# Patient Record
Sex: FEMALE | Race: WHITE
[De-identification: names, ages, dates, MRNs, and addresses within clinical notes are randomized per-mention and may not be internally consistent; named-entity substitution may affect disease eponyms.]

---

## 2023-10-17 ENCOUNTER — HOSPITAL ENCOUNTER (EMERGENCY)
Dept: HOSPITAL 97 - ER | Age: 33
Discharge: HOME | End: 2023-10-17
Payer: COMMERCIAL

## 2023-10-17 DIAGNOSIS — R10.12: Primary | ICD-10-CM

## 2023-10-17 DIAGNOSIS — Z88.2: ICD-10-CM

## 2023-10-17 LAB
ALBUMIN SERPL BCP-MCNC: 3.2 G/DL (ref 3.4–5)
ALP SERPL-CCNC: 50 U/L (ref 45–117)
ALT SERPL W P-5'-P-CCNC: 14 U/L (ref 13–56)
AST SERPL W P-5'-P-CCNC: 6 U/L (ref 15–37)
BUN BLD-MCNC: 12 MG/DL (ref 7–18)
GLUCOSE SERPLBLD-MCNC: 90 MG/DL (ref 74–106)
HCT VFR BLD CALC: 32.1 % (ref 36–45)
LIPASE SERPL-CCNC: 67 U/L (ref 13–75)
LYMPHOCYTES # SPEC AUTO: 3.7 K/UL (ref 0.7–4.9)
MCV RBC: 79.8 FL (ref 80–100)
PMV BLD: 8.8 FL (ref 7.6–11.3)
POTASSIUM SERPL-SCNC: 3.5 MEQ/L (ref 3.5–5.1)
RBC # BLD: 4.02 M/UL (ref 3.86–4.86)
WBC # BLD AUTO: 8.4 THOU/UL (ref 4.3–10.9)

## 2023-10-17 PROCEDURE — 99284 EMERGENCY DEPT VISIT MOD MDM: CPT

## 2023-10-17 PROCEDURE — 80053 COMPREHEN METABOLIC PANEL: CPT

## 2023-10-17 PROCEDURE — 96361 HYDRATE IV INFUSION ADD-ON: CPT

## 2023-10-17 PROCEDURE — 81025 URINE PREGNANCY TEST: CPT

## 2023-10-17 PROCEDURE — 96374 THER/PROPH/DIAG INJ IV PUSH: CPT

## 2023-10-17 PROCEDURE — 81001 URINALYSIS AUTO W/SCOPE: CPT

## 2023-10-17 PROCEDURE — 74177 CT ABD & PELVIS W/CONTRAST: CPT

## 2023-10-17 PROCEDURE — 83690 ASSAY OF LIPASE: CPT

## 2023-10-17 PROCEDURE — 85025 COMPLETE CBC W/AUTO DIFF WBC: CPT

## 2023-10-17 PROCEDURE — 36415 COLL VENOUS BLD VENIPUNCTURE: CPT

## 2023-10-17 PROCEDURE — 96375 TX/PRO/DX INJ NEW DRUG ADDON: CPT

## 2023-10-17 NOTE — XMS REPORT
Continuity of Care Document

                           Created on:2023



Patient:SHADE AGUILAR

Sex:Female

:1990

External Reference #:[id number]





Demographics







                          Address                    [place] [id number]

 

                          Home Phone                [phone]

 

                          Work Phone                [phone]

 

                          Mobile Phone              [phone]

 

                          Email Address             [email or web address]

[email or web address]

 

                          Preferred Language        en

 

                          Marital Status            Unknown

 

                          Christianity Affiliation     Unknown

 

                          Race                      Unknown

 

                          Additional Race(s)        White



                                                    Unavailable

 

                          Ethnic Group              Not  or 









Author







                          Organization              [facility]

t

 

                          Address                   [address] [place] [address] [address]



                                                    [place] [address]

 

                          Phone                     [phone]









Support







                Name            Relationship    Address         Phone

 

                ZEENAT AGUILAR     Spouse          Unavailable     [phone]

 

                Sathya Camilo  Father          not given by pt [phone]



                                                [place], [place]     

 

                Grace Sorto  Mother           [address] [phone]

[phone]



                                                [place] [id number] 

 

                SATHYA CAMILO  E               Unavailable     [phone]

 

                ZEENAT AGUILAR     X               NOT DISCLOSED   [phone]



                                                [place] [id number] 

 

                SHADE AGUILAR                [address] Unavailable



                                                [place] [address] 

 

                NOT, OBTAINED   OT              UNKNOWN         [phone]



                                                [place] [address] 

 

                ZEENAT AGUILAR     SP               [address] [phone]

[phone]



                                                [place] [id number] 









Care Team Providers







                    Name                Role                Phone

 

                    No, Pcp [facility]     Primary Care Physician Unavailable

 

                    CHIDI SIMON Attending Clinician Unavailable

 

                    Chidi Simon DNP Attending Clinician [phone]

 

                    Estela Spangler MD   Attending Clinician [phone]

 

                    Janay Valdes    Attending Clinician [phone]

 

                    ESTELA SPANGLER      Attending Clinician Unavailable

 

                    Res[facility]/Jonas, [facility] Attending Clinician Unavailable

 

                    Doctor Unassigned, No Name Attending Clinician Unavailable

 

                    LAZARO KENNEDY     Attending Clinician Unavailable

 

                    Lazaro Kennedy MD  Attending Clinician [phone]

 

                    ANDERW LYNCH     Attending Clinician Unavailable

 

                    Andrew Lynch MD  Attending Clinician [phone]

 

                    CYDNEY HERMOSILLO     Attending Clinician Unavailable

 

                    Visit, [facility] Nurse Attending Clinician Unavailable

 

                    Dianne Sayda ANGELES Attending Clinician [phone]

 

                    DEANA GIMENEZ    Attending Clinician Unavailable

 

                    DEANA GIMENEZ    Attending Clinician Unavailable

 

                    Deana Gimenez MD Attending Clinician [phone]

 

                    Liz Valenzuela MD  Attending Clinician [phone]

 

                    Katya Jackson RN      Attending Clinician Unavailable

 

                    SAYDA DEAN    Attending Clinician Unavailable

 

                    Risk, Dic-Rmchp High Attending Clinician Unavailable

 

                    Brandi Renteria MD Attending Clinician [phone]

 

                    Risk, [facility] Physician/High Attending Clinician UnavailCANDIS Suarez  Attending Clinician Unavailable

 

                    CANDIS MALIN  Attending Clinician Unavailable

 

                    LIZ VALENZUELA     Attending Clinician Unavailable

 

                    SANGEETA ROBERT   Attending Clinician Unavailable

 

                    [facility], Sangeeta Ayoub Attending Clinician [phone]

 

                    MATTHIAS ANGEL   Attending Clinician Unavailable

 

                    Matthias Angel MD Attending Clinician [phone]

 

                    4, [facility] Attending Clinician Unavailable

 

                    Vinicio Sims DO  Attending Clinician [phone]

 

                    JUANA CALDERON Attending Clinician Unavailable

 

                    [facility], Autumn DOMÍNGUEZ Attending Clinician [phone]

[phone]

 

                    1, [facility] Attending Clinician Unavailable

 

                    LYNNE SERRA   Attending Clinician Unavailable

 

                    Lynne Serra MD Attending Clinician [phone]

 

                    WAYNE SOSA     Attending Clinician Unavailable

 

                    Palomo ISAAC, Татьяна SHARP Attending Clinician [phone]

 

                    DAISY WHALEY     Attending Clinician Unavailable

 

                    2, [facility] Attending Clinician Unavailable

 

                    Daisy Whaley MD  Attending Clinician [phone]

 

                    OMARI CARVAJAL     Attending Clinician Unavailable

 

                    Omari Nicholas Attending Clinician [phone]

 

                    AUTUMN BOND Attending Clinician Unavailable

 

                    STACEY TENORIO    Attending Clinician Unavailable

 

                    Stacey Garvin Attending Clinician [phone]

 

                    KEVIN CHAKRABORTY      Attending Clinician Unavailable

 

                    Kevin Chakraborty MD   Attending Clinician [phone]

 

                    KAIT THOMPSON Attending Clinician Unavailable

 

                    UNKNOWN, ATTENDING  Attending Clinician Unavailable

 

                    Corry Kelly Attending Clinician [phone]

 

                    CORRY MULLEN    Attending Clinician Unavailable

 

                    RAISSA BUTLER      Attending Clinician Unavailable

 

                    ASA LOO     Attending Clinician Unavailable

 

                    MATTHIAS ANGEL   Admitting Clinician Unavailable

 

                    DEANA GIMENEZ    Admitting Clinician Unavailable

 

                    Deana Gimenez MD Admitting Clinician [phone]

 

                    CANDIS MALIN  Admitting Clinician Unavailable

 

                    LIZ VALENZUELA     Admitting Clinician Unavailable

 

                    Liz Valenzuela MD  Admitting Clinician [phone]

 

                    Matthias Angel MD Admitting Clinician [phone]

 

                    OMARI CARVAJAL     Admitting Clinician Unavailable

 

                    STACEY TENORIO    Admitting Clinician Unavailable

 

                    KEVIN CHAKRABORTY      Admitting Clinician Unavailable

 

                    KAIT THOMPSON Admitting Clinician Unavailable









Payers







           Payer Name Policy Type Policy Number Effective Date Expiration Date S

loretta

 

           [place]            [id number]  2018            



           [employer]C                       00:00:00              



           [facility]                                                    

 

           MEDICARE PART A \T\            [id number] 2015            



           B                                00:00:00              

 

           [facility]            [id number]  2022-10-01            



           STAR PLUS                        00:00:00              

 

           MEDICA[place]            [id number]  2018            



                                            00:00:00              

 

           MEDICARE A B            [id number] 2015            



                                            00:00:00              

 

           [place]ICA[place]            [id number]  2021            



                                            00:00:00              

 

           GENERIC MEDICAID            [id number]                        



           O                                                    







Problems







       Condition Condition Condition Status Onset  Resolution Last   Treating Co

mments 

Source



       Name   Details Category        Date   Date   Treatment Clinician        



                                                 Date                 

 

       S/P    S/P    Disease Active                              [facility]



                       3-12                               ity of



       section section               00:00:                             [place]



                                   00                                 Medical



                                                                      Branch

 

       37 weeks 37 weeks Disease Active                              Unive

rs



       gestation gestation               3-09                               ity 

of



       of     of                   00:00:                             [place]



       pregnancy pregnancy               00                                 Medi

neil



                                                                      Branch

 

       Fetal  Fetal  Disease Active                              Univers



       heart rate heart rate               3-09                               it

y of



       decelerati decelerati               00:00:                             [facility]

[place]



       ons    ons                  00                                 Medical



       affecting affecting                                                  Bran

ch



       management management                                                  



       of mother of mother                                                  

 

       33 weeks 33 weeks Disease Active                              Unive

rs



       gestation gestation               2-13                               ity 

of



       of     of                   00:00:                             [place]



       pregnancy pregnancy               00                                 [facility]

 

       29 weeks 29 weeks Disease Active                              Unive

rs



       gestation gestation               1-16                               ity 

of



       of     of                   00:00:                             [place]



       pregnancy pregnancy               00                                 [facility]

 

       Anxiety Anxiety Disease Active 2022                             Univers



       and    and                  1-09                               ity of



       depression depression               00:00:                             Te

[place]



                                                                    [facility]

 

       H/O    H/O    Disease Active 2022                             [facility]



       gastric gastric               1-09                               ity of



       sleeve sleeve               00:00:                             [place]



                                                                    [facility]

 

       Ketonuria Ketonuria Disease Active                              Uni

vers



                                   9-16                               ity of



                                   00:00:                             [place]



                                                                    [facility]

 

       ASCUS with ASCUS with Disease Active                       Overview

: Univers



       positive positive               8-                        Formattin ity

 of



       high risk high risk               00:00:                      g of this T

exas



       HPV    HPV                  00                          note   Medical



       cervical cervical                                           might be Bran

ch



                                                               different 



                                                               from the 



                                                               original. 



                                                               Per [facility] 



                                                               guideline 



                                                               s pt will 



                                                               need   



                                                               colposcop 



                                                               y      

 

       GBS (group GBS (group Disease Active                              U

nivers



       B      B                    8-05                               ity of



       Streptococ Streptococ               00:00:                             Te

[place]



       cus    cus                  00                                 Medical



       carrier), carrier),                                                  Bran

ch



       +RV    +RV                                                     



       culture, culture,                                                  



       currently currently                                                  



       pregnant pregnant                                                  

 

       GBS (group GBS (group Disease Active                              U

nivers



       B      B                    8-05                               ity of



       streptococ streptococ               00:00:                             Te

[place]



       cus) UTI cus) UTI               00                                 Medica

l



       complicati complicati                                                  Br

anch



       ng     ng                                                      



       pregnancy pregnancy                                                  

 

       Group B Group B Disease Active                              Univers



       Streptococ Streptococ               8-05                               it

y of



       cus    cus                  00:00:                             [place]



       carrier carrier               00                                 [facility]



       affecting affecting                                                  



       pregnancy pregnancy                                                  

 

       Obesity Obesity Disease Active 2021                             [facility]



                                   1-02                               [facility]



                                   00[facility]                             [facility]



                                   00                                 [facility]

 

       History of History of Disease Active 2016                             U

nivers



       herpes herpes               2-30                               ity of



       genitalis genitalis               00:00:                             [place]

[place]



                                                                    Medical



                                                                      Branch

 

       Cyst of Cyst of Disease Active 2016                             [facility]



       left ovary left ovary               2-30                               it

y of



                                   00:00:                             [place]



                                   00                                 [facility]

 

       High-risk High-risk Disease Active 2014                             Uni

vers



       pregnancy, pregnancy,               2-11                               it

y of



       third  third                00:00:                             [place]



       trimester trimester               00                                 [facility]

 

       Bipolar Bipolar Disease Active 2014-1                             [facility]



       disorder, disorder,               2-10                               ity 

of



       unspecifie unspecifie               00:00:                             Te

[place]



       d      d                    00                                 Medical



                                                                      Branch

 

       Generalize Generalize Disease Active 2014                      Overview

: Univers



       d anxiety d anxiety               2-05                        Formattin i

ty of



       disorder disorder               00:00:                      g of this Carlos

as



                                   00                          note   Medical



                                                               might be Branch



                                                               different 



                                                               from the 



                                                               original. 



                                                               Previousl 



                                                               y on   



                                                               Sertralin 



                                                               e,     



                                                               Trileptal 



                                                               ,      



                                                               Amantadin 



                                                               e,     



                                                               Abilify, 



                                                               self   



                                                               d/c'd  



                                                               when she 



                                                               found out 



                                                               she was 



                                                               pregnant. 



                                                               Psychiatr 



                                                               ist Dr. Bolden 



                                                               ([facility]) has 



                                                               f/u    



                                                               scheduled 

 

       Attention Attention Disease Active                       Overview: 

Univers



       deficit deficit               4-23                        Formattin ity o

f



       hyperactiv hyperactiv               00:00:                      g of this

 [place]



       ity    ity                  00                          note   Medical



       disorder disorder                                           might be Bran

ch



       (ADHD) (ADHD)                                           different 



                                                               from the 



                                                               original. 



                                                               ICD10  



                                                               Diagnosis 



                                                               Term   



                                                               Replacer 



                                                               Utility 

 

       Obsessive- Obsessive- Disease Active                              U

nivers



       compulsive compulsive               -                               it

y of



       personalit personalit               00:00:                             Te

[place]



       y disorder y disorder               00                                 Me

dical



                                                                      Branch







Allergies, Adverse Reactions, Alerts







       Allergy Allergy Status Severity Reaction(s) Onset  Inactive Treating Comm

ents 

Source



       Name   Type                        Date   Date   Clinician        

 

       SULFA  Allergy Active Med    Itching 2021                      [facility]



       (SULFONA                             0-27                        [facility]



       [facility]                               00:00:                      Medical



       ANTIBIOT                             00                          Center



       ICS)                                                           

 

       SULFASAL Allergy Active Low    Rash   2008                      [facility]



       AZINE                              0-28                        [facility]



                                          00:00:                      [facility]



                                          00                          Center

 

       SULFASAL DRUG   Active        Rash   2008                      [facility]



       AZINE  INGREDI                      0-28                        ity of



                                          00:00:                      [place]



                                          00                          Medical



                                                                      Branch

 

       Sulfasal Propensi Active        Rash   2008                      Univer

s



       azine  ty to                       0-28                        ity of



              adverse                      00:00:                      [place]



              reaction                      00                          Medical



              s                                                       Branch

 

       Sulfasal Drug   Active        Rash   2008                      [facility]



       azine  Allergy                      0-28                        [facility]



                                          00:00:                      [facility]



                                          00                          Center

 

       SULFA  Allergy Active High   Itching                       [facility]



       (SULFONA                             4-22                        [facility]



       [facility]                               00:00:                      Medical



       ANTIBIOT                             00                          Center



       ICS)                                                           

 

       Sulfa  Propensi Active        Hives                        [facility]



       (Sulfona ty to                       4-22                        ity of



       mide   adverse                      00:00:                      [place]



       Antibiot reaction                      00                          Medica

l



       ics)   s                                                       Branch

 

       SULFA  Drug   Active        Hives                        [facility]



       (SULFONA Class                       4-22                        ity of



       MIDE                               00:00:                      [place]



       ANTIBIOT                             00                          Medical



       ICS)                                                           [place]

 

       Sulfa  Drug   Active        Itching,                Info   [facility]



       (Sulfona Allergy               Hives  4-22                 taken  [facility]



       mide                               00:00:               from [facility]



       Antibiot                             00                          Center



       ics)                                                           







Social History







           Social Habit Start Date Stop Date  Quantity   Comments   Source

 

           ASSERTION  2022            Pregnancy             [facility]



                      00:00:00                                    [facility]

 

           Gender identity                                             [facility]

[facility] of



                                                                  [facility]

 

           Sexual orientation                                             Univer

sity of



                                                                  [facility]

 

           History of tobacco                       Passive smoker            Un

iversity of



           use                                                    [facility]

 

           History SDOH                                             [facility]



           Alcohol Std Drinks                                             [facility]

[facility]

 

           History SDOH                                             [facility]



           Alcohol Binge                                             [facility]

ter

 

           Exposure to 2023-04-10 2023 Not sure              [facility] 



           SARS-CoV-2 (event) 00:00:00   14:10:00                         [facility]

 

           History of Social 2023                       Univers

ity of



           function   00:00:00   00:00:00                         [facility]

 

           Tobacco use and 2022 Smokeless             Universit

y of



           exposure   00:00:00   00:00:00   tobacco non-user            [facility]

 

           Alcohol intake 2021 Lifetime              [facility]

es



                      00:00:00   00:00:00   non-drinker            [facility]

[facility]



                                            (finding)             

 

           History SDOH 2021-10-29 2021-10-29 1                     [facility]



           Alcohol Frequency 00:00:00   00:00:00                         [facility]

 

           Sex Assigned At 1990                       [facility]

kes



           Birth      00:00:00   00:00:00                         [facility]









                Smoking Status  Start Date      Stop Date       Source

 

                Never smoked tobacco                                 [facility]







Medications







       Ordered Filled Start  Stop   Current Ordering Indication Dosage Frequency

 Signature

                    Comments            Components          Source



     Medication Medication Date Date Medication? Clinician                (SIG) 

          



     Name Name                                                   

 

     traMADoL       No             50mg      50 mg,           Univer

s



     (ULTRAM)                                Oral,           ity of



     tablet 50      06:30: 05:47                          ONCE, 1           [place]

[place]



     mg        00   :00                           dose, On           [facility]



                                                  23 at           



                                                  0130,           



                                                  Routine           

 

     cefdinir       No             300mg      300 mg,           Univ

ers



     (OMNICEF)                                Oral,           ity of



     capsule 300      05:45: 05:47                          ONCE, 1           [facility]

[place]



     mg        00   :00                           dose, On           Medical



                                                  [place]            [facility]



                                                  23 at           



                                                  0045,           



                                                  ASAP<br>Re           



                                                  ason for           



                                                  Anti-Infec           



                                                  tive:           



                                                  Documented           



                                                  Infection<           



                                                  br>Documen           



                                                  nick            



                                                  Infection           



                                                  Site:           



                                                  Urine<br>D           



                                                  uration of           



                                                  Therapy: 7           



                                                  days           

 

     cefdinir      -0 3- No        [id number] 300mg      Take 1           U

nivers



     300 mg                                capsule by           ity of



     capsule      00:00: 04:59                          mouth           [place]



               00   :00                           every 12           Medical



                                                  (twelve)           Branch



                                                  hours for           



                                                  5 days.           

 

     ARIPiprazol      -0      Yes            15mg      Take 1           Univ

ers



     e 15 mg      4-20                               tablet by           ity of



     tablet      14:19:                               mouth.           [address]

 

     ARIPiprazol      -0      Yes            15mg      Take 1           Univ

ers



     e 15 mg      4-20                               tablet by           ity of



     tablet      14:19:                               mouth.           [address]

 

     ARIPiprazol      -0      Yes            15mg      Take 1           Univ

ers



     e 15 mg      4-20                               tablet by           ity of



     tablet      14:19:                               mouth.           [address]

 

     ARIPiprazol      -0      Yes            15mg      Take 1           Univ

ers



     e 15 mg      4-20                               tablet by           ity of



     tablet      14:19:                               mouth.           [address]

 

     ARIPiprazol      -0      Yes            15mg      Take 1           Univ

ers



     e 15 mg      4-20                               tablet by           ity of



     tablet      14:19:                               mouth.           [address]

 

     ARIPiprazol      -0      Yes            15mg      Take 1           Univ

ers



     e 15 mg      4-20                               tablet by           ity of



     tablet      14:19:                               mouth.           [address]

 

     ARIPiprazol      -0      Yes            15mg      Take 1           Univ

ers



     e 15 mg      4-20                               tablet by           ity of



     tablet      14:19:                               mouth.           [address]

 

     ARIPiprazol      -0      Yes            15mg      Take 1           Univ

ers



     e 15 mg      4-20                               tablet by           ity of



     tablet      14:19:                               mouth.           [address]

 

     ARIPiprazol      -0      Yes            15mg      Take 1           Univ

ers



     e 15 mg      4-20                               tablet by           ity of



     tablet      14:19:                               mouth.           [address]

 

     ARIPiprazol      -0      Yes            15mg      Take 1           Univ

ers



     e 15 mg      4-20                               tablet by           ity of



     tablet      14:19:                               mouth.           [place]



               15                                                Medical



                                                                 Branch

 

     ARIPiprazol      -0      Yes            15mg      Take 1           Univ

ers



     e 15 mg      4-20                               tablet by           ity of



     tablet      14:19:                               mouth.           [place]



               15                                                Medical



                                                                 Branch

 

     ARIPiprazol      -0      Yes            15mg      Take 1           Univ

ers



     e 15 mg      4-20                               tablet by           ity of



     tablet      14:19:                               mouth.           [place]



               15                                                Medical



                                                                 Branch

 

     ARIPiprazol      -0      Yes            15mg      Take 1           Univ

ers



     e 15 mg      4-20                               tablet by           ity of



     tablet      14:19:                               mouth.           [place]



               15                                                Medical



                                                                 Branch

 

     ARIPiprazol      -0      Yes            15mg      Take 1           Univ

ers



     e 15 mg      4-20                               tablet by           ity of



     tablet      14:19:                               mouth.           [place]



               15                                                Medical



                                                                 Branch

 

     ARIPiprazol      -0      Yes            15mg      Take 1           Univ

ers



     e 15 mg      4-20                               tablet by           ity of



     tablet      14:19:                               mouth.           [place]



               15                                                Medical



                                                                 Branch

 

     ARIPiprazol      -0      Yes            15mg      Take 1           Univ

ers



     e 15 mg      4-20                               tablet by           ity of



     tablet      14:19:                               mouth.           [place]



               15                                                Medical



                                                                 Branch

 

     ARIPiprazol      -0      Yes            15mg      Take 1           Univ

ers



     e 15 mg      4-20                               tablet by           ity of



     tablet      14:19:                               mouth.           [place]



               15                                                Medical



                                                                 Branch

 

     ARIPiprazol      -0      Yes            15mg      Take 1           Univ

ers



     e 15 mg      4-20                               tablet by           ity of



     tablet      14:19:                               mouth.           [place]



               15                                                Medical



                                                                 Branch

 

     ARIPiprazol      -0      Yes            15mg      Take 1           Univ

ers



     e 15 mg      4-20                               tablet by           ity of



     tablet      14:19:                               mouth.           [place]



               15                                                Medical



                                                                 Branch

 

     ARIPiprazol      -0      Yes            15mg      Take 1           Univ

ers



     e 15 mg      4-20                               tablet by           ity of



     tablet      14:19:                               mouth.           [place]



               15                                                Medical



                                                                 Branch

 

     ARIPiprazol      -0      Yes            15mg      Take 1           Univ

ers



     e 15 mg      4-20                               tablet by           ity of



     tablet      14:19:                               mouth.           [place]



               15                                                Medical



                                                                 Branch

 

     ARIPiprazol      -0      Yes            15mg      Take 1           Univ

ers



     e 15 mg      4-20                               tablet by           ity of



     tablet      14:19:                               mouth.           [place]



               15                                                Medical



                                                                 Branch

 

     ARIPiprazol      -0      Yes            15mg      Take 1           Univ

ers



     e 15 mg      4-20                               tablet by           ity of



     tablet      14:19:                               mouth.           [place]



               15                                                Medical



                                                                 Branch

 

     ARIPiprazol      3-0      Yes            15mg      Take 1           Univ

ers



     e 15 mg      4-20                               tablet by           ity of



     tablet      14:19:                               mouth.           [place]



               15                                                Medical



                                                                 Branch

 

     ARIPiprazol      3-0      Yes            15mg      Take 1           Univ

ers



     e 15 mg      4-20                               tablet by           ity of



     tablet      14:19:                               mouth.           [place]



               15                                                Medical



                                                                 Branch

 

     busPIRone      3-0      Yes            10mg      Take 1           Univer

s



     10 mg      3-16                               tablet by           ity of



     tablet      13:28:                               mouth.           [place]



               08                                                Medical



                                                                 Branch

 

     busPIRone      2023-0      Yes            10mg      Take 1           Univer

s



     10 mg      3-16                               tablet by           ity of



     tablet      13:28:                               mouth.           [place]



               08                                                Medical



                                                                 Branch

 

     busPIRone      2023-0      Yes            10mg      Take 1           Univer

s



     10 mg      3-16                               tablet by           ity of



     tablet      13:28:                               mouth.           [place]



               08                                                Medical



                                                                 Branch

 

     busPIRone      2023-0      Yes            10mg      Take 1           Univer

s



     10 mg      3-16                               tablet by           ity of



     tablet      13:28:                               mouth.           [place]



               08                                                Medical



                                                                 Branch

 

     busPIRone      2023-0      Yes            10mg      Take 1           Univer

s



     10 mg      3-16                               tablet by           ity of



     tablet      13:28:                               mouth.           [place]



               08                                                Medical



                                                                 Branch

 

     busPIRone      2023-0      Yes            10mg      Take 1           Univer

s



     10 mg      3-16                               tablet by           ity of



     tablet      13:28:                               mouth.           [place]



               08                                                Medical



                                                                 Branch

 

     busPIRone      2023-0      Yes            10mg      Take 1           Univer

s



     10 mg      3-16                               tablet by           ity of



     tablet      13:28:                               mouth.           [place]



               08                                                Medical



                                                                 Branch

 

     busPIRone      2023-0      Yes            10mg      Take 1           Univer

s



     10 mg      3-16                               tablet by           ity of



     tablet      13:28:                               mouth.           [place]



               08                                                Medical



                                                                 Branch

 

     busPIRone      2023-0      Yes            10mg      Take 1           Univer

s



     10 mg      3-16                               tablet by           ity of



     tablet      13:28:                               mouth.           [place]



               08                                                Medical



                                                                 Branch

 

     busPIRone      2023-0      Yes            10mg      Take 1           Univer

s



     10 mg      3-16                               tablet by           ity of



     tablet      13:28:                               mouth.           [place]



               08                                                Medical



                                                                 Branch

 

     busPIRone      2023-0      Yes            10mg      Take 1           Univer

s



     10 mg      3-16                               tablet by           ity of



     tablet      13:28:                               mouth.           [place]



               08                                                Medical



                                                                 Branch

 

     busPIRone      2023-0      Yes            10mg      Take 1           Univer

s



     10 mg      3-16                               tablet by           ity of



     tablet      13:28:                               mouth.           [place]



               08                                                Medical



                                                                 Branch

 

     busPIRone      2023-0      Yes            10mg      Take 1           Univer

s



     10 mg      3-16                               tablet by           ity of



     tablet      13:28:                               mouth.           [place]



               08                                                Medical



                                                                 Branch

 

     busPIRone      2023-0      Yes            10mg      Take 1           Univer

s



     10 mg      3-16                               tablet by           ity of



     tablet      13:28:                               mouth.           [place]



               08                                                Medical



                                                                 Branch

 

     busPIRone      2023-0      Yes            10mg      Take 1           Univer

s



     10 mg      3-16                               tablet by           ity of



     tablet      13:28:                               mouth.           [place]



               08                                                Medical



                                                                 Branch

 

     busPIRone      2023-0      Yes            10mg      Take 1           Univer

s



     10 mg      3-16                               tablet by           ity of



     tablet      13:28:                               mouth.           [place]



               08                                                Medical



                                                                 Branch

 

     busPIRone      2023-0      Yes            10mg      Take 1           Univer

s



     10 mg      3-16                               tablet by           ity of



     tablet      13:28:                               mouth.           [place]



               08                                                Medical



                                                                 Branch

 

     busPIRone      2023-0      Yes            10mg      Take 1           Univer

s



     10 mg      3-16                               tablet by           ity of



     tablet      13:28:                               mouth.           [place]



               08                                                Medical



                                                                 Branch

 

     busPIRone      2023-0      Yes            10mg      Take 1           Univer

s



     10 mg      3-16                               tablet by           ity of



     tablet      13:28:                               mouth.           [place]



               08                                                Medical



                                                                 Branch

 

     busPIRone      2023-0      Yes            10mg      Take 1           Univer

s



     10 mg      3-16                               tablet by           ity of



     tablet      13:28:                               mouth.           [place]



               08                                                Medical



                                                                 Branch

 

     busPIRone      2023-0      Yes            10mg      Take 1           Univer

s



     10 mg      3-16                               tablet by           ity of



     tablet      13:28:                               mouth.           [place]



               08                                                Medical



                                                                 Branch

 

     busPIRone      2023-0      Yes            10mg      Take 1           Univer

s



     10 mg      3-16                               tablet by           ity of



     tablet      13:28:                               mouth.           [place]



               08                                                Medical



                                                                 Branch

 

     busPIRone      2023-0      Yes            10mg      Take 1           Univer

s



     10 mg      3-16                               tablet by           ity of



     tablet      13:28:                               mouth.           [place]



               08                                                Medical



                                                                 Branch

 

     busPIRone      2023-0      Yes            10mg      Take 1           Univer

s



     10 mg      3-16                               tablet by           ity of



     tablet      13:28:                               mouth.           [place]



               08                                                Medical



                                                                 Branch

 

     busPIRone      2023-0      Yes            10mg      Take 1           [facility]

[facility]



     10 mg      3-16                               tablet by           [facility]



     tablet      13:28:                               mouth.           [address]

 

     busPIRone      -0      Yes            10mg      Take 1           [facility]

[facility]



     10 mg      3-16                               tablet by           [facility]



     tablet      13:28:                               mouth.           [address]

 

     busPIRone      -0      Yes            10mg      Take 1           [facility]

[facility]



     10 mg      3-16                               tablet by           [facility]



     tablet      13:28:                               mouth.           [address]

 

     busPIRone      -0      Yes            10mg      Take 1           [facility]

[facility]



     10 mg      3-16                               tablet by           [facility]



     tablet      13:28:                               mouth.           [address]

 

     busPIRone      -0      Yes            10mg      Take 1           [facility]

[facility]



     10 mg      3-16                               tablet by           [facility]



     tablet      13:28:                               mouth.           [address]

 

     busPIRone            Yes            10mg      Take 10 mg           [facility]

[facility]



     10 mg      3-12                               by mouth.           [facility]



     tablet      10:56:                                              [address]

 

     ARIPiprazol            Yes            15mg      Take 15 mg           

[facility]



     e 15 mg      3-12                               by mouth.           [facility]



     tablet      10:56:                                              [address]

 

     ARIPiprazol      0      Yes            15mg      Take 15 mg           

[facility]



     e 15 mg      3-12                               by mouth.           [facility]



     tablet      10:56:                                              [address]

 

     ARIPiprazol            Yes            15mg      Take 15 mg           

[facility]



     e 15 mg      3-12                               by mouth.           [facility]



     tablet      10:56:                                              [address]

 

     ARIPiprazol            Yes            15mg      Take 15 mg           

[facility]



     e 15 mg      3-12                               by mouth.           [facility]



     tablet      10:56:                                              [address]

 

     ARIPiprazol      0      Yes            15mg      Take 15 mg           

[facility]



     e 15 mg      3-12                               by mouth.           [facility]



     tablet      10:56:                                              [address]

 

     acetaminoph            Yes            650mg      650 mg,           [facility]

[facility]



     en        3-11                               Oral, Q6H,           [facility]



     (TYLENOL)      04:00:                               First dose           [facility]

[place]



     tablet 650      00                                 on Fri           [facility]



     mg                                           3/10/23 at           [facility]



                                                  2200,           



                                                  Until           



                                                  Discontinu           



                                                  ed,            



                                                  Routine           

 

     prenatal            Yes       [id number] 1{tbl}      Take 1           

[facility]



     vitamin      3-11                               tablet by           [facility]



     w/FA tablet      00:00:                               mouth in           [facility]

[place]



               00                                 the            [facility]



                                                  morning.           [place]

 

     docusate      2023-0      Yes       [id number] 200mg      Take 2           [facility]

[facility]



     100 mg      3-11                               capsules           [facility]



     capsule      00:00:                               by mouth           [place]



               00                                 once daily           [facility]



                                                  as needed           [facility]



                                                  for            



                                                  Constipati           



                                                  on.            

 

     ferrous      -0      Yes       [id number] 325mg      Take 1           [facility]

[facility]



     sulfate 325      3-11                               tablet by           [facility]

 of



     mg (65 mg      00:00:                               mouth in           [place]

s



     iron)      00                                 the            [facility]



     tablet                                         morning           [facility]



                                                  and 1           



                                                  tablet in           



                                                  the            



                                                  evening.           

 

     ibuprofen      -0      Yes       [id number] 600mg      Take 1           

[facility]



     600 mg      3-11                               tablet by           [facility]



     tablet      00:00:                               mouth           [place]



               00                                 every 6           [facility]



                                                  (six)           [facility]



                                                  hours as           



                                                  needed           



                                                  (Pain).           



                                                  Take with           



                                                  food or           



                                                  milk.           

 

     HYDROcodone      0      Yes       [id number] 1{tbl}      Take 1           [facility]

[facility]



     -acetaminop      3-11                               tablet by           [facility]

 [facility]



     hen 5-325      00:00:                               mouth           [place]



     mg tablet      00                                 every 6           [facility]



                                                  (six)           [facility]



                                                  hours as           



                                                  needed           



                                                  (Pain           



                                                  scale           



                                                  above 4)           



                                                  for up to           



                                                  10 doses.           



                                                  Do not           



                                                  exceed 3           



                                                  grams of           



                                                  acetaminop           



                                                  hen in 24           



                                                  hours.           



                                                  Indication           



                                                  s: acute           



                                                  pain           

 

     prenatal      -0      Yes       [id number] 1{tbl}      Take 1           

[facility]



     vitamin      3-11                               tablet by           [facility]



     w/FA tablet      00:00:                               mouth in           [facility]

[place]



               00                                 the            [facility]



                                                  morning.           [facility]

 

     docusate      0      Yes       [id number] 200mg      Take 2           [facility]

[facility]



     100 mg      3-11                               capsules           [facility]



     capsule      00:00:                               by mouth           [place]



               00                                 once daily           [facility]



                                                  as needed           [facility]



                                                  for            



                                                  Constipati           



                                                  on.            

 

     ferrous      -0      Yes       [id number] 325mg      Take 1           [facility]

[facility]



     sulfate 325      3-11                               tablet by           [facility]

 [facility]



     mg (65 mg      00:00:                               mouth in           [place]

s



     iron)      00                                 the            [facility]



     tablet                                         morning           [facility]



                                                  and 1           



                                                  tablet in           



                                                  the            



                                                  evening.           

 

     ibuprofen      -0      Yes       [id number] 600mg      Take 1           

[facility]



     600 mg      3-11                               tablet by           [facility]



     tablet      00:00:                               mouth           [place]



               00                                 every 6           [facility]



                                                  (six)           [facility]



                                                  hours as           



                                                  needed           



                                                  (Pain).           



                                                  Take with           



                                                  food or           



                                                  milk.           

 

     HYDROcodone      -0      Yes       [id number] 1{tbl}      Take 1           [facility]

[facility]



     -acetaminop      3-11                               tablet by           [facility]

 [facility]



     hen 5-325      00:00:                               mouth           [place]



     mg tablet      00                                 every 6           [facility]



                                                  (six)           [facility]



                                                  hours as           



                                                  needed           



                                                  (Pain           



                                                  scale           



                                                  above 4)           



                                                  for up to           



                                                  10 doses.           



                                                  Do not           



                                                  exceed 3           



                                                  grams of           



                                                  acetaminop           



                                                  hen in 24           



                                                  hours.           



                                                  Indication           



                                                  s: acute           



                                                  pain           

 

     prenatal      -0      Yes       [id number] 1{tbl}      Take 1           

[facility]



     vitamin      3-11                               tablet by           [facility]



     w/FA tablet      00:00:                               mouth in           [facility]

[place]



               00                                 the            [facility]



                                                  morning.           [facility]

 

     docusate      -0      Yes       [id number] 200mg      Take 2           [facility]

[facility]



     100 mg      3-11                               capsules           [facility]



     capsule      00:00:                               by mouth           [place]



               00                                 once daily           [facility]



                                                  as needed           [facility]



                                                  for            



                                                  Constipati           



                                                  on.            

 

     ferrous      -0      Yes       [id number] 325mg      Take 1           [facility]

[facility]



     sulfate 325      3-11                               tablet by           [facility]

 [facility]



     mg (65 mg      00:00:                               mouth in           [place]

s



     iron)      00                                 the            [facility]



     tablet                                         morning           [facility]



                                                  and 1           



                                                  tablet in           



                                                  the            



                                                  evening.           

 

     ibuprofen      0      Yes       [id number] 600mg      Take 1           

[facility]



     600 mg      3-11                               tablet by           [facility]



     tablet      00:00:                               mouth           [place]



               00                                 every 6           [facility]



                                                  (six)           [facility]



                                                  hours as           



                                                  needed           



                                                  (Pain).           



                                                  Take with           



                                                  food or           



                                                  milk.           

 

     HYDROcodone      0      Yes       4647 1{tbl}      Take 1           [facility]

[facility]



     -acetaminop      3-11                               tablet by           [facility]

 [facility]



     hen 5-325      00:00:                               mouth           [place]



     mg tablet      00                                 every 6           [facility]



                                                  (six)           [facility]



                                                  hours as           



                                                  needed           



                                                  (Pain           



                                                  scale           



                                                  above 4)           



                                                  for up to           



                                                  10 doses.           



                                                  Do not           



                                                  exceed 3           



                                                  grams of           



                                                  acetaminop           



                                                  hen in 24           



                                                  hours.           



                                                  Indication           



                                                  s: acute           



                                                  pain           

 

     prenatal      -0      Yes       [id number] 1{tbl}      Take 1           

[facility]



     vitamin      3-11                               tablet by           [facility]



     w/FA tablet      00:00:                               mouth in           [facility]

[place]



               00                                 the            [facility]



                                                  morning.           [facility]

 

     docusate      -0      Yes       [id number] 200mg      Take 2           [facility]

[facility]



     100 mg      3-11                               capsules           [facility]



     capsule      00:00:                               by mouth           [place]



               00                                 once daily           [facility]



                                                  as needed           [facility]



                                                  for            



                                                  Constipati           



                                                  on.            

 

     ferrous      -0      Yes       [id number] 325mg      Take 1           [facility]

[facility]



     sulfate 325      3-11                               tablet by           [facility]

 [facility]



     mg (65 mg      00:00:                               mouth in           [place]

s



     iron)      00                                 the            [facility]



     tablet                                         morning           [facility]



                                                  and 1           



                                                  tablet in           



                                                  the            



                                                  evening.           

 

     ibuprofen      -0      Yes       [id number] 600mg      Take 1           

[facility]



     600 mg      3-11                               tablet by           [facility]



     tablet      00:00:                               mouth           [place]



               00                                 every 6           [facility]



                                                  (six)           [facility]



                                                  hours as           



                                                  needed           



                                                  (Pain).           



                                                  Take with           



                                                  food or           



                                                  milk.           

 

     HYDROcodone      -0      Yes       4647 1{tbl}      Take 1           [facility]

[facility]



     -acetaminop      3-11                               tablet by           [facility]

 [facility]



     hen 5-325      00:00:                               mouth           [place]



     mg tablet      00                                 every 6           [facility]



                                                  (six)           [facility]



                                                  hours as           



                                                  needed           



                                                  (Pain           



                                                  scale           



                                                  above 4)           



                                                  for up to           



                                                  10 doses.           



                                                  Do not           



                                                  exceed 3           



                                                  grams of           



                                                  acetaminop           



                                                  hen in 24           



                                                  hours.           



                                                  Indication           



                                                  s: acute           



                                                  pain           

 

     prenatal      -0      Yes       [id number] 1{tbl}      Take 1           

[facility]



     vitamin      3-11                               tablet by           [facility]



     w/FA tablet      00:00:                               mouth in           [facility]

[place]



               00                                 the            [facility]



                                                  morning.           [facility]

 

     docusate      0      Yes       [id number] 200mg      Take 2           [facility]

[facility]



     100 mg      3-11                               capsules           [facility]



     capsule      00:00:                               by mouth           [place]



               00                                 once daily           [facility]



                                                  as needed           [facility]



                                                  for            



                                                  Constipati           



                                                  on.            

 

     ferrous      -0      Yes       [id number] 325mg      Take 1           [facility]

[facility]



     sulfate 325      3-11                               tablet by           [facility]

 [facility]



     mg (65 mg      00:00:                               mouth in           [place]

s



     iron)      00                                 the            [facility]



     tablet                                         morning           [facility]



                                                  and 1           



                                                  tablet in           



                                                  the            



                                                  evening.           

 

     ibuprofen      -0      Yes       [id number] 600mg      Take 1           

[facility]



     600 mg      3-11                               tablet by           [facility]



     tablet      00:00:                               mouth           [place]



               00                                 every 6           [facility]



                                                  (six)           [facility]



                                                  hours as           



                                                  needed           



                                                  (Pain).           



                                                  Take with           



                                                  food or           



                                                  milk.           

 

     HYDROcodone      -0      Yes       4647 1{tbl}      Take 1           [facility]

[facility]



     -acetaminop      3-11                               tablet by           [facility]

 [facility]



     hen 5-325      00:00:                               mouth           [place]



     mg tablet      00                                 every 6           [facility]



                                                  (six)           [facility]



                                                  hours as           



                                                  needed           



                                                  (Pain           



                                                  scale           



                                                  above 4)           



                                                  for up to           



                                                  10 doses.           



                                                  Do not           



                                                  exceed 3           



                                                  grams of           



                                                  acetaminop           



                                                  hen in 24           



                                                  hours.           



                                                  Indication           



                                                  s: acute           



                                                  pain           

 

     ferrous      -0      Yes       [id number] 325mg      Take 1           [facility]

[facility]



     sulfate 325      3-11                               tablet by           [facility]

 of



     mg (65 mg      00:00:                               mouth in           [facility]

s



     iron)      00                                 the            Medical



     tablet                                         morning           [facility]



                                                  and 1           



                                                  tablet in           



                                                  the            



                                                  evening.           

 

     ferrous      -0      Yes       [id number] 325mg      Take 1           [facility]

[facility]



     sulfate 325      3-11                               tablet by           [facility]

 of



     mg (65 mg      00:00:                               mouth in           [facility]

s



     iron)      00                                 the            Medical



     tablet                                         morning           [facility]



                                                  and 1           



                                                  tablet in           



                                                  the            



                                                  evening.           

 

     ferrous      -0      Yes       [id number] 325mg      Take 1           [facility]

[facility]



     sulfate 325      3-11                               tablet by           [facility]

 of



     mg (65 mg      00:00:                               mouth in           [facility]

s



     iron)      00                                 the            [facility]



     tablet                                         morning           [facility]



                                                  and 1           



                                                  tablet in           



                                                  the            



                                                  evening.           

 

     ferrous      -0      Yes       [id number] 325mg      Take 1           [facility]

[facility]



     sulfate 325      3-11                               tablet by           [facility]

 of



     mg (65 mg      00:00:                               mouth in           [facility]

[facility]



     iron)      00                                 the            Medical



     tablet                                         morning           [facility]



                                                  and 1           



                                                  tablet in           



                                                  the            



                                                  evening.           

 

     ferrous      -0      Yes       [id number] 325mg      Take 1           [facility]

[facility]



     sulfate 325      3-11                               tablet by           [facility]

 of



     mg (65 mg      00:00:                               mouth in           [facility]

[facility]



     iron)      00                                 the            Medical



     tablet                                         morning           [facility]



                                                  and 1           



                                                  tablet in           



                                                  the            



                                                  evening.           

 

     ferrous      -0      Yes       [id number] 325mg      Take 1           [facility]

[facility]



     sulfate 325      3-11                               tablet by           [facility]

 of



     mg (65 mg      00:00:                               mouth in           [facility]

[facility]



     iron)      00                                 the            Medical



     tablet                                         morning           [facility]



                                                  and 1           



                                                  tablet in           



                                                  the            



                                                  evening.           

 

     ferrous      -0      Yes       [id number] 325mg      Take 1           [facility]

[facility]



     sulfate 325      3-11                               tablet by           [facility]

 of



     mg (65 mg      00:00:                               mouth in           [facility]

[facility]



     iron)      00                                 the            Medical



     tablet                                         morning           [facility]



                                                  and 1           



                                                  tablet in           



                                                  the            



                                                  evening.           

 

     ferrous      -0      Yes       [id number] 325mg      Take 1           [facility]

[facility]



     sulfate 325      3-11                               tablet by           [facility]

 of



     mg (65 mg      00:00:                               mouth in           [facility]

[facility]



     iron)      00                                 the            Medical



     tablet                                         morning           [facility]



                                                  and 1           



                                                  tablet in           



                                                  the            



                                                  evening.           

 

     ferrous      -0      Yes       [id number] 325mg      Take 1           [facility]

[facility]



     sulfate 325      3-11                               tablet by           [facility]

 of



     mg (65 mg      00:00:                               mouth in           [facility]

[facility]



     iron)      00                                 the            Medical



     tablet                                         morning           [facility]



                                                  and 1           



                                                  tablet in           



                                                  the            



                                                  evening.           

 

     ferrous      -0      Yes       [id number] 325mg      Take 1           [facility]

[facility]



     sulfate 325      3-11                               tablet by           [facility]

 of



     mg (65 mg      00:00:                               mouth in           [facility]

[facility]



     iron)      00                                 the            Medical



     tablet                                         morning           [facility]



                                                  and 1           



                                                  tablet in           



                                                  the            



                                                  evening.           

 

     ferrous      -0      Yes       [id number] 325mg      Take 1           [facility]

[facility]



     sulfate 325      3-11                               tablet by           [facility]

 of



     mg (65 mg      00:00:                               mouth in           [facility]

[facility]



     iron)      00                                 the            Medical



     tablet                                         morning           [facility]



                                                  and 1           



                                                  tablet in           



                                                  the            



                                                  evening.           

 

     ferrous      3-0      Yes       [id number] 325mg      Take 1           [facility]

[facility]



     sulfate 325      3-11                               tablet by           [facility]

 of



     mg (65 mg      00:00:                               mouth in           [facility]

[facility]



     iron)      00                                 the            Medical



     tablet                                         morning           [facility]



                                                  and 1           



                                                  tablet in           



                                                  the            



                                                  evening.           

 

     ferrous      -0      Yes       [id number] 325mg      Take 1           [facility]

[facility]



     sulfate 325      3-11                               tablet by           [facility]

 of



     mg (65 mg      00:00:                               mouth in           [facility]

[facility]



     iron)      00                                 the            Medical



     tablet                                         morning           [facility]



                                                  and 1           



                                                  tablet in           



                                                  the            



                                                  evening.           

 

     ferrous      -0      Yes       [id number] 325mg      Take 1           [facility]

[facility]



     sulfate 325      3-11                               tablet by           [facility]

 of



     mg (65 mg      00:00:                               mouth in           [facility]

[facility]



     iron)      00                                 the            Medical



     tablet                                         morning           [facility]



                                                  and 1           



                                                  tablet in           



                                                  the            



                                                  evening.           

 

     ferrous      -0      Yes       [id number] 325mg      Take 1           [facility]

[facility]



     sulfate 325      3-11                               tablet by           [facility]

 of



     mg (65 mg      00:00:                               mouth in           [facility]

[facility]



     iron)      00                                 the            Medical



     tablet                                         morning           [facility]



                                                  and 1           



                                                  tablet in           



                                                  the            



                                                  evening.           

 

     ferrous      -0      Yes       [id number] 325mg      Take 1           [facility]

[facility]



     sulfate 325      3-11                               tablet by           [facility]

 of



     mg (65 mg      00:00:                               mouth in           [facility]

[facility]



     iron)      00                                 the            Medical



     tablet                                         morning           [facility]



                                                  and 1           



                                                  tablet in           



                                                  the            



                                                  evening.           

 

     ferrous      -0      Yes       [id number] 325mg      Take 1           [facility]

[facility]



     sulfate 325      3-11                               tablet by           [facility]

 of



     mg (65 mg      00:00:                               mouth in           [facility]

[facility]



     iron)      00                                 the            Medical



     tablet                                         morning           [facility]



                                                  and 1           



                                                  tablet in           



                                                  the            



                                                  evening.           

 

     ferrous      -0      Yes       [id number] 325mg      Take 1           [facility]

[facility]



     sulfate 325      3-11                               tablet by           [facility]

 of



     mg (65 mg      00:00:                               mouth in           [facility]

[facility]



     iron)      00                                 the            Medical



     tablet                                         morning           [facility]



                                                  and 1           



                                                  tablet in           



                                                  the            



                                                  evening.           

 

     ferrous      -0      Yes       [id number] 325mg      Take 1           [facility]

[facility]



     sulfate 325      3-11                               tablet by           [facility]

 of



     mg (65 mg      00:00:                               mouth in           [facility]

[facility]



     iron)      00                                 the            Medical



     tablet                                         morning           [facility]



                                                  and 1           



                                                  tablet in           



                                                  the            



                                                  evening.           

 

     ferrous      -0      Yes       [id number] 325mg      Take 1           [facility]

[facility]



     sulfate 325      3-11                               tablet by           [facility]

 of



     mg (65 mg      00:00:                               mouth in           [facility]

[facility]



     iron)      00                                 the            Medical



     tablet                                         morning           [facility]



                                                  and 1           



                                                  tablet in           



                                                  the            



                                                  evening.           

 

     ferrous      -0      Yes       [id number] 325mg      Take 1           [facility]

[facility]



     sulfate 325      3-11                               tablet by           [facility]

 of



     mg (65 mg      00:00:                               mouth in           [facility]

[facility]



     iron)      00                                 the            Medical



     tablet                                         morning           [facility]



                                                  and 1           



                                                  tablet in           



                                                  the            



                                                  evening.           

 

     ferrous            Yes       [id number] 325mg      Take 1           [facility]

[facility]



     sulfate 325      3-11                               tablet by           [facility]

 of



     mg (65 mg      00:00:                               mouth in           [facility]

[facility]



     iron)      00                                 the            Medical



     tablet                                         morning           [facility]



                                                  and 1           



                                                  tablet in           



                                                  the            



                                                  evening.           

 

     ferrous            Yes       [id number] 325mg      Take 1           [facility]

[facility]



     sulfate 325      3-11                               tablet by           [facility]

 of



     mg (65 mg      00:00:                               mouth in           [facility]

[facility]



     iron)      00                                 the            Medical



     tablet                                         morning           [facility]



                                                  and 1           



                                                  tablet in           



                                                  the            



                                                  evening.           

 

     ferrous            Yes       [id number] 325mg      Take 1           [facility]

[facility]



     sulfate 325      3-11                               tablet by           [facility]

 of



     mg (65 mg      00:00:                               mouth in           [facility]

[facility]



     iron)      00                                 the            Medical



     tablet                                         morning           [facility]



                                                  and 1           



                                                  tablet in           



                                                  the            



                                                  evening.           

 

     ferrous            Yes       [id number] 325mg      Take 1           [facility]

[facility]



     sulfate 325      3-11                               tablet by           [facility]

 of



     mg (65 mg      00:00:                               mouth in           [facility]

[facility]



     iron)      00                                 the            Medical



     tablet                                         morning           [facility]



                                                  and 1           



                                                  tablet in           



                                                  the            



                                                  evening.           

 

     prenatal      2023- No        [id number] 1{tbl}      Take 1          

 [facility]



     vitamin      3--21                          tablet by           [facility]



     w/FA tablet      00:00: 00:00                          mouth in           [facility]

[facility]



               00   :00                           the            [facility]



                                                  morning.           [facility]

 

     docusate      2023- No        [id number] 200mg      Take 2           

[facility]



     100 mg      3--21                          capsules           [facility]



     capsule      00:00: 00:00                          by mouth           [place]



               00   :00                           once daily           [facility]



                                                  as needed           [facility]



                                                  for            



                                                  Constipati           



                                                  on.            

 

     ibuprofen      2023- No        [id number] 600mg      Take 1          

 [facility]



     600 mg      3--21                          tablet by           [facility]



     tablet      00:00: 00:00                          mouth           [place]



               00   :00                           every 6           [facility]



                                                  (six)           [facility]



                                                  hours as           



                                                  needed           



                                                  (Pain).           



                                                  Take with           



                                                  food or           



                                                  milk.           

 

     HYDROcodone      2023- No        [id number] 1{tbl}      Take 1           [facility]

[facility]



     -acetaminop      3--21                          tablet by           [facility]

[facility]



     hen 5-325      00:00: 00:00                          mouth           [place]



     mg tablet      00   :00                           every 6           [facility]



                                                  (six)           [facility]



                                                  hours as           



                                                  needed           



                                                  (Pain           



                                                  scale           



                                                  above 4)           



                                                  for up to           



                                                  10 doses.           



                                                  Do not           



                                                  exceed 3           



                                                  grams of           



                                                  acetaminop           



                                                  hen in 24           



                                                  hours.           



                                                  Indication           



                                                  s: acute           



                                                  pain           

 

     prenatal      2023- No        [id number] 1{tbl}      Take 1          

 [facility]



     vitamin      3-11 04-21                          tablet by           [facility]



     w/FA tablet      00:00: 00:00                          mouth in           [facility]

[facility]



               00   :00                           the            [facility]



                                                  morning.           [facility]

 

     docusate      2023- No        [id number] 200mg      Take 2           

[facility]



     100 mg      3-11 04-21                          capsules           [facility]



     capsule      00:00: 00:00                          by mouth           [place]



               00   :00                           once daily           [facility]



                                                  as needed           [facility]



                                                  for            



                                                  Constipati           



                                                  on.            

 

     ibuprofen      2023- No        [id number] 600mg      Take 1          

 [facility]



     600 mg      3-11 04-21                          tablet by           [facility]



     tablet      00:00: 00:00                          mouth           [place]



               00   :00                           every 6           [facility]



                                                  (six)           [facility]



                                                  hours as           



                                                  needed           



                                                  (Pain).           



                                                  Take with           



                                                  food or           



                                                  milk.           

 

     HYDROcodone      2023- No        [id number] 1{tbl}      Take 1           [facility]

[facility]



     -acetaminop      3-11 04-21                          tablet by           [facility]

y of



     hen 5-325      00:00: 00:00                          mouth           [place]



     mg tablet      00   :00                           every 6           [facility]



                                                  (six)           [facility]



                                                  hours as           



                                                  needed           



                                                  (Pain           



                                                  scale           



                                                  above 4)           



                                                  for up to           



                                                  10 doses.           



                                                  Do not           



                                                  exceed 3           



                                                  grams of           



                                                  acetaminop           



                                                  hen in 24           



                                                  hours.           



                                                  Indication           



                                                  s: acute           



                                                  pain           

 

     busPIRone            Yes            10mg      10 mg,           Un[facility]

s



     (BUSPAR)      3-10                               Oral,           ity of



     tablet 10      15:00:                               DAILY,           [place]



     mg        00                                 First dose           Medical



                                                  on Fri           Branch



                                                  3/10/23 at           



                                                  0900,           



                                                  Until           



                                                  Discontinu           



                                                  ed,            



                                                  Routine           

 

     SERTraline            Yes            200mg      200 mg,           Uni

vers



     (ZOLOFT)      3-10                               Oral,           ity of



     tablet 200      15:00:                               DAILY,           [place]



     mg        00                                 First dose           Medical



                                                  on Fri           Branch



                                                  3/10/23 at           



                                                  0900,           



                                                  Until           



                                                  Discontinu           



                                                  ed,            



                                                  Routine           

 

     ARIPiprazol      0      Yes            15mg      15 mg,           Univ

ers



     e (ABILIFY)      3-10                               Oral,           ity of



     tablet 15      15:00:                               DAILY,           [place]



     mg        00                                 First dose           Medical



                                                  on Fri           Branch



                                                  3/10/23 at           



                                                  0900,           



                                                  Until           



                                                  Discontinu           



                                                  ed,            



                                                  Routine           

 

     busPIRone      2023-0      Yes            10mg      10 mg,           Univer

s



     (BUSPAR)      3-10                               Oral,           ity of



     tablet 10      15:00:                               DAILY,           [place]



     mg        00                                 First dose           Medical



                                                  on Fri           Branch



                                                  3/10/23 at           



                                                  0900,           



                                                  Until           



                                                  Discontinu           



                                                  ed,            



                                                  Routine           

 

     SERTraline            Yes            200mg      200 mg,           Uni

vers



     (ZOLOFT)      3-10                               Oral,           ity of



     tablet 200      15:00:                               DAILY,           [place]



     mg        00                                 First dose           Medical



                                                  on Fri           Branch



                                                  3/10/23 at           



                                                  0900,           



                                                  Until           



                                                  Discontinu           



                                                  ed,            



                                                  Routine           

 

     ARIPiprazol            Yes            15mg      15 mg,           Univ

ers



     e (ABILIFY)      3-10                               Oral,           ity of



     tablet 15      15:00:                               DAILY,           [place]



     mg        00                                 First dose           Medical



                                                  on Fri           Branch



                                                  3/10/23 at           



                                                  0900,           



                                                  Until           



                                                  Discontinu           



                                                  ed,            



                                                  Routine           

 

     acetaminoph       No             1000mg      1,000 mg,         

  [facility]



     en ADULT      3-10 03-10                          IV             ity of



     (OFIRMEV)      03:15: 04:00                          Infusion,           [place]

[place]



     injection      00   :00                           at 400           Medical



     1,000 mg                                         mL/hr           Branch



                                                  Administer           



                                                  over 15           



                                                  Minutes,           



                                                  ONCE, 1           



                                                  dose, On           



                                                  Thu 3/9/23           



                                                  at 2115,           



                                                  Routine<br           



                                                  >Indicatio           



                                                  n:             



                                                  Perioperat           



                                                  vilma            



                                                  Patient           

 

     rho(D)            Yes            300ug      300 mcg,           Univer

s



     immune      3-10                               Intramuscu           ity of



     globulin      03:06:                               lar, ONCE,           [facility]

as



     (RHOGAM)      03                                 For 1           Medical



     syringe 300                                         dose,           Branch



     mcg                                          Conditiona           



                                                  l, Routine           

 

     rho(D)            Yes            300ug      300 mcg,           Univer

s



     immune      3-10                               Intramuscu           ity of



     globulin      03:06:                               lar, ONCE,           [facility]

as



     (RHOGAM)      03                                 For 1           Medical



     syringe 300                                         dose,           Branch



     mcg                                          Conditiona           



                                                  l, Routine           

 

     HYDROcodone            Yes            2{tbl}      2 tablet,          

 [facility]



     -acetaminop      3-10                               Oral,           ity of



     hen (NORCO      03:05:                               Q6HPRN,           [place]

[place]



     5) 5-325 mg      58                                 Starting           Medi

neil



     tablet 2                                         on Thu           Branch



     tablet                                         3/9/23 at           



                                                  2105,           



                                                  Until           



                                                  Discontinu           



                                                  ed,            



                                                  Routine,           



                                                  Pain           



                                                  (scale           



                                                  7-10),           



                                                  Alternate           



                                                  with           



                                                  Ibuprofen           

 

     HYDROcodone            Yes            1{tbl}      1 tablet,          

 [facility]



     -acetaminop      3-10                               Oral,           ity of



     hen (NORCO      03:05:                               Q6HPRN,           [place]

[place]



     5) 5-325 mg      58                                 Starting           Medi

neil



     tablet 1                                         on [facility]



     tablet                                         3/9/23 at           



                                                  ,           



                                                  Until           



                                                  Discontinu           



                                                  ed,            



                                                  Routine,           



                                                  Pain           



                                                  (scale           



                                                  4-6),           



                                                  Alternate           



                                                  with           



                                                  Ibuprofen           

 

     ibuprofen      2023-0      Yes            600mg      600 mg,           Univ

ers



     (IBU)      3-10                               Oral,           ity of



     tablet 600      03:05:                               Q6HPRN,           [place]

[place]



     mg        58                                 Starting           Medical



                                                  on Thu           Branch



                                                  3/9/23 at           



                                                  ,           



                                                  Until           



                                                  Discontinu           



                                                  ed,            



                                                  Routine,           



                                                  Pain           



                                                  (scale           



                                                  1-3)           

 

     diphenhydrA      2023-0      Yes            25mg      25 mg,           Univ

ers



     MINE      3-10                               Slow IV           ity of



     (BENADRYL)      03:05:                               Push,           [place]



     injection      58                                 Q6HPRN,           Medical



     25 mg                                         Starting           Branch



                                                  on Thu           



                                                  3/9/23 at           



                                                  ,           



                                                  Until           



                                                  Discontinu           



                                                  ed,            



                                                  Routine,           



                                                  Itching           

 

     diphenhydrA      2023-0      Yes            25mg      25 mg,           Univ

ers



     MINE      3-10                               Oral,           ity of



     (BENADRYL)      03:05:                               Q6HPRN,           [place]

[place]



     tablet 25      58                                 Starting           Medica

l



     mg                                           on Thu           Branch



                                                  3/9/23 at           



                                                  ,           



                                                  Until           



                                                  Discontinu           



                                                  ed,            



                                                  Routine,           



                                                  Sleep,           



                                                  Itching           

 

     ondansetron      2023-0      Yes            4mg       4 mg, Slow           

[facility]



     (ZOFRAN      3-10                               IV Push,           ity of



     (PF))      03:05:                               Q8HPRN,           [place]



     injection 4      58                                 Starting           Medi

neil



     mg                                           on Thu           Branch



                                                  3/9/23 at           



                                                  ,           



                                                  Until           



                                                  Discontinu           



                                                  ed,            



                                                  Routine,           



                                                  Nausea and           



                                                  Vomiting           



                                                  (N/V)           

 

     bisacodyL      3-0      Yes            10mg      10 mg,           Univer

s



     (DULCOLAX)      3-10                               Rectal,           ity of



     suppository      03:05:                               QDAILYPRN,           

[place]



     10 mg      58                                 Starting           Medical



                                                  on [facility]



                                                  3/9/23 at           



                                                  ,           



                                                  Until           



                                                  Discontinu           



                                                  ed,            



                                                  Routine,           



                                                  Constipati           



                                                  on             

 

     simethicone      2023-0      Yes            160mg      160 mg,           Un

e[facility]ebia



     (GAS RELIEF      3-10                               Oral,           ity of



     (SIMETHICON      03:05:                               PC+HSPRN,           [facility]

[place]



     E))       58                                 Starting           Medical



     chewable                                         on Thu           Branch



     tablet 160                                         3/9/23 at           



     mg                                           ,           



                                                  Until           



                                                  Discontinu           



                                                  ed,            



                                                  Routine,           



                                                  Gas            

 

     docusate      -0      Yes            200mg      200 mg,           Unive

rs



     (COLACE)      3-10                               Oral,           ity of



     capsule 200      03:05:                               QDAILYPRN,           

[place]



     mg        58                                 Starting           Medical



                                                  on u           Branch



                                                  3/9/23 at           



                                                  ,           



                                                  Until           



                                                  Discontinu           



                                                  ed,            



                                                  Routine,           



                                                  Constipati           



                                                  on             

 

     magnesium      -0      Yes            30mL      30 mL,           Univer

s



     hydroxide      3-10                               Oral,           ity of



     (MILK OF      03:05:                               QDAILYPRN,           [facility]

as



     MAGNESIA)      58                                 Starting           Medica

l



     400 mg/5 mL                                         on u           Branch



     suspension                                         3/9/23 at           



     30 mL                                         ,           



                                                  Until           



                                                  Discontinu           



                                                  ed,            



                                                  Routine,           



                                                  Constipati           



                                                  on             

 

     lactated      -0      Yes            1000mL      at 125           Unive

rs



     ringers IV      3-10                               mL/hr,           ity of



     infusion      03:05:                               1,000 mL,           [place]

[place]



     1,000 mL      58                                 IV             Medical



                                                  Infusion,           Branch



                                                  PRN, 1           



                                                  dose,           



                                                  Starting           



                                                  on u           



                                                  3/9/23 at           



                                                  ,           



                                                  Until           



                                                  Discontinu           



                                                  ed,            



                                                  Routine           

 

     HYDROcodone      -0      Yes            1{tbl}      1 tablet,          

 [facility]



     -acetaminop      3-10                               Oral,           ity of



     hen (NORCO      03:05:                               Q6HPRN,           [place]

[place]



     5) 5-325 mg      58                                 Starting           Medi

neil



     tablet 1                                         on Thu           Branch



     tablet                                         3/9/23 at           



                                                  ,           



                                                  Until           



                                                  Discontinu           



                                                  ed,            



                                                  Routine,           



                                                  Pain           



                                                  (scale           



                                                  4-6),           



                                                  Alternate           



                                                  with           



                                                  Ibuprofen           

 

     ibuprofen      -0      Yes            600mg      600 mg,           Univ

ers



     (IBU)      3-10                               Oral,           ity of



     tablet 600      03:05:                               Q6HPRN,           [place]

[place]



     mg        58                                 Starting           Medical



                                                  on u           Branch



                                                  3/9/23 at           



                                                  ,           



                                                  Until           



                                                  Discontinu           



                                                  ed,            



                                                  Routine,           



                                                  Pain           



                                                  (scale           



                                                  1-3)           

 

     diphenhydrA      2023-0      Yes            25mg      25 mg,           Univ

ers



     MINE      3-10                               Slow IV           ity of



     (BENADRYL)      03:05:                               Push,           [place]



     injection      58                                 Q6HPRN,           Medical



     25 mg                                         Starting           Branch



                                                  on u           



                                                  3/9/23 at           



                                                  5,           



                                                  Until           



                                                  Discontinu           



                                                  ed,            



                                                  Routine,           



                                                  Itching           

 

     diphenhydrA      2023-0      Yes            25mg      25 mg,           Univ

ers



     MINE      3-10                               Oral,           ity of



     (BENADRYL)      03:05:                               Q6HPRN,           [place]

[place]



     tablet 25      58                                 Starting           Medica

l



     mg                                           on u           Branch



                                                  3/9/23 at           



                                                  210,           



                                                  Until           



                                                  Discontinu           



                                                  ed,            



                                                  Routine,           



                                                  Sleep,           



                                                  Itching           

 

     ondansetron      -0      Yes            4mg       4 mg, Slow           

[facility]



     (ZOFRAN      3-10                               IV Push,           ity of



     (PF))      03:05:                               Q8HPRN,           [place]



     injection 4      58                                 Starting           Medi

neil



     mg                                           on u           Branch



                                                  3/9/23 at           



                                                  210,           



                                                  Until           



                                                  Discontinu           



                                                  ed,            



                                                  Routine,           



                                                  Nausea and           



                                                  Vomiting           



                                                  (N/V)           

 

     bisacodyL      -0      Yes            10mg      10 mg,           Univer

s



     (DULCOLAX)      3-10                               Rectal,           ity of



     suppository      03:05:                               QDAILYPRN,           

[place]



     10 mg      58                                 Starting           Medical



                                                  on u           Branch



                                                  3/9/23 at           



                                                  210,           



                                                  Until           



                                                  Discontinu           



                                                  ed,            



                                                  Routine,           



                                                  Constipati           



                                                  on             

 

     simethicone      2023-0      Yes            160mg      160 mg,           Un

[facility]



     (GAS RELIEF      3-10                               Oral,           ity of



     (SIMETHICON      03:05:                               PC+HSPRN,           T

[facility]



     E))       58                                 Starting           Medical



     chewable                                         on [facility]



     tablet 160                                         3/9/23 at           



     mg                                           ,           



                                                  Until           



                                                  Discontinu           



                                                  ed,            



                                                  Routine,           



                                                  Gas            

 

     docusate      -0      Yes            200mg      200 mg,           Unive

rs



     (COLACE)      3-10                               Oral,           ity of



     capsule 200      03:05:                               QDAILYPRN,           

[place]



     mg        58                                 Starting           Medical



                                                  on u           Branch



                                                  3/9/23 at           



                                                  2105,           



                                                  Until           



                                                  Discontinu           



                                                  ed,            



                                                  Routine,           



                                                  Constipati           



                                                  on             

 

     magnesium      -0      Yes            30mL      30 mL,           Univer

s



     hydroxide      3-10                               Oral,           ity of



     (MILK OF      03:05:                               QDAILYPRN,           [facility]

as



     MAGNESIA)      58                                 Starting           Medica

l



     400 mg/5 mL                                         on u           Branch



     suspension                                         3/9/23 at           



     30 mL                                         ,           



                                                  Until           



                                                  Discontinu           



                                                  ed,            



                                                  Routine,           



                                                  Constipati           



                                                  on             

 

     lactated      -0      Yes            1000mL      at 125           Unive

rs



     ringers IV      3-10                               mL/hr,           ity of



     infusion      03:05:                               1,000 mL,           [place]

[place]



     1,000 mL      58                                 IV             Medical



                                                  Infusion,           Branch



                                                  PRN, 1           



                                                  dose,           



                                                  Starting           



                                                  on u           



                                                  3/9/23 at           



                                                  2105,           



                                                  Until           



                                                  Discontinu           



                                                  ed,            



                                                  Routine           

 

     HYDROcodone      2023-0 2023- No             1{tbl}      1 tablet,         

  [facility]



     -acetaminop      3-10 03-10                          Oral,           ity of



     hen (NORCO      03:01: 14:51                          Q6HPRN, 1           T

[facility]



     5) 5-325 mg      08   :00                           dose,           Medical



     tablet 1                                         Starting           Branch



     tablet                                         on Thu           



                                                  3/9/23 at           



                                                  2101,           



                                                  Until           



                                                  Discontinu           



                                                  ed,            



                                                  Routine,           



                                                  Pain           



                                                  (scale           



                                                  4-6)           

 

     HYDROcodone      -0      Yes            1{tbl}      1 tablet,          

 [facility]



     -acetaminop      3-10                               Oral,           ity of



     hen (NORCO)      03:01:                               Q6HPRN, 1           T

[facility]



      mg      05                                 dose,           Medical



     tablet 1                                         Starting           Branch



     tablet                                         on Thu           



                                                  3/9/23 at           



                                                  2101,           



                                                  Until           



                                                  Discontinu           



                                                  ed,            



                                                  Routine,           



                                                  Pain           



                                                  (scale           



                                                  7-10)           

 

     oxytocin      -0      Yes            600mL/h      600 mL/hr,           

[facility]



     (PITOCIN)      3-10                               IV             ity of



     30 units in      03:00:                               Infusion,           T

[facility]



      mL      48                                 PRN, For           Medica

l



     IV infusion                                         post           Branch



                                                  delivery           



                                                  uterine           



                                                  atony.,           



                                                  Starting           



                                                  on u           



                                                  3/9/23 at           



                                                  2100<br&gt           



                                                  ;Start at           



                                                  600 mL/hr           



                                                  for 1 hr           



                                                  then 150           



                                                  mL/hr for           



                                                  1 hr.<br>           

 

     oxytocin      3-0      Yes            300mL/h      300 mL/hr,           

[facility]



     (PITOCIN)      3-10                               IV             ity of



     30 units in      03:00:                               Infusion,           T

[facility]



      mL      48                                 SEE-INSTRU           Medi

neil



     IV infusion                                         CTIONS,           Branc

h



                                                  Starting           



                                                  on u           



                                                  3/9/23 at           



                                                  2100<br>St           



                                                  art at 300           



                                                  mL/hr for           



                                                  1 hr then           



                                                  150 mL/hr           



                                                  for 1           



                                                  hr.&nbsp;&           



                                                  nbsp; For           



                                                  post           



                                                  delivery           



                                                  uterotonic           



                                                  <br>           

 

     oxytocin      2023-0      Yes            600mL/h      600 mL/hr,           

[facility]



     (PITOCIN)      3-10                               IV             ity of



     30 units in      03:00:                               Infusion,           T

[facility]



      mL      48                                 PRN, For           Medica

l



     IV infusion                                         post           Branch



                                                  delivery           



                                                  uterine           



                                                  atony.,           



                                                  Starting           



                                                  on u           



                                                  3/9/23 at           



                                                  2100<br&gt           



                                                  ;Start at           



                                                  600 mL/hr           



                                                  for 1 hr           



                                                  then 150           



                                                  mL/hr for           



                                                  1 hr.<br>           

 

     oxytocin      2023-0      Yes            300mL/h      300 mL/hr,           

[facility]



     (PITOCIN)      3-10                               IV             ity of



     30 units in      03:00:                               Infusion,           T

[facility]



      mL      48                                 SEE-INSTRU           Medi

neil



     IV infusion                                         CTIONS,           Branc

h



                                                  Starting           



                                                  on Thu           



                                                  3/9/23 at           



                                                  2100<br>St           



                                                  art at 300           



                                                  mL/hr for           



                                                  1 hr then           



                                                  150 mL/hr           



                                                  for 1           



                                                  hr.&nbsp;&           



                                                  nbsp; For           



                                                  post           



                                                  delivery           



                                                  uterotonic           



                                                  <br>           

 

     naloxone      2023- No             .4mg      0.4 mg,           Unive

rs



     (NARCAN)      3-10 03-12                          Slow IV           ity of



     injection      02:27: 00:14                          Push, PRN           [facility]

[place]



     0.4 mg      16   :19                           - SEE           Medical



                                                  INSTRUCTIO           Branch



                                                  NS,            



                                                  Starting           



                                                  on Thu           



                                                  3/9/23 at           



                                                  ,           



                                                  Until Sat           



                                                  3/11/23 at           



                                                  1814,           



                                                  Routine,           



                                                  Analgesia           



                                                  Recovery           

 

     diphenhydrA      2023- No             25mg      25 mg,           Uni

vers



     MINE      3-10 03-11                          Slow IV           ity of



     (BENADRYL)      02:27: 00:55                          Push,           [place]



     injection      16   :37                           Q4HPRN,           Medical



     25 mg                                         Starting           Branch



                                                  on Thu           



                                                  3/9/23 at           



                                                  ,           



                                                  Until Fri           



                                                  3/10/23 at           



                                                  1855,           



                                                  Routine,           



                                                  Itching           

 

     diphenhydrA       No             25mg      25 mg,           Uni

vers



     MINE      3-10 03-11                          Slow IV           ity of



     (BENADRYL)      02:27: 00:55                          Push,           [place]



     injection      16   :37                           Q4HPRN,           Medical



     25 mg                                         Starting           Branch



                                                  on u           



                                                  3/9/23 at           



                                                  ,           



                                                  Until Fri           



                                                  3/10/23 at           



                                                  1855,           



                                                  Routine,           



                                                  Itching           

 

     FENTanyl PF       No             50ug      50 mcg,           Un

[facility]



     (SUBLIMAZE      3-10 03-10                          Slow IV           ity o

f



     (PF))      01:30: 00:39                          Push,           [place]



     injection      00   :00                           ONCE, 1           Medical



     50 mcg                                         dose, On           Branch



                                                  u 3/9/23           



                                                  at 1930,           



                                                  Routine           

 

     sodium      2023- No             30mL      30 mL,           [facility]



     citrate-cit      3-09 03-10                          Oral,           ity of



     anu acid      23:32: 01:19                          PRE-PROCED           [facility]

[facility]



     (BICITRA)      45   :00                           URE ONCE,           Medic

al



     500-334                                         1 dose,           Branch



     mg/5 mL                                         Starting           



     solution 30                                         on Thu           



     mL                                           3/9/23 at           



                                                  1732,           



                                                  Until Thu           



                                                  3/9/23 at           



                                                  1919,           



                                                  Routine,           



                                                  Surgery           

 

     ceFAZolin      2023- No             2000mg      2,000 mg,           

[facility]



     (ANCEF)      3-09 03-10                          IV             ity of



     2,000 mg in      23:32: 23:31                          Piggyback,          

 [place]



     NaCl 0.9%      15   :15                           O.R.           Medical



     (NS) 100 mL                                         HOLDING           Branc

h



     MINI-BAG                                         ONCE,           



                                                  Starting           



                                                  on Thu           



                                                  3/9/23 at           



                                                  1732,           



                                                  Until Fri           



                                                  3/10/23 at           



                                                  1731,           



                                                  Administer           



                                                  over 30           



                                                  Minutes,           



                                                  100            



                                                  mL<br>Reas           



                                                  on for           



                                                  Anti-Infec           



                                                  tive:           



                                                  Surgical           



                                                  Prophylaxi           



                                                  s<br&gt;Haile           



                                                  rgical           



                                                  Prophylaxi           



                                                  s:             



                                                  OB/GYN<br>           



                                                  Duration           



                                                  of             



                                                  therapy:           



                                                  within 24           



                                                  hours of           



                                                  surgery           

 

     acetaminoph       No             1000mg      1,000 mg,         

  [facility]



     en        3-09 03-09                          Oral, ONCE           ity of



     (TYLENOL)      21:45: 20:43                          NOW, 1           [place]



     tablet      00   :00                           dose, On           Medical



     1,000 mg                                         Thu 3/9/23           Branc

h



                                                  at 1545,           



                                                  Routine           

 

     D5W-LR IV      2023- No             1000mL      at 1-125           U

nivers



     infusion      3-09 03-10                          mL/hr, IV           ity o

f



     1,000 mL      21:25: 03:06                          Infusion,           [facility]

as



               04   :01                           TITRATE,           Medical



                                                  Starting           Branch



                                                  on Thu           



                                                  3/9/23 at           



                                                  1525,           



                                                  Until Thu           



                                                  3/9/23 at           



                                                  2106,           



                                                  Routine           

 

     busPIRone            Yes            10mg      Take 10 mg           Un

[facility]



     10 mg      3-09                               by mouth.           ity of



     tablet      19:34:                                              [address]

 

     ARIPiprazol            Yes            15mg      Take 15 mg           

[facility]



     e 15 mg      3-09                               by mouth.           ity of



     tablet      19:34:                                              [address]

 

     busPIRone            Yes            10mg      Take 10 mg           Un

[facility]



     10 mg      3-09                               by mouth.           ity of



     tablet      19:34:                                              [address]

 

     ARIPiprazol            Yes            15mg      Take 15 mg           

[facility]



     e 15 mg      3-09                               by mouth.           ity of



     tablet      19:34:                                              [address]

 

     acyclovir      2023-0 2023- No        [id number] 400mg      Take 1          

 [facility]



     400 mg      3- 05-01                          tablet by           ity of



     tablet      00:00: 04:59                          mouth in           [place]



               00   :00                           the            Medical



                                                  morning           Branch



                                                  and 1           



                                                  tablet at           



                                                  noon and 1           



                                                  tablet in           



                                                  the            



                                                  evening.           



                                                  Do all           



                                                  this for           



                                                  60 days.           

 

     acyclovir      2023-0 2023- No        [id number] 400mg      Take 1          

 [facility]



     400 mg      3- 05-01                          tablet by           ity of



     tablet      00:00: 04:59                          mouth in           [place]



               00   :00                           the            Medical



                                                  morning           [facility]



                                                  and 1           



                                                  tablet at           



                                                  noon and 1           



                                                  tablet in           



                                                  the            



                                                  evening.           



                                                  Do all           



                                                  this for           



                                                  60 days.           

 

     acyclovir      2023-0 2023- No        [id number] 400mg      Take 1          

 [facility]



     400 mg      3- 05-01                          tablet by           ity of



     tablet      00:00: 04:59                          mouth in           [place]



               00   :00                           the            Medical



                                                  morning           [facility]



                                                  and 1           



                                                  tablet at           



                                                  noon and 1           



                                                  tablet in           



                                                  the            



                                                  evening.           



                                                  Do all           



                                                  this for           



                                                  60 days.           

 

     acyclovir      2023-0 2023- No        [id number] 400mg      Take 1          

 [facility]



     400 mg      3--01                          tablet by           ity of



     tablet      00:00: 04:59                          mouth in           [place]



               00   :00                           the            Medical



                                                  morning           [facility]



                                                  and 1           



                                                  tablet at           



                                                  noon and 1           



                                                  tablet in           



                                                  the            



                                                  evening.           



                                                  Do all           



                                                  this for           



                                                  60 days.           

 

     acyclovir      2023-0 2023- No        [id number] 400mg      Take 1          

 [facility]



     400 mg      3- 05-01                          tablet by           ity of



     tablet      00:00: 04:59                          mouth in           [place]



               00   :00                           the            Medical



                                                  morning           [facility]



                                                  and 1           



                                                  tablet at           



                                                  noon and 1           



                                                  tablet in           



                                                  the            



                                                  evening.           



                                                  Do all           



                                                  this for           



                                                  60 days.           

 

     acyclovir      2023-0 2023- No        [id number] 400mg      Take 1          

 [facility]



     400 mg      3- 05-01                          tablet by           ity of



     tablet      00:00: 04:59                          mouth in           [place]



               00   :00                           the            Medical



                                                  morning           [facility]



                                                  and 1           



                                                  tablet at           



                                                  noon and 1           



                                                  tablet in           



                                                  the            



                                                  evening.           



                                                  Do all           



                                                  this for           



                                                  60 days.           

 

     acyclovir      2023-0 2023- No        [id number] 400mg      Take 1          

 [facility]



     400 mg      3- 05-01                          tablet by           ity of



     tablet      00:00: 04:59                          mouth in           [place]



               00   :00                           the            Medical



                                                  morning           [facility]



                                                  and 1           



                                                  tablet at           



                                                  noon and 1           



                                                  tablet in           



                                                  the            



                                                  evening.           



                                                  Do all           



                                                  this for           



                                                  60 days.           

 

     acyclovir      2023-0 2023- No        [id number] 400mg      Take 1          

 [facility]



     400 mg      3-01 04-21                          tablet by           ity of



     tablet      00:00: 00:00                          mouth in           [place]



               00   :00                           the            Medical



                                                  morning           [facility]



                                                  and 1           



                                                  tablet at           



                                                  noon and 1           



                                                  tablet in           



                                                  the            



                                                  evening.           



                                                  Do all           



                                                  this for           



                                                  60 days.           

 

     acyclovir      2023- No        [id number] 400mg      Take 1          

 [facility]



     400 mg      3-01 04-21                          tablet by           ity of



     tablet      00:00: 00:00                          mouth in           [place]



               00   :00                           the            [facility]



                                                  and 1           



                                                  tablet at           



                                                  noon and 1           



                                                  tablet in           



                                                  the            



                                                  evening.           



                                                  Do all           



                                                  this for           



                                                  60 days.           

 

     ondansetron      2023- No             8mg       8 mg,           [facility]

ers



     (ZOFRAN)                                Oral,           ity of



     tablet 8 mg      05:30: 05:06                          ONCE, 1           [facility]

[place]



               00   :00                           dose, On           [facility]



                                                  23 at           



                                                  2330,           



                                                  Routine           

 

     metroNIDAZO      2023- No             500mg      500 mg,           U

nivers



     LE (FLAGYL)                                Oral,           ity of



     tablet 500      05:30: 05:06                          ONCE, 1           [facility]

as



     mg        00   :00                           dose, On           [facility]



                                                  23 at           



                                                  2330,           



                                                  Routine<br           



                                                  >Reason           



                                                  for            



                                                  Anti-Infec           



                                                  tive:           



                                                  Documented           



                                                  Infection<           



                                                  br>Documen           



                                                  nick            



                                                  Infection           



                                                  Site:           



                                                  Pelvic<br>           



                                                  Duration           



                                                  of             



                                                  Therapy:           



                                                  Other (see           



                                                  Comments)           

 

     acetaminoph      2023- No             1000mg      1,000 mg,         

  [facility]



     en                                  Oral,           ity of



     (TYLENOL)      03:15: 03:11                          ONCE, 1           [place]

s



     tablet      00   :00                           dose, On           Medical



     1,000 mg                                         [facility]



                                                  23 at           



                                                  2115,           



                                                  Routine           

 

     busPIRone      0      Yes            10mg      Take 10 mg           Un

[place]



     10 mg      2-14                               by mouth.           ity of



     tablet      00:09:                                              [address]

 

     ARIPiprazol      -0      Yes            15mg      Take 15 mg           

[facility]



     e 15 mg      2-14                               by mouth.           ity of



     tablet      00:09:                                              [address]

 

     busPIRone      -0      Yes            10mg      Take 10 mg           Un

[place]



     10 mg      2-14                               by mouth.           ity of



     tablet      00:09:                                              [address]

 

     ARIPiprazol      2023-0      Yes            15mg      Take 15 mg           

[facility]



     e 15 mg      2-14                               by mouth.           ity of



     tablet      00:09:                                              [address]



                                                                 [facility]

 

     busPIRone      2023-0      Yes            10mg      Take 10 mg           Un

[place]



     10 mg      2-14                               by mouth.           ity of



     tablet      00:09:                                              [address]



                                                                 Branch

 

     ARIPiprazol      2023-0      Yes            15mg      Take 15 mg           

[facility]



     e 15 mg      2-14                               by mouth.           ity of



     tablet      00:09:                                              [address]



                                                                 Branch

 

     busPIRone      2023-0      Yes            10mg      Take 10 mg           Un

[place]



     10 mg      2-14                               by mouth.           ity of



     tablet      00:09:                                              [address]

 

     ARIPiprazol      2023-0      Yes            15mg      Take 15 mg           

[facility]



     e 15 mg      2-14                               by mouth.           ity of



     tablet      00:09:                                              [address]



                                                                 Branch

 

     metroNIDAZO      2023-0 2023- No        [id number] 500mg      Take 2      

     [facility]



      mg      2-14 -           9109           tablets by           ity

 of



     tablet      00:00: 05:59                          mouth           [place]



               00   :00                           every 12           Medical



                                                  (twelve)           Branch



                                                  hours for           



                                                  7 days.           

 

     ondansetron      2023-0 2023- No        [id number] 4mg       Take 1       

    [facility]



     4 mg tablet      2-           9109           tablet by           it

y of



               00:00: 05:59                          mouth           [place]



               00   :00                           every 12           Medical



                                                  (twelve)           Branch



                                                  hours as           



                                                  needed for           



                                                  Nausea and           



                                                  Vomiting           



                                                  (N/V) for           



                                                  up to 7           



                                                  days.           

 

     metroNIDAZO      2023-0 2023- No        [id number] 500mg      Take 2      

     [facility]



      mg      2-14            9109           tablets by           ity

 of



     tablet      00:00: 05:59                          mouth           [place]



               00   :00                           every 12           Medical



                                                  (twelve)           Branch



                                                  hours for           



                                                  7 days.           

 

     ondansetron      2023-0 2023- No        [id number] 4mg       Take 1       

    [facility]



     4 mg tablet      2-14            9109           tablet by           it

y of



               00:00: 05:59                          mouth           [place]



               00   :00                           every 12           Medical



                                                  (twelve)           Branch



                                                  hours as           



                                                  needed for           



                                                  Nausea and           



                                                  Vomiting           



                                                  (N/V) for           



                                                  up to 7           



                                                  days.           

 

     busPIRone      2023-0      Yes            10mg      Take 10 mg           Un

[place]



     10 mg      2-13                               by mouth.           ity of



     tablet      23:57:                                              [address]

 

     ARIPiprazol      2023-0      Yes            15mg      Take 15 mg           

[facility]



     e 15 mg      2-13                               by mouth.           ity of



     tablet      23:57:                                              [address]

 

     acetaminoph      2023- No             650mg      650 mg,           U

nivers



     en        17 -16                          Oral,           ity of



     (TYLENOL)      00:00: 23:21                          ONCE, 1           [place]

s



     tablet 650      00   :00                           dose, On           Medic

al



     mg                                           Mon            [facility]



                                                  23 at           



                                                  1800,           



                                                  Routine           

 

     busPIRone      0      Yes            10mg      Take 10 mg           Un

[place]



     10 mg      1-16                               by mouth.           ity of



     tablet      20:13:                                              [address]

 

     ARIPiprazol      0      Yes            15mg      Take 15 mg           

[facility]



     e 15 mg      1-16                               by mouth.           ity of



     tablet      20:13:                                              [address]

 

     busPIRone      0      Yes            10mg      Take 10 mg           Un

[place]



     10 mg      1-16                               by mouth.           ity of



     tablet      20:13:                                              [address]

 

     ARIPiprazol      0      Yes            15mg      Take 15 mg           

[facility]



     e 15 mg      1-16                               by mouth.           ity of



     tablet      20:13:                                              [address]

 

     busPIRone      0      Yes            10mg      Take 10 mg           Un

[place]



     10 mg      1-16                               by mouth.           ity of



     tablet      20:13:                                              [address]

 

     ARIPiprazol      0      Yes            15mg      Take 15 mg           

[facility]



     e 15 mg      1-16                               by mouth.           ity of



     tablet      20:13:                                              [address]

 

     prenatal      0      Yes       [id number] 1{packe      Take 1           

[facility]



     vit       1-04                     t}        Packet by           ity of



     33-iron-fol      00:00:                               mouth in           [place]

[place]



     ic-dha      00                                 the            Medical



     (SELECT-OB                                         morning.           [facility]

h



     + DHA) 29                                                        



     mg iron-1                                                        



     mg -250 mg                                                        



     combo pack                                                        

 

     prenatal            Yes       [id number] 1{packe      Take 1           

[facility]



     vit       1-04                     t}        Packet by           ity of



     33-iron-fol      00:00:                               mouth in           [place]

[place]



     ic-dha      00                                 the            Medical



     (SELECT-OB                                         morning.           [facility]

h



     + DHA) 29                                                        



     mg iron-1                                                        



     mg -250 mg                                                        



     combo pack                                                        

 

     prenatal      0      Yes       [id number] 1{packe      Take 1           

[facility]



     vit       1-04                     t}        Packet by           ity of



     33-iron-fol      00:00:                               mouth in           [place]

[place]



     ic-dha      00                                 the            Medical



     (SELECT-OB                                         morning.           [facility]

h



     + DHA) 29                                                        



     mg iron-1                                                        



     mg -250 mg                                                        



     combo pack                                                        

 

     prenatal            Yes       [id number] 1{packe      Take 1           

[facility]



     vit       1-04                     t}        Packet by           ity of



     33-iron-fol      00:00:                               mouth in           [place]

[place]



     ic-dha      00                                 the            Medical



     (SELECT-OB                                         morning.           [facility]

h



     + DHA) 29                                                        



     mg iron-1                                                        



     mg -250 mg                                                        



     combo pack                                                        

 

     prenatal            Yes       [id number] 1{packe      Take 1           

[facility]



     vit       1-04                     t}        Packet by           ity of



     33-iron-fol      00:00:                               mouth in           [place]

[place]



     ic-dha      00                                 the            Medical



     (SELECT-OB                                         morning.           [facility]

h



     + DHA) 29                                                        



     mg iron-1                                                        



     mg -250 mg                                                        



     combo pack                                                        

 

     prenatal            Yes       [id number] 1{packe      Take 1           

[facility]



     vit       1-04                     t}        Packet by           ity of



     33-iron-fol      00:00:                               mouth in           [place]

[place]



     ic-dha      00                                 the            Medical



     (SELECT-OB                                         morning.           [facility]

h



     + DHA) 29                                                        



     mg iron-1                                                        



     mg -250 mg                                                        



     combo pack                                                        

 

     prenatal            Yes       [id number] 1{packe      Take 1           

[facility]



     vit       1-04                     t}        Packet by           ity of



     33-iron-fol      00:00:                               mouth in           [place]

[place]



     ic-dha      00                                 the            Medical



     (SELECT-OB                                         morning.           [facility]

h



     + DHA) 29                                                        



     mg iron-1                                                        



     mg -250 mg                                                        



     combo pack                                                        

 

     prenatal            Yes       [id number] 1{packe      Take 1           

[facility]



     vit       1-04                     t}        Packet by           ity of



     33-iron-fol      00:00:                               mouth in           [place]

[place]



     ic-dha      00                                 the            Medical



     (SELECT-OB                                         morning.           [facility]

h



     + DHA) 29                                                        



     mg iron-1                                                        



     mg -250 mg                                                        



     combo pack                                                        

 

     prenatal            Yes       [id number] 1{packe      Take 1           

[facility]



     vit       1-04                     t}        Packet by           [facility]



     33-iron-fol      00:00:                               mouth in           [facility]

[place]



     ic-dha      00                                 the            Medical



     (SELECT-OB                                         morning.           [facility]

h



     + DHA) 29                                                        



     mg iron-1                                                        



     mg -250 mg                                                        



     combo pack                                                        

 

     prenatal            Yes       [id number] 1{packe      Take 1           

[facility]



     vit       1-04                     t}        Packet by           [facility] of



     33-iron-fol      00:00:                               mouth in           [facility]

[place]



     ic-dha      00                                 the            Medical



     (SELECT-OB                                         morning.           [facility]

[facility]



     + DHA) 29                                                        



     mg iron-1                                                        



     mg -250 mg                                                        



     combo pack                                                        

 

     prenatal            Yes       [id number] 1{packe      Take 1           

[facility]



     vit       1-04                     t}        Packet by           [facility]



     33-iron-fol      00:00:                               mouth in           [facility]

[place]



     ic-dha      00                                 the            Medical



     (SELECT-OB                                         morning.           [facility]

[facility]



     + DHA) 29                                                        



     mg iron-1                                                        



     mg -250 mg                                                        



     combo pack                                                        

 

     prenatal            Yes       [id number] 1{packe      Take 1           

[facility]



     vit       1-04                     t}        Packet by           [facility]



     33-iron-fol      00:00:                               mouth in           [facility]

[place]



     ic-dha      00                                 the            Medical



     (SELECT-OB                                         morning.           [facility]

[facility]



     + DHA) 29                                                        



     mg iron-1                                                        



     mg -250 mg                                                        



     combo pack                                                        

 

     prenatal            Yes       [id number] 1{packe      Take 1           

[facility]



     vit       1-04                     t}        Packet by           [facility] of



     33-iron-fol      00:00:                               mouth in           [facility]

[place]



     ic-dha      00                                 the            Medical



     (SELECT-OB                                         morning.           [facility]

h



     + DHA) 29                                                        



     mg iron-1                                                        



     mg -250 mg                                                        



     combo pack                                                        

 

     prenatal            Yes       [id number] 1{packe      Take 1           

[facility]



     vit       1-04                     t}        Packet by           [facility] of



     33-iron-fol      00:00:                               mouth in           [facility]

[place]



     ic-dha      00                                 the            Medical



     (SELECT-OB                                         morning.           [facility]

h



     + DHA) 29                                                        



     mg iron-1                                                        



     mg -250 mg                                                        



     combo pack                                                        

 

     prenatal            Yes       [id number] 1{packe      Take 1           

[facility]



     vit       1-04                     t}        Packet by           [facility] of



     33-iron-fol      00:00:                               mouth in           [facility]

[place]



     ic-dha      00                                 the            Medical



     (SELECT-OB                                         morning.           [facility]

h



     + DHA) 29                                                        



     mg iron-1                                                        



     mg -250 mg                                                        



     combo pack                                                        

 

     prenatal            Yes       [id number] 1{packe      Take 1           

[facility]



     vit       1-04                     t}        Packet by           [facility] of



     33-iron-fol      00:00:                               mouth in           [facility]

[place]



     ic-dha      00                                 the            Medical



     (SELECT-OB                                         morning.           [facility]

[facility]



     + DHA) 29                                                        



     mg iron-1                                                        



     mg -250 mg                                                        



     combo pack                                                        

 

     prenatal       2023- No        [id number] 1{packe      Take 1          

 [facility]



     vit       1-04 04-21                t}        Packet by           [facility] of



     33-iron-fol      00:00: 00:00                          mouth in           [facility]

[place]



     ic-dha      00   :00                           the            [facility]



     (SELECT-OB                                         morning.           [facility]

[facility]



     + DHA) 29                                                        



     mg iron-1                                                        



     mg -250 mg                                                        



     combo pack                                                        

 

     prenatal      2023- No        [id number] 1{packe      Take 1          

 [facility]



     vit                       t}        Packet by           [facility]



     33-iron-fol      00:00: 00:00                          mouth in           [facility]

[place]



     ic-dha      00   :00                           the            Medical



     (SELECT-OB                                         morning.           [facility]

h



     + DHA) 29                                                        



     mg iron-1                                                        



     mg -250 mg                                                        



     combo pack                                                        

 

     diphenoxyla      2022- No             1{tbl}      1 tablet,         

  [facility]



     te-atropine                                Oral,           ity [facility]



     (LOMOTIL)      05:21: 05:32                          ONCE, 1           [place]

[facility]



     2.5-0.025      00   :00                           dose, On           [facility]

l



     mg tablet 1                                         Wed            [facility]



     tablet                                         22           



                                                  at 2330,           



                                                  ASAP           

 

     metroNIDAZO      2022 No             500mg      500 mg,           [facility]

[facility]



     LE (FLAGYL)                                Oral,           [facility]



     tablet 500      05:15: 01:59                          Q12H, 14           [facility]

[facility]



     mg        00   :00                           doses,           Medical



                                                  First dose           [facility]



                                                  on 22           



                                                  at 2315,           



                                                  Last dose           



                                                  on 23 at           



                                                  0800,           



                                                  Routine<br           



                                                  >Reason           



                                                  for            



                                                  Anti-Infec           



                                                  tive:           



                                                  Documented           



                                                  Infection<           



                                                  br>Documen           



                                                  nick            



                                                  Infection           



                                                  Site:           



                                                  Pelvic<br>           



                                                  Duration           



                                                  of             



                                                  Therapy: 7           



                                                  days           

 

     acetaminoph      2022      Yes            650mg      650 mg,           [facility]

[facility]



     en                                       Oral,           ity of



     (TYLENOL)      05:04:                               Q6HPRN,           [place]



     tablet 650      21                                 Starting           [facility]

al



     mg                                           on Wed           [facility]



                                                  22           



                                                  at 2304,           



                                                  Until           



                                                  Discontinu           



                                                  ed,            



                                                  Routine,           



                                                  Pain           



                                                  (scale           



                                                  4-6)           

 

     busPIRone      2022      Yes            10mg      Take 10 mg           [facility]

[facility]



     10 mg      -29                               by mouth.           ity of



     tablet      04:10:                                              [address]

 

     ARIPiprazol      2022      Yes            15mg      Take 15 mg           

[facility]



     e 15 mg      -29                               by mouth.           ity of



     tablet      04:10:                                              [address]

 

     SERTraline      2022      Yes            100mg      Take 100           [facility]

[facility]



     100 mg      2-29                               mg by           ity of



     tablet      01:56:                               mouth.           [address]

 

     busPIRone      2022      Yes            10mg      Take 10 mg           [facility]

[facility]



     10 mg      2-29                               by mouth.           ity of



     tablet      01:56:                                              [place]



               [address] [place] [address]

 

     ARIPiprazol      2022      Yes            15mg      Take 15 mg           

[facility]



     e 15 mg      2-29                               by mouth.           ity of



     tablet      01:56:                                              [address]

 

     metroNIDAZO      2022      Yes       [id number] 500mg      Take 1         

  [facility]



      mg      2-29                               tablet by           ity o

f



     tablet      00:00:                               mouth in           [place]



               00                                 the            Medical



                                                  morning           [facility]



                                                  and 1           



                                                  tablet in           



                                                  the            



                                                  evening.           

 

     metroNIDAZO      2022      Yes       [id number] 500mg      Take 1         

  [facility]



      mg      2-29                               tablet by           ity o

f



     tablet      00:00:                               mouth in           [place]



                                                the            [facility]



                                                  morning           [facility]



                                                  and 1           



                                                  tablet in           



                                                  the            



                                                  evening.           

 

     metroNIDAZO      2022      Yes       [id number] 500mg      Take 1         

  [facility]



      mg      2-29                               tablet by           ity o

f



     tablet      00:00:                               mouth in           [place]



                                                the            Medical



                                                  morning           [facility]



                                                  and 1           



                                                  tablet in           



                                                  the            



                                                  evening.           

 

     metroNIDAZO      2022      Yes       [id number] 500mg      Take 1         

  [facility]



      mg      2-29                               tablet by           ity o

f



     tablet      00:00:                               mouth in           [place]



               00                                 the            Medical



                                                  morning           [facility]



                                                  and 1           



                                                  tablet in           



                                                  the            



                                                  evening.           

 

     metroNIDAZO      2022      Yes       [id number] 500mg      Take 1         

  [facility]



      mg      2-29                               tablet by           ity o

f



     tablet      00:00:                               mouth in           [place]



                                                the            [facility]



                                                  morning           [facility]



                                                  and 1           



                                                  tablet in           



                                                  the            



                                                  evening.           

 

     metroNIDAZO      2022      Yes       [id number] 500mg      Take 1         

  [facility]



      mg      2-29                               tablet by           ity o

f



     tablet      00:00:                               mouth in           [place]



                                                the            [facility]



                                                  morning           [facility]



                                                  and 1           



                                                  tablet in           



                                                  the            



                                                  evening.           

 

     metroNIDAZO      2022      Yes       [id number] 500mg      Take 1         

  [facility]



      mg      2-29                               tablet by           ity o

f



     tablet      00:00:                               mouth in           [place]



                                                the            [facility]



                                                  morning           [facility]



                                                  and 1           



                                                  tablet in           



                                                  the            



                                                  evening.           

 

     metroNIDAZO      2022      Yes       [id number] 500mg      Take 1         

  [facility]



      mg      2-29                               tablet by           ity o

f



     tablet      00:00:                               mouth in           [place]



               00                                 the            Medical



                                                  morning           [facility]



                                                  and 1           



                                                  tablet in           



                                                  the            



                                                  evening.           

 

     metroNIDAZO      2022      Yes       [id number] 500mg      Take 1         

  [facility]



      mg      2-29                               tablet by           ity o

f



     tablet      00:00:                               mouth in           [place]



               00                                 the            Medical



                                                  morning           [facility]



                                                  and 1           



                                                  tablet in           



                                                  the            



                                                  evening.           

 

     metroNIDAZO      2022      Yes       [id number] 500mg      Take 1         

  [facility]



      mg      2-29                               tablet by           ity o

f



     tablet      00:00:                               mouth in           [place]



               00                                 the            Medical



                                                  morning           [facility]



                                                  and 1           



                                                  tablet in           



                                                  the            



                                                  evening.           

 

     metroNIDAZO      2022      Yes       [id number] 500mg      Take 1         

  [facility]



      mg      2-29                               tablet by           ity o

f



     tablet      00:00:                               mouth in           [place]



               00                                 the            Medical



                                                  morning           [facility]



                                                  and 1           



                                                  tablet in           



                                                  the            



                                                  evening.           

 

     metroNIDAZO      2022      Yes       [id number] 500mg      Take 1         

  [facility]



      mg      2-29                               tablet by           ity o

f



     tablet      00:00:                               mouth in           [place]



               00                                 the            Medical



                                                  morning           [facility]



                                                  and 1           



                                                  tablet in           



                                                  the            



                                                  evening.           

 

     metroNIDAZO      2022      Yes       [id number] 500mg      Take 1         

  [facility]



      mg      2-29                               tablet by           ity o

f



     tablet      00:00:                               mouth in           [place]



               00                                 the            Medical



                                                  morning           [facility]



                                                  and 1           



                                                  tablet in           



                                                  the            



                                                  evening.           

 

     metroNIDAZO      2022      Yes       [id number] 500mg      Take 1         

  [facility]



      mg      2-29                               tablet by           ity o

f



     tablet      00:00:                               mouth in           [place]



               00                                 the            Medical



                                                  morning           [facility]



                                                  and 1           



                                                  tablet in           



                                                  the            



                                                  evening.           

 

     metroNIDAZO      2022      Yes       [id number] 500mg      Take 1         

  [facility]



      mg      2-29                               tablet by           ity o

f



     tablet      00:00:                               mouth in           [place]



               00                                 the            Medical



                                                  morning           [facility]



                                                  and 1           



                                                  tablet in           



                                                  the            



                                                  evening.           

 

     metroNIDAZO      2022      Yes       [id number] 500mg      Take 1         

  [facility]



      mg      2-29                               tablet by           ity o

f



     tablet      00:00:                               mouth in           [place]



               00                                 the            Medical



                                                  morning           [facility]



                                                  and 1           



                                                  tablet in           



                                                  the            



                                                  evening.           

 

     metroNIDAZO      2022- No        [id number] 500mg      Take 1        

   [facility]



      mg      2-29 04-21                          tablet by           ity 

of



     tablet      00:00: 00:00                          mouth in           [place]



               00   :00                           the            Medical



                                                  morning           [facility]



                                                  and 1           



                                                  tablet in           



                                                  the            



                                                  evening.           

 

     metroNIDAZO      2022- No        [id number] 500mg      Take 1        

   [facility]



      mg      2-29 04-21                          tablet by           ity 

of



     tablet      00:00: 00:00                          mouth in           [place]



               00   :00                           the            Medical



                                                  morning           [facility]



                                                  and 1           



                                                  tablet in           



                                                  the            



                                                  evening.           

 

     metroNIDAZO      2022- No        [id number] 500mg      Take 1        

   [facility]



     LE (FLAGYL)      2-21 12-29                          tablet by           it

y of



     500 mg      00:00: 05:59                          mouth           [place]



     tablet      00   :00                           every 12           Medical



                                                  (twelve)           [facility]



                                                  hours for           



                                                  7 days.           

 

     metroNIDAZO      2022- No        [id number] 500mg      Take 1        

   [facility]



     LE (FLAGYL)      2-21                           tablet by           it

y of



     500 mg      00:00: 05:59                          mouth           [place]



     tablet      00   :00                           every 12           Medical



                                                  (twelve)           [facility]



                                                  hours for           



                                                  7 days.           

 

     metroNIDAZO      2022- No        [id number] 500mg      Take 1        

   [facility]



     LE (FLAGYL)      2-21                           tablet by           it

y of



     500 mg      00:00: 05:59                          mouth           [place]



     tablet      00   :00                           every 12           Medical



                                                  (twelve)           [facility]



                                                  hours for           



                                                  7 days.           

 

     metroNIDAZO      2022- No        [id number] 500mg      Take 1        

   [facility]



     LE (FLAGYL)      2-21                           tablet by           it

y of



     500 mg      00:00: 05:59                          mouth           [place]



     tablet      00   :00                           every 12           Medical



                                                  (twelve)           [facility]



                                                  hours for           



                                                  7 days.           

 

     metroNIDAZO      2022- No        [id number] 500mg      Take 1        

   [facility]



     LE (FLAGYL)      2-                          tablet by           it

y of



     500 mg      00:00: 05:59                          mouth           [place]



     tablet      00   :00                           every 12           Medical



                                                  (twelve)           [facility]



                                                  hours for           



                                                  7 days.           

 

     SERTraline      0      Yes            100mg      Take 100           Un

[facility]



     100 mg      9-28                               mg by           ity of



     tablet      11:23:                               mouth.           [address]

 

     busPIRone      0      Yes            10mg      Take 10 mg           Un

[facility]



     10 mg      9-28                               by mouth.           ity of



     tablet      11:23:                                              [address]

 

     ARIPiprazol      -0      Yes            15mg      Take 15 mg           

[facility]



     e 15 mg      9-28                               by mouth.           ity of



     tablet      11:23:                                              [address]

 

     SERTraline      -0      Yes            100mg      Take 100           Un

[facility]



     100 mg      9-28                               mg by           ity of



     tablet      11:23:                               mouth.           [address]

 

     busPIRone      -0      Yes            10mg      Take 10 mg           Un

[facility]



     10 mg      9-28                               by mouth.           ity of



     tablet      11:23:                                              [address]

 

     ARIPiprazol      -0      Yes            15mg      Take 15 mg           

[facility]



     e 15 mg      9-28                               by mouth.           ity of



     tablet      11:23:                                              [address]

 

     SERTraline      0      Yes            100mg      Take 100           Un

[facility]



     100 mg      9-28                               mg by           ity of



     tablet      11:23:                               mouth.           [address]

 

     busPIRone      0      Yes            10mg      Take 10 mg           Un

[facility]



     10 mg      9-28                               by mouth.           ity of



     tablet      11:23:                                              [address]

 

     ARIPiprazol      0      Yes            15mg      Take 15 mg           

[facility]



     e 15 mg      9-28                               by mouth.           ity of



     tablet      11:23:                                              [address]

 

     SERTraline      -      Yes            100mg      Take 100           Un

[facility]



     100 mg      9-28                               mg by           ity of



     tablet      11:23:                               mouth.           [address]

 

     busPIRone            Yes            10mg      Take 10 mg           Un

[facility]



     10 mg      9-28                               by mouth.           ity of



     tablet      11:23:                                              [address]

 

     ARIPiprazol            Yes            15mg      Take 15 mg           

[facility]



     e 15 mg      9-28                               by mouth.           ity of



     tablet      11:23:                                              [address]

 

     SERTraline      -      Yes            100mg      Take 100           Un

[facility]



     100 mg      9-28                               mg by           ity of



     tablet      11:23:                               mouth.           [address]

 

     busPIRone            Yes            10mg      Take 10 mg           Un

[facility]



     10 mg      9-28                               by mouth.           ity of



     tablet      11:23:                                              [address]

 

     ARIPiprazol            Yes            15mg      Take 15 mg           

[facility]



     e 15 mg      9-28                               by mouth.           ity of



     tablet      11:23:                                              [address]

 

     SERTraline            Yes            100mg      Take 100           Un

[facility]



     100 mg      9-28                               mg by           ity of



     tablet      11:23:                               mouth.           [address]

 

     busPIRone      0      Yes            10mg      Take 10 mg           Un

[facility]



     10 mg      9-28                               by mouth.           ity of



     tablet      11:23:                                              [address]

 

     ARIPiprazol      0      Yes            15mg      Take 15 mg           

[facility]



     e 15 mg      9-28                               by mouth.           ity of



     tablet      11:23:                                              [address]

 

     SERTraline      -0      Yes            100mg      Take 100           Un

[facility]



     100 mg      9-28                               mg by           ity of



     tablet      11:23:                               mouth.           [address]

 

     busPIRone      -0      Yes            10mg      Take 10 mg           Un

[facility]



     10 mg      9-28                               by mouth.           ity of



     tablet      11:23:                                              [address]

 

     ARIPiprazol            Yes            15mg      Take 15 mg           

[facility]



     e 15 mg      9-28                               by mouth.           ity of



     tablet      11:23:                                              [address]

 

     SERTraline            Yes            100mg      Take 100           Un

[facility]



     100 mg      9-28                               mg by           ity of



     tablet      11:23:                               mouth.           [address]

 

     busPIRone            Yes            10mg      Take 10 mg           Un

[facility]



     10 mg      9-28                               by mouth.           ity of



     tablet      11:23:                                              [address]

 

     ARIPiprazol            Yes            15mg      Take 15 mg           

[facility]



     e 15 mg      9-28                               by mouth.           ity of



     tablet      11:23:                                              [address]

 

     SERTraline            Yes            100mg      Take 100           Un

[facility]



     100 mg      9-28                               mg by           ity of



     tablet      11:23:                               mouth.           [address]

 

     busPIRone            Yes            10mg      Take 10 mg           Un

[facility]



     10 mg      9-28                               by mouth.           ity of



     tablet      11:23:                                              [address]

 

     ARIPiprazol            Yes            15mg      Take 15 mg           

[facility]



     e 15 mg      9-28                               by mouth.           ity of



     tablet      11:23:                                              [address]

 

     SERTraline            Yes            100mg      Take 100           Un

[facility]



     100 mg      9-28                               mg by           ity of



     tablet      11:23:                               mouth.           [address]

 

     busPIRone            Yes            10mg      Take 10 mg           Un

[facility]



     10 mg      9-28                               by mouth.           ity of



     tablet      11:23:                                              [address]

 

     ARIPiprazol            Yes            15mg      Take 15 mg           

[facility]



     e 15 mg      9-28                               by mouth.           ity of



     tablet      11:23:                                              [address]

 

     SERTraline            Yes            100mg      Take 100           Un

[facility]



     100 mg      9-28                               mg by           ity of



     tablet      11:23:                               mouth.           [address]

 

     busPIRone            Yes            10mg      Take 10 mg           Un

[facility]



     10 mg      9-28                               by mouth.           ity of



     tablet      11:23:                                              [address]

 

     ARIPiprazol            Yes            15mg      Take 15 mg           

[facility]



     e 15 mg      9-28                               by mouth.           ity of



     tablet      11:23:                                              [address]

 

     SERTraline            Yes            100mg      Take 100           Un

[facility]



     100 mg      9-28                               mg by           ity of



     tablet      11:23:                               mouth.           [address]

 

     busPIRone      0      Yes            10mg      Take 10 mg           Un

[facility]



     10 mg      9-28                               by mouth.           ity of



     tablet      11:23:                                              [address]

 

     ARIPiprazol            Yes            15mg      Take 15 mg           

[facility]



     e 15 mg      9-28                               by mouth.           ity of



     tablet      11:23:                                              [address]

 

     SERTraline      -0      Yes            100mg      Take 100           Un

[facility]



     100 mg      9-28                               mg by           ity of



     tablet      11:23:                               mouth.           [address]

 

     busPIRone            Yes            10mg      Take 10 mg           Un

[facility]



     10 mg      9-28                               by mouth.           ity of



     tablet      11:23:                                              [address]

 

     ARIPiprazol            Yes            15mg      Take 15 mg           

[facility]



     e 15 mg      9-28                               by mouth.           ity of



     tablet      11:23:                                              [address]

 

     SERTraline      -      Yes            100mg      Take 100           Un

[facility]



     100 mg      9-28                               mg by           ity of



     tablet      11:23:                               mouth.           [address]

 

     busPIRone            Yes            10mg      Take 10 mg           Un

[facility]



     10 mg      9-28                               by mouth.           ity of



     tablet      11:23:                                              [address]

 

     ARIPiprazol            Yes            15mg      Take 15 mg           

[facility]



     e 15 mg      9-28                               by mouth.           ity of



     tablet      11:23:                                              [address]

 

     SERTraline      -      Yes            100mg      Take 100           Un

[facility]



     100 mg      9-28                               mg by           ity of



     tablet      11:23:                               mouth.           [address]

 

     busPIRone      -      Yes            10mg      Take 10 mg           Un

[facility]



     10 mg      9-28                               by mouth.           ity of



     tablet      11:23:                                              [address]

 

     ARIPiprazol      0      Yes            15mg      Take 15 mg           

[facility]



     e 15 mg      9-28                               by mouth.           ity of



     tablet      11:23:                                              [address]

 

     SERTraline      -0      Yes            100mg      Take 100           Un

[facility]



     100 mg      9-28                               mg by           ity of



     tablet      11:23:                               mouth.           [address]

 

     busPIRone      -0      Yes            10mg      Take 10 mg           Un

[facility]



     10 mg      9-28                               by mouth.           ity of



     tablet      11:23:                                              [address]

 

     ARIPiprazol            Yes            15mg      Take 15 mg           

[facility]



     e 15 mg      9-28                               by mouth.           ity of



     tablet      11:23:                                              [address]

 

     SERTraline            Yes            100mg      Take 100           Un

[facility]



     100 mg      9-28                               mg by           ity of



     tablet      11:23:                               mouth.           [address]

 

     busPIRone            Yes            10mg      Take 10 mg           Un

[facility]



     10 mg      9-28                               by mouth.           ity of



     tablet      11:23:                                              [address]

 

     ARIPiprazol            Yes            15mg      Take 15 mg           

[facility]



     e 15 mg      9-28                               by mouth.           ity of



     tablet      11:23:                                              [address]

 

     SERTraline            Yes            100mg      Take 100           Un

[facility]



     100 mg      9-28                               mg by           ity of



     tablet      11:23:                               mouth.           [address]

 

     busPIRone            Yes            10mg      Take 10 mg           Un

[facility]



     10 mg      9-28                               by mouth.           ity of



     tablet      11:23:                                              [address]

 

     ARIPiprazol            Yes            15mg      Take 15 mg           

[facility]



     e 15 mg      9-28                               by mouth.           ity of



     tablet      11:23:                                              [address]

 

     SERTraline            Yes            100mg      Take 100           Un

[facility]



     100 mg      9-28                               mg by           ity of



     tablet      11:23:                               mouth.           [address]

 

     busPIRone            Yes            10mg      Take 10 mg           Un

[facility]



     10 mg      9-28                               by mouth.           ity of



     tablet      11:23:                                              [address]

 

     ARIPiprazol            Yes            15mg      Take 15 mg           

[facility]



     e 15 mg      9-28                               by mouth.           ity of



     tablet      11:23:                                              [address]

 

     SERTraline            Yes            100mg      Take 100           Un

[facility]



     100 mg      9-28                               mg by           ity of



     tablet      11:23:                               mouth.           [address]

 

     busPIRone            Yes            10mg      Take 10 mg           Un

[facility]



     10 mg      9-28                               by mouth.           ity of



     tablet      11:23:                                              [address]

 

     ARIPiprazol            Yes            15mg      Take 15 mg           

[facility]



     e 15 mg      9-28                               by mouth.           ity of



     tablet      11:23:                                              [address]

 

     SERTraline      2022-0      Yes            100mg      Take 100           Un

[facility]



     100 mg      9-28                               mg by           ity of



     tablet      11:23:                               mouth.           [address]

 

     busPIRone      -0      Yes            10mg      Take 10 mg           Un

[facility]



     10 mg      9-28                               by mouth.           ity of



     tablet      11:23:                                              [address]

 

     ARIPiprazol      -0      Yes            15mg      Take 15 mg           

[facility]



     e 15 mg      9-28                               by mouth.           ity of



     tablet      11:23:                                              [address]

 

     SERTraline      -0      Yes            100mg      Take 100           Un

[facility]



     100 mg      9-28                               mg by           ity of



     tablet      11:23:                               mouth.           [address]

 

     busPIRone      -0      Yes            10mg      Take 10 mg           Un

[facility]



     10 mg      9-28                               by mouth.           ity of



     tablet      11:23:                                              [address]

 

     ARIPiprazol      -0      Yes            15mg      Take 15 mg           

[facility]



     e 15 mg      9-28                               by mouth.           ity of



     tablet      11:23:                                              [address]

 

     SERTraline      -0      Yes            100mg      Take 100           Un

[facility]



     100 mg      9-28                               mg by           ity of



     tablet      11:23:                               mouth.           [address]

 

     busPIRone      -0      Yes            10mg      Take 10 mg           Un

[facility]



     10 mg      9-28                               by mouth.           ity of



     tablet      11:23:                                              [address]

 

     ARIPiprazol      -0      Yes            15mg      Take 15 mg           

[facility]



     e 15 mg      9-28                               by mouth.           ity of



     tablet      11:23:                                              [address]

 

     NaCl 0.9%      2022- No             1000mL      at 500           Uni

vers



     (NS) IV                                mL/hr,           ity of



     infusion      00:30: 00:47                          [facility]           [facility]

[facility]



     1,000 mL      00   :00                           s, ONCE, 1           Medic

al



                                                  dose, On           [facility]



                                                  Fri            



                                                  22 at           



                                                  1930,           



                                                  Routine           

 

     cefTRIAXone      2022- No             1000mg      1,000 mg,         

  [facility]



     (ROCEPHIN)                                IV             ity of



     1,000 mg in      23:00: 00:40                          Piggyback,          

 [place]



     NaCl 0.9%      00   :00                           ONCE, 1           Medical



     (NS) 50 mL                                         dose, On           [facility]



     MINI-BAG                                         22 at           



                                                  1800,           



                                                  Administer           



                                                  over 30           



                                                  Minutes,           



                                                  50             



                                                  mL<br>Reas           



                                                  on for           



                                                  Anti-Infec           



                                                  tive:           



                                                  Documented           



                                                  Infection<           



                                                  br>Corby romero            



                                                  Infection           



                                                  Site:           



                                                  Urine<br&g           



                                                  t;Duration           



                                                  of             



                                                  Therapy: 7           



                                                  days           

 

     ondansetron       No             4mg       4 mg, Slow          

 [facility]



     (ZOFRAN      16                          IV Push,           ity of



     (PF))      22:15: 22:25                          ONCE, 1           [place]



     injection 4      00   :00                           dose, On           Medi

neil



     mg                                           Fri            [facility]



                                                  22 at           



                                                  1715, ASAP           

 

     Nitrofurant            Yes       [id number] 100mg      Take 1         

  [facility]



     oin&Nit.      9-16                               capsule by           ity o

f



     [facility]      00:00:                               mouth in           [place]

as



     (MACROBID)      00                                 the            Medical



     100 mg                                         morning           [facility]



     capsule                                         and 1           



                                                  capsule in           



                                                  the            



                                                  evening.           

 

     Nitrofurant            Yes       [id number] 100mg      Take 1         

  [facility]



     oin&Nit.      9-16                               capsule by           ity o

f



     [facility]      00:00:                               mouth in           [place]

as



     (MACROBID)      00                                 the            Medical



     100 mg                                         morning           [facility]



     capsule                                         and 1           



                                                  capsule in           



                                                  the            



                                                  evening.           

 

     Nitrofurant            Yes       [id number] 100mg      Take 1         

  [facility]



     oin&Nit.      9-16                               capsule by           ity o

f



     [facility]      00:00:                               mouth in           [place]

as



     (MACROBID)      00                                 the            Medical



     100 mg                                         morning           [facility]



     capsule                                         and 1           



                                                  capsule in           



                                                  the            



                                                  evening.           

 

     Nitrofurant            Yes       [id number] 100mg      Take 1         

  [facility]



     oin&Nit.      9-16                               capsule by           ity o

f



     [facility]      00:00:                               mouth in           [place]

as



     (MACROBID)      00                                 the            Medical



     100 mg                                         morning           [facility]



     capsule                                         and 1           



                                                  capsule in           



                                                  the            



                                                  evening.           

 

     Nitrofurant            Yes       [id number] 100mg      Take 1         

  [facility]



     oin&Nit.      9-16                               capsule by           ity o

f



     [facility]      00:00:                               mouth in           [place]

as



     (MACROBID)      00                                 the            Medical



     100 mg                                         morning           [facility]



     capsule                                         and 1           



                                                  capsule in           



                                                  the            



                                                  evening.           

 

     Nitrofurant            Yes       [id number] 100mg      Take 1         

  [facility]



     oin&Nit.      9-16                               capsule by           ity o

f



     [facility]      00:00:                               mouth in           [place]

as



     (MACROBID)      00                                 the            Medical



     100 mg                                         morning           [facility]



     capsule                                         and 1           



                                                  capsule in           



                                                  the            



                                                  evening.           

 

     Nitrofurant            Yes       [id number] 100mg      Take 1         

  [facility]



     oin&Nit.      9-16                               capsule by           ity o

f



     [facility]      00:00:                               mouth in           [place]

as



     (MACROBID)      00                                 the            Medical



     100 mg                                         morning           [facility]



     capsule                                         and 1           



                                                  capsule in           



                                                  the            



                                                  evening.           

 

     Nitrofurant      0      Yes       [id number] 100mg      Take 1         

  [facility]



     oin&Nit.      9-16                               capsule by           ity o

f



     [facility]      00:00:                               mouth in           [place]

as



     (MACROBID)      00                                 the            Medical



     100 mg                                         morning           [facility]



     capsule                                         and 1           



                                                  capsule in           



                                                  the            



                                                  evening.           

 

     Nitrofurant      0      Yes       [id number] 100mg      Take 1         

  [facility]



     oin&Nit.      9-16                               capsule by           ity o

f



     [facility]      00:00:                               mouth in           [place]

as



     (MACROBID)      00                                 the            Medical



     100 mg                                         morning           [facility]



     capsule                                         and 1           



                                                  capsule in           



                                                  the            



                                                  evening.           

 

     Nitrofurant            Yes       [id number] 100mg      Take 1         

  [facility]



     oin&Nit.      9-16                               capsule by           ity o

f



     [facility]      00:00:                               mouth in           [place]

as



     (MACROBID)      00                                 the            Medical



     100 mg                                         morning           [facility]



     capsule                                         and 1           



                                                  capsule in           



                                                  the            



                                                  evening.           

 

     Nitrofurant            Yes       [id number] 100mg      Take 1         

  [facility]



     oin&Nit.      9-16                               capsule by           ity o

f



     [facility]      00:00:                               mouth in           [place]

as



     (MACROBID)      00                                 the            Medical



     100 mg                                         morning           [facility]



     capsule                                         and 1           



                                                  capsule in           



                                                  the            



                                                  evening.           

 

     Nitrofurant            Yes       [id number] 100mg      Take 1         

  [facility]



     oin&Nit.      9-16                               capsule by           ity o

f



     [facility]      00:00:                               mouth in           [place]

as



     (MACROBID)      00                                 the            Medical



     100 mg                                         morning           [facility]



     capsule                                         and 1           



                                                  capsule in           



                                                  the            



                                                  evening.           

 

     Nitrofurant      0      Yes       [id number] 100mg      Take 1         

  [facility]



     oin&Nit.      9-16                               capsule by           ity o

f



     [facility]      00:00:                               mouth in           [place]

as



     (MACROBID)      00                                 the            Medical



     100 mg                                         morning           [facility]



     capsule                                         and 1           



                                                  capsule in           



                                                  the            



                                                  evening.           

 

     Nitrofurant      0      Yes       [id number] 100mg      Take 1         

  [facility]



     oin&Nit.      9-16                               capsule by           ity o

f



     [facility]      00:00:                               mouth in           [place]

as



     (MACROBID)      00                                 the            Medical



     100 mg                                         morning           [facility]



     capsule                                         and 1           



                                                  capsule in           



                                                  the            



                                                  evening.           

 

     Nitrofurant      -0      Yes       [id number] 100mg      Take 1         

  [facility]



     oin&Nit.      9-16                               capsule by           ity o

f



     [facility]      00:00:                               mouth in           [place]

as



     (MACROBID)      00                                 the            Medical



     100 mg                                         morning           [facility]



     capsule                                         and 1           



                                                  capsule in           



                                                  the            



                                                  evening.           

 

     Nitrofurant      0      Yes       [id number] 100mg      Take 1         

  [facility]



     oin&Nit.      9-16                               capsule by           ity o

f



     [facility]      00:00:                               mouth in           [place]

as



     (MACROBID)      00                                 the            Medical



     100 mg                                         morning           [facility]



     capsule                                         and 1           



                                                  capsule in           



                                                  the            



                                                  evening.           

 

     Nitrofurant      0      Yes       [id number] 100mg      Take 1         

  [facility]



     oin&Nit.      9-16                               capsule by           ity o

f



     [facility]      00:00:                               mouth in           [place]

as



     (MACROBID)      00                                 the            Medical



     100 mg                                         morning           [facility]



     capsule                                         and 1           



                                                  capsule in           



                                                  the            



                                                  evening.           

 

     Nitrofurant      0      Yes       [id number] 100mg      Take 1         

  [facility]



     oin&Nit.      9-16                               capsule by           ity o

f



     [facility]      00:00:                               mouth in           [place]

as



     (MACROBID)      00                                 the            Medical



     100 mg                                         morning           [facility]



     capsule                                         and 1           



                                                  capsule in           



                                                  the            



                                                  evening.           

 

     Nitrofurant      0      Yes       [id number] 100mg      Take 1         

  [facility]



     oin&Nit.      9-16                               capsule by           ity o

f



     [facility]      00:00:                               mouth in           [place]

as



     (MACROBID)      00                                 the            Medical



     100 mg                                         morning           [facility]



     capsule                                         and 1           



                                                  capsule in           



                                                  the            



                                                  evening.           

 

     Nitrofurant      0      Yes       [id number] 100mg      Take 1         

  [facility]



     oin&Nit.      9-16                               capsule by           ity o

f



     [facility]      00:00:                               mouth in           [place]

as



     (MACROBID)      00                                 the            Medical



     100 mg                                         morning           [facility]



     capsule                                         and 1           



                                                  capsule in           



                                                  the            



                                                  evening.           

 

     Nitrofurant      0      Yes       [id number] 100mg      Take 1         

  [facility]



     oin&Nit.      9-16                               capsule by           ity o

f



     [facility]      00:00:                               mouth in           [place]

as



     (MACROBID)      00                                 the            Medical



     100 mg                                         morning           [facility]



     capsule                                         and 1           



                                                  capsule in           



                                                  the            



                                                  evening.           

 

     Nitrofurant      -0      Yes       [id number] 100mg      Take 1         

  [facility]



     oin&Nit.      9-16                               capsule by           ity o

f



     [facility]      00:00:                               mouth in           [place]

as



     (MACROBID)      00                                 the            Medical



     100 mg                                         morning           [facility]



     capsule                                         and 1           



                                                  capsule in           



                                                  the            



                                                  evening.           

 

     Nitrofurant      0      Yes       [id number] 100mg      Take 1         

  [facility]



     oin&Nit.      9-16                               capsule by           ity o

f



     [facility]      00:00:                               mouth in           [place]

as



     (MACROBID)      00                                 the            Medical



     100 mg                                         morning           [facility]



     capsule                                         and 1           



                                                  capsule in           



                                                  the            



                                                  evening.           

 

     Nitrofurant      0      Yes       [id number] 100mg      Take 1         

  [facility]



     oin&Nit.      9-16                               capsule by           ity o

f



     [facility]      00:00:                               mouth in           [place]

as



     (MACROBID)      00                                 the            Medical



     100 mg                                         morning           [facility]



     capsule                                         and 1           



                                                  capsule in           



                                                  the            



                                                  evening.           

 

     Nitrofurant      0      Yes       [id number] 100mg      Take 1         

  [facility]



     oin&Nit.      9-16                               capsule by           ity o

f



     [facility]      00:00:                               mouth in           [place]

as



     (MACROBID)      00                                 the            Medical



     100 mg                                         morning           [facility]



     capsule                                         and 1           



                                                  capsule in           



                                                  the            



                                                  evening.           

 

     Nitrofurant            Yes       [id number] 100mg      Take 1         

  [facility]



     oin&Nit.      9-16                               capsule by           ity o

f



     [facility]      00:00:                               mouth in           [place]

as



     (MACROBID)      00                                 the            Medical



     100 mg                                         morning           [facility]



     capsule                                         and 1           



                                                  capsule in           



                                                  the            



                                                  evening.           

 

     Nitrofurant            Yes       [id number] 100mg      Take 1         

  [facility]



     oin&Nit.      9-16                               capsule by           ity o

f



     [facility]      00:00:                               mouth in           [place]

as



     (MACROBID)      00                                 the            Medical



     100 mg                                         morning           [facility]



     capsule                                         and 1           



                                                  capsule in           



                                                  the            



                                                  evening.           

 

     Nitrofurant            Yes       [id number] 100mg      Take 1         

  [facility]



     oin&Nit.      9-16                               capsule by           ity o

f



     [facility]      00:00:                               mouth in           [place]

as



     (MACROBID)      00                                 the            Medical



     100 mg                                         morning           [facility]



     capsule                                         and 1           



                                                  capsule in           



                                                  the            



                                                  evening.           

 

     Nitrofurant      0      Yes       [id number] 100mg      Take 1         

  [facility]



     oin&Nit.      9-16                               capsule by           ity o

f



     [facility]      00:00:                               mouth in           [place]

as



     (MACROBID)      00                                 the            Medical



     100 mg                                         morning           [facility]



     capsule                                         and 1           



                                                  capsule in           



                                                  the            



                                                  evening.           

 

     Nitrofurant      0      Yes       [id number] 100mg      Take 1         

  [facility]



     oin&Nit.      9-16                               capsule by           ity o

f



     [facility]      00:00:                               mouth in           [place]

as



     (MACROBID)      00                                 the            Medical



     100 mg                                         morning           [facility]



     capsule                                         and 1           



                                                  capsule in           



                                                  the            



                                                  evening.           

 

     Nitrofurant      0      Yes       [id number] 100mg      Take 1         

  [facility]



     oin&Nit.      9-16                               capsule by           ity o

f



     [facility]      00:00:                               mouth in           [place]

as



     (MACROBID)      00                                 the            Medical



     100 mg                                         morning           [facility]



     capsule                                         and 1           



                                                  capsule in           



                                                  the            



                                                  evening.           

 

     Nitrofurant      2-0      Yes       [id number] 100mg      Take 1         

  [facility]



     oin&Nit.      9-16                               capsule by           ity o

f



     [facility]      00:00:                               mouth in           [place]

as



     (MACROBID)      00                                 the            Medical



     100 mg                                         morning           [facility]



     capsule                                         and 1           



                                                  capsule in           



                                                  the            



                                                  evening.           

 

     Nitrofurant      -0 2023- No        [id number] 100mg      Take 1        

   [facility]



     oin&Nit.      9-16 03-01                          capsule by           ity 

of



     [facility]      00:00: 00:00                          mouth in           [place]



     (MACROBID)      00   :00                           the            Medical



     100 mg                                         morning           [facility]



     capsule                                         and 1           



                                                  capsule in           



                                                  the            



                                                  evening.           

 

     SERTraline      2-0      Yes            200mg      Take 200           [facility]

[facility]



     100 mg      8-13                               mg by           ity of



     tablet      00:00:                               mouth in           [place]



               00                                 the            Medical



                                                  morning.           [facility]

 

     ARIPiprazol      2-0      Yes            7.5mg      Take 7.5           [facility]

[facility]



     e 15 mg      8-13                               mg by           ity of



     tablet      00:00:                               mouth in           [place]



               00                                 the            [facility]



                                                  morning.           [facility]

 

     SERTraline      2-0      Yes            200mg      Take 200           Un

[facility]



     100 mg      8-13                               mg by           ity of



     tablet      00:00:                               mouth in           [place]



               00                                 the            Medical



                                                  morning.           [facility]

 

     ARIPiprazol      2-0      Yes            7.5mg      Take 7.5           U

nivers



     e 15 mg      8-13                               mg by           ity of



     tablet      00:00:                               mouth in           [place]



               00                                 the            Medical



                                                  morning.           [facility]

 

     SERTraline      2022-0      Yes            200mg      Take 200           Un

[facility]



     100 mg      8-13                               mg by           ity of



     tablet      00:00:                               mouth in           [place]



               00                                 the            Medical



                                                  morning.           [facility]

 

     ARIPiprazol      2022-0      Yes            7.5mg      Take 7.5           U

nivers



     e 15 mg      8-13                               mg by           ity of



     tablet      00:00:                               mouth in           [place]



               00                                 the            Medical



                                                  morning.           [facility]

 

     SERTraline      2022-0      Yes            200mg      Take 200           Un

[facility]



     100 mg      8-13                               mg by           ity of



     tablet      00:00:                               mouth in           [place]



               00                                 the            Medical



                                                  morning.           [facility]

 

     ARIPiprazol      2022-0      Yes            7.5mg      Take 7.5           U

nivers



     e 15 mg      8-13                               mg by           ity of



     tablet      00:00:                               mouth in           [place]



               00                                 the            Medical



                                                  morning.           [facility]

 

     SERTraline      2022-0      Yes            200mg      Take 200           Un

[facility]



     100 mg      8-13                               mg by           ity of



     tablet      00:00:                               mouth in           [place]



               00                                 the            Medical



                                                  morning.           [facility]

 

     ARIPiprazol      2022-0      Yes            7.5mg      Take 7.5           U

nivers



     e 15 mg      8-13                               mg by           ity of



     tablet      00:00:                               mouth in           [place]



               00                                 the            Medical



                                                  morning.           [facility]

 

     SERTraline      2022-0      Yes            200mg      Take 200           Un

[facility]



     100 mg      8-13                               mg by           ity of



     tablet      00:00:                               mouth in           [place]



               00                                 the            Medical



                                                  morning.           [facility]

 

     ARIPiprazol      2022-0      Yes            7.5mg      Take 7.5           U

nivers



     e 15 mg      8-13                               mg by           ity of



     tablet      00:00:                               mouth in           [place]



                                                the            Medical



                                                  morning.           [facility]

 

     SERTraline      2022-0      Yes            200mg      Take 200           Un

[facility]



     100 mg      8-13                               mg by           ity of



     tablet      00:00:                               mouth in           [place]



                                                the            Medical



                                                  morning.           [facility]

 

     ARIPiprazol      2022-0      Yes            7.5mg      Take 7.5           U

nivers



     e 15 mg      8-13                               mg by           ity of



     tablet      00:00:                               mouth in           [place]



                                                the            Medical



                                                  morning.           [facility]

 

     SERTraline      2022-0      Yes            200mg      Take 200           Un

[facility]



     100 mg      8-13                               mg by           ity of



     tablet      00:00:                               mouth in           [place]



                                                the            Medical



                                                  morning.           [facility]

 

     ARIPiprazol      2022-0      Yes            7.5mg      Take 7.5           U

nivers



     e 15 mg      8-13                               mg by           ity of



     tablet      00:00:                               mouth in           [place]



               00                                 the            Medical



                                                  morning.           [facility]

 

     SERTraline      2022-0      Yes            200mg      Take 200           Un

[facility]



     100 mg      8-13                               mg by           ity of



     tablet      00:00:                               mouth in           [place]



               00                                 the            Medical



                                                  morning.           [facility]

 

     ARIPiprazol      2022-0      Yes            7.5mg      Take 7.5           U

nivers



     e 15 mg      8-13                               mg by           ity of



     tablet      00:00:                               mouth in           [place]



               00                                 the            Medical



                                                  morning.           [facility]

 

     SERTraline      2022-0      Yes            200mg      Take 200           Un

[facility]



     100 mg      8-13                               mg by           ity of



     tablet      00:00:                               mouth in           [place]



               00                                 the            Medical



                                                  morning.           [facility]

 

     ARIPiprazol      2022-0      Yes            7.5mg      Take 7.5           U

nivers



     e 15 mg      8-13                               mg by           ity of



     tablet      00:00:                               mouth in           [place]



                                                the            Medical



                                                  morning.           [facility]

 

     SERTraline      2022-0      Yes            200mg      Take 200           Un

[facility]



     100 mg      8-13                               mg by           ity of



     tablet      00:00:                               mouth in           [place]



                                                the            Medical



                                                  morning.           [facility]

 

     ARIPiprazol      2022-0      Yes            7.5mg      Take 7.5           U

nivers



     e 15 mg      8-13                               mg by           ity of



     tablet      00:00:                               mouth in           [place]



                                                the            Medical



                                                  morning.           [facility]

 

     SERTraline      2022-0      Yes            200mg      Take 200           Un

[facility]



     100 mg      8-13                               mg by           ity of



     tablet      00:00:                               mouth in           [place]



                                                the            Medical



                                                  morning.           [facility]

 

     ARIPiprazol      2022-0      Yes            7.5mg      Take 7.5           U

nivers



     e 15 mg      8-13                               mg by           ity of



     tablet      00:00:                               mouth in           [place]



                                                the            Medical



                                                  morning.           [facility]

 

     SERTraline      2022-0      Yes            200mg      Take 200           Un

[facility]



     100 mg      8-13                               mg by           ity of



     tablet      00:00:                               mouth in           [place]



                                                the            Medical



                                                  morning.           [facility]

 

     ARIPiprazol      2022-0      Yes            7.5mg      Take 7.5           U

nivers



     e 15 mg      8-13                               mg by           ity of



     tablet      00:00:                               mouth in           [place]



                                                the            Medical



                                                  morning.           [facility]

 

     SERTraline      2022-0      Yes            200mg      Take 200           Un

[facility]



     100 mg      8-13                               mg by           ity of



     tablet      00:00:                               mouth in           [place]



                                                the            Medical



                                                  morning.           [facility]

 

     ARIPiprazol      2022-0      Yes            7.5mg      Take 7.5           U

nivers



     e 15 mg      8-13                               mg by           ity of



     tablet      00:00:                               mouth in           [place]



                                                the            Medical



                                                  morning.           [facility]

 

     SERTraline      2022-0      Yes            200mg      Take 200           Un

[facility]



     100 mg      8-13                               mg by           ity of



     tablet      00:00:                               mouth in           [place]



                                                the            Medical



                                                  morning.           [facility]

 

     SERTraline      2022-0      Yes            200mg      Take 200           Un

[facility]



     100 mg      8-13                               mg by           ity of



     tablet      00:00:                               mouth in           [place]



                                                the            Medical



                                                  morning.           [facility]

 

     SERTraline      2022-0      Yes            200mg      Take 200           Un

[facility]



     100 mg      8-13                               mg by           ity of



     tablet      00:00:                               mouth in           [place]



                                                the            Medical



                                                  morning.           [facility]

 

     SERTraline      2022-0      Yes            200mg      Take 200           Un

[facility]



     100 mg      8-13                               mg by           ity of



     tablet      00:00:                               mouth in           [place]



                                                the            Medical



                                                  morning.           [facility]

 

     SERTraline      2022-0      Yes            200mg      Take 200           Un

[facility]



     100 mg      8-13                               mg by           ity of



     tablet      00:00:                               mouth in           [place]



                                                the            Medical



                                                  morning.           [facility]

 

     SERTraline      2022-0      Yes            200mg      Take 200           Un

[facility]



     100 mg      8-13                               mg by           ity of



     tablet      00:00:                               mouth in           [place]



                                                the            Medical



                                                  morning.           [facility]

 

     SERTraline      2022-0      Yes            200mg      Take 200           Un

[facility]



     100 mg      8-13                               mg by           ity of



     tablet      00:00:                               mouth in           [place]



                                                the            Medical



                                                  morning.           [facility]

 

     SERTraline      2022-0      Yes            200mg      Take 200           Un

[facility]



     100 mg      8-13                               mg by           ity of



     tablet      00:00:                               mouth in           [place]



                                                the            Medical



                                                  morning.           [facility]

 

     SERTraline      2022-0      Yes            200mg      Take 200           Un

[facility]



     100 mg      8-13                               mg by           ity of



     tablet      00:00:                               mouth in           [place]



                                                the            Medical



                                                  morning.           [facility]

 

     SERTraline      2022-0      Yes            200mg      Take 200           Un

[facility]



     100 mg      8-13                               mg by           ity of



     tablet      00:00:                               mouth in           [place]



                                                the            Medical



                                                  morning.           [facility]

 

     SERTraline      2022-0      Yes            200mg      Take 200           Un

[facility]



     100 mg      8-13                               mg by           ity of



     tablet      00:00:                               mouth in           [place]



                                                the            Medical



                                                  morning.           [facility]

 

     SERTraline      2022-0      Yes            200mg      Take 200           Un

[facility]



     100 mg      8-13                               mg by           ity of



     tablet      00:00:                               mouth in           [place]



                                                the            Medical



                                                  morning.           [facility]

 

     SERTraline      2022-0      Yes            200mg      Take 2           [facility]

ers



     100 mg      8-13                               tablets by           ity of



     tablet      00:00:                               mouth in           [place]



                                                the            Medical



                                                  morning.           [facility]

 

     SERTraline      2022-0      Yes            200mg      Take 2           [facility]

ers



     100 mg      8-13                               tablets by           ity of



     tablet      00:00:                               mouth in           [place]



                                                the            Medical



                                                  morning.           [facility]

 

     SERTraline      2022-0      Yes            200mg      Take 2           [facility]

ers



     100 mg      8-13                               tablets by           ity of



     tablet      00:00:                               mouth in           [place]



                                                the            Medical



                                                  morning.           [facility]

 

     SERTraline      2022-0      Yes            200mg      Take 2           [facility]

ers



     100 mg      8-13                               tablets by           ity of



     tablet      00:00:                               mouth in           [place]



                                                the            Medical



                                                  morning.           [facility]

 

     SERTraline      2022-0      Yes            200mg      Take 2           [facility]

ers



     100 mg      8-13                               tablets by           ity of



     tablet      00:00:                               mouth in           [place]



                                                the            Medical



                                                  morning.           [facility]

 

     SERTraline      2022-0      Yes            200mg      Take 2           [facility]

ers



     100 mg      8-13                               tablets by           ity of



     tablet      00:00:                               mouth in           [place]



                                                the            Medical



                                                  morning.           [facility]

 

     SERTraline      2022-0      Yes            200mg      Take 2           [facility]

ers



     100 mg      8-13                               tablets by           ity of



     tablet      00:00:                               mouth in           [place]



                                                the            Medical



                                                  morning.           [facility]

 

     SERTraline      2022-0      Yes            200mg      Take 2           [facility]

ers



     100 mg      8-13                               tablets by           ity of



     tablet      00:00:                               mouth in           [place]



                                                the            Medical



                                                  morning.           [facility]

 

     SERTraline      2022-0      Yes            200mg      Take 2           [facility]

ers



     100 mg      8-13                               tablets by           ity of



     tablet      00:00:                               mouth in           [place]



                                                the            Medical



                                                  morning.           [facility]

 

     SERTraline      2022-0      Yes            200mg      Take 2           [facility]

ers



     100 mg      8-13                               tablets by           ity of



     tablet      00:00:                               mouth in           [place]



                                                the            Medical



                                                  morning.           [facility]

 

     SERTraline      2022-0      Yes            200mg      Take 2           [facility]

ers



     100 mg      8-13                               tablets by           ity of



     tablet      00:00:                               mouth in           [place]



                                                the            Medical



                                                  morning.           [facility]

 

     SERTraline      2022-0      Yes            200mg      Take 2           [facility]

ers



     100 mg      8-13                               tablets by           ity of



     tablet      00:00:                               mouth in           [place]



                                                the            Medical



                                                  morning.           [facility]

 

     SERTraline      2022-0      Yes            200mg      Take 2           [facility]

ers



     100 mg      8-13                               tablets by           ity of



     tablet      00:00:                               mouth in           [place]



                                                the            Medical



                                                  morning.           [facility]

 

     SERTraline      2022-0      Yes            200mg      Take 2           [facility]

ers



     100 mg      8-13                               tablets by           ity of



     tablet      00:00:                               mouth in           [place]



                                                the            Medical



                                                  morning.           [facility]

 

     SERTraline      2022-0      Yes            200mg      Take 2           [facility]

ers



     100 mg      8-13                               tablets by           ity of



     tablet      00:00:                               mouth in           [place]



                                                the            Medical



                                                  morning.           [facility]

 

     SERTraline      2022-0      Yes            200mg      Take 2           [facility]

ers



     100 mg      8-13                               tablets by           ity of



     tablet      00:00:                               mouth in           [place]



                                                the            Medical



                                                  morning.           [facility]

 

     SERTraline      2022-0      Yes            200mg      Take 2           [facility]

ers



     100 mg      8-13                               tablets by           ity of



     tablet      00:00:                               mouth in           [place]



                                                the            Medical



                                                  morning.           [facility]

 

     SERTraline      2022-0      Yes            200mg      Take 2           [facility]

ers



     100 mg      8-13                               tablets by           ity of



     tablet      00:00:                               mouth in           [place]



               00                                 the            Medical



                                                  morning.           [facility]

 

     SERTraline      2022-0      Yes            200mg      Take 2           [facility]

ers



     100 mg      8-13                               tablets by           ity of



     tablet      00:00:                               mouth in           [place]



               00                                 the            Medical



                                                  morning.           [facility]

 

     SERTraline      2022-0      Yes            200mg      Take 2           [facility]

ers



     100 mg      8-13                               tablets by           ity of



     tablet      00:00:                               mouth in           [place]



               00                                 the            Medical



                                                  morning.           [facility]

 

     SERTraline      2022-0      Yes            200mg      Take 2           [facility]

ers



     100 mg      8-13                               tablets by           ity of



     tablet      00:00:                               mouth in           [place]



               00                                 the            Medical



                                                  morning.           [facility]

 

     SERTraline      2022-0      Yes            200mg      Take 2           [facility]

ers



     100 mg      8-13                               tablets by           ity of



     tablet      00:00:                               mouth in           [place]



                                                the            Medical



                                                  morning.           [facility]

 

     SERTraline      2022-0      Yes            200mg      Take 2           [facility]

[facility]



     100 mg      8-13                               tablets by           [facility]



     tablet      00:00:                               mouth in           [place]



                                                the            Medical



                                                  morning.           [facility]

 

     SERTraline      2022-0      Yes            200mg      Take 2           [facility]

[facility]



     100 mg      8-13                               tablets by           [facility]



     tablet      00:00:                               mouth in           [place]



                                                the            Medical



                                                  morning.           [facility]

 

     SERTraline      2022-0      Yes            200mg      Take 2           [facility]

[facility]



     100 mg      8-13                               tablets by           [facility]



     tablet      00:00:                               mouth in           [place]



                                                the            Medical



                                                  morning.           [facility]

 

     SERTraline      2022-0      Yes            200mg      Take 2           [facility]

[facility]



     100 mg      8-13                               tablets by           [facility] of



     tablet      00:00:                               mouth in           [place]



                                                the            Medical



                                                  morning.           [facility]

 

     SERTraline      2022-0      Yes            200mg      Take 2           [facility]

[facility]



     100 mg      8-13                               tablets by           [facility]



     tablet      00:00:                               mouth in           [place]



                                                the            Medical



                                                  morning.           [facility]

 

     SERTraline      2022-0      Yes            200mg      Take 2           [facility]

[facility]



     100 mg      8-13                               tablets by           [facility]



     tablet      00:00:                               mouth in           [place]



                                                the            Medical



                                                  morning.           [facility]

 

     SERTraline      2022-0      Yes            200mg      Take 2           [facility]

[facility]



     100 mg      8-13                               tablets by           [facility] of



     tablet      00:00:                               mouth in           [place]



               00                                 the            Medical



                                                  morning.           [facility]

 

     SERTraline      2022-0      Yes            200mg      Take 200           [facility]

[facility]



     100 mg      8-13                               mg by           ity of



     tablet      00:00:                               mouth in           [place]



               00                                 the            Medical



                                                  morning.           [facility]

 

     ARIPiprazol      2022-0      Yes            7.5mg      Take 7.5           [facility]

[facility]



     e 15 mg      8-13                               mg by           ity of



     tablet      00:00:                               mouth in           [place]



               00                                 the            Medical



                                                  morning.           [facility]

 

     SERTraline      2022-0      Yes            200mg      Take 200           [facility]

[facility]



     100 mg      8-13                               mg by           ity of



     tablet      00:00:                               mouth in           [place]



                                                the            Medical



                                                  morning.           [facility]

 

     ARIPiprazol      2022-0      Yes            7.5mg      Take 7.5           [facility]

[facility]



     e 15 mg      8-13                               mg by           ity of



     tablet      00:00:                               mouth in           [place]



                                                the            Medical



                                                  morning.           [facility]

 

     SERTraline      2022-0      Yes            200mg      Take 200           [facility]

[facility]



     100 mg      8-13                               mg by           ity of



     tablet      00:00:                               mouth in           [place]



                                                the            Medical



                                                  morning.           [facility]

 

     ARIPiprazol      2022-0      Yes            7.5mg      Take 7.5           [facility]

[facility]



     e 15 mg      8-13                               mg by           [facility] of



     tablet      00:00:                               mouth in           [place]



                                                the            Medical



                                                  morning.           [facility]

 

     SERTraline      2022-0      Yes            200mg      Take 200           [facility]

[facility]



     100 mg      8-13                               mg by           ity of



     tablet      00:00:                               mouth in           [place]



                                                the            Medical



                                                  morning.           [facility]

 

     ARIPiprazol      2022-0      Yes            7.5mg      Take 7.5           [facility]

[facility]



     e 15 mg      8-13                               mg by           [facility] of



     tablet      00:00:                               mouth in           [place]



                                                the            Medical



                                                  morning.           [facility]

 

     SERTraline      2022-0      Yes            200mg      Take 200           [facility]

[facility]



     100 mg      8-13                               mg by           [facility] of



     tablet      00:00:                               mouth in           [place]



                                                the            Medical



                                                  morning.           [facility]

 

     ARIPiprazol      2022-0      Yes            7.5mg      Take 7.5           [facility]

[facility]



     e 15 mg      8-13                               mg by           [facility] of



     tablet      00:00:                               mouth in           [place]



                                                the            Medical



                                                  morning.           [facility]

 

     SERTraline      2022-0      Yes            200mg      Take 200           [facility]

[facility]



     100 mg      8-13                               mg by           [facility] of



     tablet      00:00:                               mouth in           [place]



                                                the            Medical



                                                  morning.           [facility]

 

     ARIPiprazol      2022-0      Yes            7.5mg      Take 7.5           [facility]

[facility]



     e 15 mg      8-13                               mg by           [facility] of



     tablet      00:00:                               mouth in           [place]



               00                                 the            Medical



                                                  morning.           [facility]

 

     SERTraline      2022-0      Yes            200mg      Take 200           [facility]

[facility]



     100 mg      8-13                               mg by           [facility] of



     tablet      00:00:                               mouth in           [place]



               00                                 the            Medical



                                                  morning.           [facility]

 

     ARIPiprazol      2022-0      Yes            7.5mg      Take 7.5           [facility]

[facility]



     e 15 mg      8-13                               mg by           [facility] of



     tablet      00:00:                               mouth in           [place]



               00                                 the            Medical



                                                  morning.           [facility]

 

     SERTraline      -0      Yes            200mg      Take 200           [facility]

[facility]



     100 mg      8-13                               mg by           [facility] of



     tablet      00:00:                               mouth in           [place]



               00                                 the            Medical



                                                  morning.           [facility]

 

     ARIPiprazol      -0      Yes            7.5mg      Take 7.5           [facility]

[facility]



     e 15 mg      8-13                               mg by           [facility] of



     tablet      00:00:                               mouth in           [place]



               00                                 the            Medical



                                                  morning.           [facility]

 

     SERTraline      -0      Yes            200mg      Take 200           [facility]

[facility]



     100 mg      8-13                               mg by           [facility] of



     tablet      00:00:                               mouth in           [place]



               00                                 the            Medical



                                                  morning.           [facility]

 

     ARIPiprazol      -0      Yes            7.5mg      Take 7.5           [facility]

[facility]



     e 15 mg      8-13                               mg by           [facility] of



     tablet      00:00:                               mouth in           [place]



               00                                 the            Medical



                                                  morning.           [facility]

 

     SERTraline      -0      Yes            200mg      Take 200           [facility]

[facility]



     100 mg      8-13                               mg by           [facility] of



     tablet      00:00:                               mouth in           [place]



               00                                 the            Medical



                                                  morning.           [facility]

 

     ARIPiprazol      0      Yes            7.5mg      Take 7.5           [facility]

[facility]



     e 15 mg      8-13                               mg by           [facility]



     tablet      00:00:                               mouth in           [place]



               00                                 the            Medical



                                                  morning.           [facility]

 

     prenatal            Yes            1{packe      Take 1           [facility]

[facility]



     vit       8-08                     t}        Packet by           [facility]



     33-iron-fol      00:00:                               [facility]           [place]

[place]



     [facility]      00                                 [facility]            Medical



     (SELECT-OB                                         morning.           [facility]

h



     + DHA) 29                                                        



     mg iron-1                                                        



     mg -250 mg                                                        



     combo pack                                                        

 

     prenatal            Yes            1{packe      Take 1           [facility]

[facility]



     vit       8-08                     t}        Packet by           [facility]



     33-iron-fol      00:00:                               [facility] in           [facility]

[place]



     ic-d[facility]      00                                 the            Medical



     (SELECT-OB                                         morning.           [facility]

[facility]



     + DHA) 29                                                        



     mg iron-1                                                        



     mg -250 mg                                                        



     combo pack                                                        

 

     prenatal            Yes            1{packe      Take 1           [facility]

[facility]



     vit       8-08                     t}        Packet by           [facility]



     33-iron-fol      00:00:                               mouth in           [facility]

[place]



     [facility]-d[facility]      00                                 the            Medical



     (SELECT-OB                                         morning.           [facility]

[facility]



     + DHA) 29                                                        



     mg iron-1                                                        



     mg -250 mg                                                        



     combo pack                                                        

 

     prenatal      0      Yes            1{packe      Take 1           [facility]

[facility]



     vit       8-08                     t}        Packet by           [facility]



     33-iron-fol      00:00:                               mouth in           [facility]

[place]



     [facility]-d[facility]      00                                 the            [facility]



     (SELECT-OB                                         morning.           [facility]

[facility]



     + DHA) 29                                                        



     mg iron-1                                                        



     mg -250 mg                                                        



     combo pack                                                        

 

     prenatal            Yes            1{packe      Take 1           [facility]

[facility]



     vit       8-08                     t}        Packet by           [facility]



     33-iron-fol      00:00:                               mouth in           [facility]

[place]



     ic-d[facility]      00                                 the            Medical



     (SELECT-OB                                         morning.           [facility]

[facility]



     + DHA) 29                                                        



     mg iron-1                                                        



     mg -250 mg                                                        



     combo pack                                                        

 

     prenatal            Yes            1{packe      Take 1           [facility]

[facility]



     vit       8-08                     t}        Packet by           [facility]



     33-iron-fol      00:00:                               mouth in           [facility]

[place]



     [facility]-d[facility]      00                                 the            [facility]



     (SELECT-OB                                         morning.           [facility]

[facility]



     + DHA) 29                                                        



     mg iron-1                                                        



     mg -250 mg                                                        



     combo pack                                                        

 

     prenatal            Yes            1{packe      Take 1           [facility]

[facility]



     vit       8-08                     t}        Packet by           [facility]



     33-iron-fol      00:00:                               mouth in           [facility]

[place]



     ic-d[facility]      00                                 the            [facility]



     (SELECT-OB                                         morning.           [facility]

[facility]



     + DHA) 29                                                        



     mg iron-1                                                        



     mg -250 mg                                                        



     combo pack                                                        

 

     prenatal            Yes            1{packe      Take 1           [facility]

[facility]



     vit       8-08                     t}        Packet by           [facility]



     33-iron-fol      00:00:                               [facility] in           [facility]

[place]



     ic-d[facility]      00                                 the            Medical



     (SELECT-OB                                         morning.           [facility]

[facility]



     + DHA) 29                                                        



     mg iron-1                                                        



     mg -250 mg                                                        



     combo pack                                                        

 

     prenatal            Yes            1{packe      Take 1           [facility]

[facility]



     vit       8-08                     t}        Packet by           [facility]



     33-iron-fol      00:00:                               mouth in           [facility]

[place]



     [facility]-d[facility]      00                                 the            [facility]



     (SELECT-OB                                         morning.           [facility]

[facility]



     + DHA) 29                                                        



     mg iron-1                                                        



     mg -250 mg                                                        



     combo pack                                                        

 

     prenatal            Yes            1{packe      Take 1           [facility]

[facility]



     vit       8-08                     t}        Packet by           [facility]



     33-iron-fol      00:00:                               mouth in           [facility]

[place]



     ic-d[facility]      00                                 the            Medical



     (SELECT-OB                                         morning.           [facility]

[facility]



     + DHA) 29                                                        



     mg iron-1                                                        



     mg -250 mg                                                        



     combo pack                                                        

 

     prenatal            Yes            1{packe      Take 1           [facility]

[facility]



     vit       8-08                     t}        Packet by           [facility]



     33-iron-fol      00:00:                               mouth in           [facility]

[place]



     ic-d[facility]      00                                 the            Medical



     (SELECT-OB                                         morning.           [facility]

[facility]



     + DHA) 29                                                        



     mg iron-1                                                        



     mg -250 mg                                                        



     combo pack                                                        

 

     prenatal            Yes            1{packe      Take 1           [facility]

[facility]



     vit       8-08                     t}        Packet by           [facility]



     33-iron-fol      00:00:                               mouth in           [facility]

[place]



     ic-d[facility]      00                                 the            Medical



     (SELECT-OB                                         morning.           [facility]

[facility]



     + DHA) 29                                                        



     mg iron-1                                                        



     mg -250 mg                                                        



     combo pack                                                        

 

     prenatal            Yes            1{packe      Take 1           [facility]

[facility]



     vit       8-08                     t}        Packet by           [facility]



     33-iron-fol      00:00:                               mouth in           [facility]

[place]



     ic-d[facility]      00                                 the            [facility]



     (SELECT-OB                                         morning.           [facility]

[facility]



     + DHA) 29                                                        



     mg iron-1                                                        



     mg -250 mg                                                        



     combo pack                                                        

 

     prenatal            Yes            1{packe      Take 1           [facility]

[facility]



     vit       8-08                     t}        Packet by           [facility]



     33-iron-fol      00:00:                               mouth in           [facility]

[place]



     ic-d[facility]      00                                 the            Medical



     (SELECT-OB                                         morning.           [facility]

[facility]



     + DHA) 29                                                        



     mg iron-1                                                        



     mg -250 mg                                                        



     combo pack                                                        

 

     prenatal            Yes            1{packe      Take 1           [facility]

[facility]



     vit       8-08                     t}        Packet by           [facility]



     33-iron-fol      00:00:                               mouth in           [facility]

[place]



     ic-d[facility]      00                                 the            Medical



     (SELECT-OB                                         morning.           [facility]

[facility]



     + DHA) 29                                                        



     mg iron-1                                                        



     mg -250 mg                                                        



     combo pack                                                        

 

     prenatal            Yes            1{packe      Take 1           [facility]

[facility]



     vit       8-08                     t}        Packet by           [facility]



     33-iron-fol      00:00:                               mouth in           [facility]

[place]



     ic-d[facility]      00                                 the            Medical



     (SELECT-OB                                         morning.           [facility]

[facility]



     + DHA) 29                                                        



     mg iron-1                                                        



     mg -250 mg                                                        



     combo pack                                                        

 

     prenatal            Yes            1{packe      Take 1           [facility]

[facility]



     vit       8-08                     t}        Packet by           [facility]



     33-iron-fol      00:00:                               mouth in           [facility]

[place]



     ic-d[facility]      00                                 the            Medical



     (SELECT-OB                                         morning.           [facility]

[facility]



     + DHA) 29                                                        



     mg iron-1                                                        



     mg -250 mg                                                        



     combo pack                                                        

 

     prenatal            Yes            1{packe      Take 1           [facility]

[facility]



     vit       8-08                     t}        Packet by           [facility]



     33-iron-fol      00:00:                               mouth in           [facility]

[place]



     ic-d[facility]      00                                 the            Medical



     (SELECT-OB                                         morning.           [facility]

[facility]



     + DHA) 29                                                        



     mg iron-1                                                        



     mg -250 mg                                                        



     combo pack                                                        

 

     prenatal            Yes            1{packe      Take 1           [facility]

[facility]



     vit       8-08                     t}        Packet by           [facility]



     33-iron-fol      00:00:                               mouth in           [facility]

[place]



     ic-dha      00                                 the            Medical



     (SELECT-OB                                         morning.           [facility]

[facility]



     + DHA) 29                                                        



     mg iron-1                                                        



     mg -250 mg                                                        



     combo pack                                                        

 

     prenatal            Yes            1{packe      Take 1           [facility]

[facility]



     vit       8-08                     t}        Packet by           [facility]



     33-iron-fol      00:00:                               mouth in           [facility]

[place]



     ic-dha      00                                 the            [facility]



     (SELECT-OB                                         morning.           [facility]

[facility]



     + DHA) 29                                                        



     mg iron-1                                                        



     mg -250 mg                                                        



     combo pack                                                        

 

     prenatal            Yes            1{packe      Take 1           [facility]

[facility]



     vit       8-08                     t}        Packet by           [facility]



     33-iron-fol      00:00:                               mouth in           [facility]

[place]



     ic-dha      00                                 the            [facility]



     (SELECT-OB                                         morning.           [facility]

[facility]



     + DHA) 29                                                        



     mg iron-1                                                        



     mg -250 mg                                                        



     combo pack                                                        

 

     prenatal            Yes            1{packe      Take 1           [facility]

[facility]



     vit       8-08                     t}        Packet by           [facility]



     33-iron-fol      00:00:                               mouth in           [facility]

[place]



     ic-dha      00                                 the            [facility]



     (SELECT-OB                                         morning.           [facility]

[facility]



     + DHA) 29                                                        



     mg iron-1                                                        



     mg -250 mg                                                        



     combo pack                                                        

 

     prenatal            Yes            1{packe      Take 1           [facility]

[facility]



     vit       8-08                     t}        Packet by           [facility]



     33-iron-fol      00:00:                               mouth in           [facility]

[place]



     ic-dha      00                                 the            Medical



     (SELECT-OB                                         morning.           [facility]

[facility]



     + DHA) 29                                                        



     mg iron-1                                                        



     mg -250 mg                                                        



     combo pack                                                        

 

     prenatal            Yes            1{packe      Take 1           [facility]

[facility]



     vit       8-08                     t}        Packet by           [facility]



     33-iron-fol      00:00:                               mouth in           [facility]

[place]



     ic-dha      00                                 the            Medical



     (SELECT-OB                                         morning.           [facility]

[facility]



     + DHA) 29                                                        



     mg iron-1                                                        



     mg -250 mg                                                        



     combo pack                                                        

 

     prenatal            Yes            1{packe      Take 1           [facility]

[facility]



     vit       8-08                     t}        Packet by           [facility]



     33-iron-fol      00:00:                               mouth in           [facility]

[place]



     ic-dha      00                                 the            [facility]



     (SELECT-OB                                         morning.           [facility]

[facility]



     + DHA) 29                                                        



     mg iron-1                                                        



     mg -250 mg                                                        



     combo pack                                                        

 

     prenatal            Yes            1{packe      Take 1           [facility]

[facility]



     vit       8-08                     t}        Packet by           [facility]



     33-iron-fol      00:00:                               mouth in           [facility]

[place]



     ic-dha      00                                 the            [facility]



     (SELECT-OB                                         morning.           [facility]

[facility]



     + DHA) 29                                                        



     mg iron-1                                                        



     mg -250 mg                                                        



     combo pack                                                        

 

     prenatal            Yes            1{packe      Take 1           [facility]

[facility]



     vit       8-08                     t}        Packet by           [facility]



     33-iron-fol      00:00:                               mouth in           [facility]

[place]



     ic-dha      00                                 the            [facility]



     (SELECT-OB                                         morning.           [facility]

[facility]



     + DHA) 29                                                        



     mg iron-1                                                        



     mg -250 mg                                                        



     combo pack                                                        

 

     prenatal            Yes            1{packe      Take 1           [facility]

[facility]



     vit       8-08                     t}        Packet by           [facility]



     33-iron-fol      00:00:                               mouth in           [facility]

[place]



     ic-dha      00                                 the            [facility]



     (SELECT-OB                                         morning.           [facility]

[facility]



     + DHA) 29                                                        



     mg iron-1                                                        



     mg -250 mg                                                        



     combo pack                                                        

 

     prenatal            Yes            1{packe      Take 1           [facility]

[facility]



     vit       8-08                     t}        Packet by           [facility]



     33-iron-fol      00:00:                               mouth in           [facility]

[place]



     ic-dha      00                                 the            [facility]



     (SELECT-OB                                         morning.           [facility]

[facility]



     + DHA) 29                                                        



     mg iron-1                                                        



     mg -250 mg                                                        



     combo pack                                                        

 

     prenatal      2023- No             1{packe      Take 1           [facility]

[facility]



     vit       8-08 01-04                t}        Packet by           [facility]



     33-iron-fol      00:00: 00:00                          mouth in           [facility]

[place]



     [facility]-d[facility]      00   :00                           the            [facility]



     (SELECT-OB                                         morning.           [facility]

[facility]



     + DHA) 29                                                        



     mg iron-1                                                        



     mg -250 mg                                                        



     combo pack                                                        

 

     prenatal      2023- No             1{packe      Take 1           [facility]

[facility]



     vit       8-08 01-04                t}        Packet by           [facility]



     33-iron-fol      00:00: 00:00                          mouth in           [facility]

[facility]



     ic-dha      00   :00                           the            [facility]



     (SELECT-OB                                         morning.           [facility]

[facility]



     + DHA) 29                                                        



     mg iron-1                                                        



     mg -250 mg                                                        



     combo pack                                                        

 

     prenatal      2023- No             1{packe      Take 1           [facility]

[facility]



     vit       8-08 01-04                t}        Packet by           [facility]



     33-iron-fol      00:00: 00:00                          mouth in           [facility]

[facility]



     ic-dha      00   :00                           the            [facility]



     (SELECT-OB                                         morning.           [facility]

[facility]



     + DHA) 29                                                        



     mg iron-1                                                        



     mg -250 mg                                                        



     combo pack                                                        

 

     ampicillin      2022- No        [id number] 500mg      Take 1         

  [facility]



     500 mg                                capsule by           [facility]



     capsule      00:00: 04:59                          mouth 4           [place]



               00   :00                           (four)           [facility]



                                                  times           [facility]



                                                  daily for           



                                                  10 days.           

 

     ampicillin      2022- No        [id number] 500mg      Take 1         

  [facility]



     500 mg                                capsule by           [facility]



     capsule      00:00: 04:59                          mouth 4           [place]



               00   :00                           (four)           [facility]



                                                  times           [facility]



                                                  daily for           



                                                  10 days.           

 

     sertraline      2021      Yes            100mg QD   Take 100           [facility]



     (ZOLOFT)      1-03                               mg by           [facility]



     100 MG      11:58:                               mouth           [facility]



     tablet      36                                 daily.           [place]

 

     sertraline      2021      Yes            100mg QD   Take 100           [facility]



     (ZOLOFT)      1-03                               mg by           [facility]



     100 MG      11:58:                               mouth           [facility]



     tablet      36                                 daily.           [place]

 

     ARIPiprazol      2021      Yes            15mg QD   Take 15 mg           

[facility]



     e (ABILIFY)      1-03                               by mouth           [facility]

[facility]



     15 MG      11:58:                               daily.           [facility]



     tablet      36                                                [place]

 

     ARIPiprazol      2021      Yes            15mg QD   Take 15 mg           

[facility]



     e (ABILIFY)      1-03                               by mouth           [facility]

[facility]



     15 MG      11:58:                               daily.           [facility]



     tablet      36                                                [place]

 

     busPIRone      2021      Yes            10mg [id number] Take 10 mg       

    [facility]



     (BUSPAR) 10                                [id number] by mouth 3       

    [facility]



     MG tablet      11:58:                          3D   (three)           [facility]

al



               36                                 times           Center



                                                  daily.           

 

     norethindro      2021      Yes            1{tbl} QD   Take 1           [facility]



     ne        1-                               tablet by           [facility]



     (MICRONOR)      11:58:                               mouth           [facility]

l



     0.35 mg per      36                                 daily.           Center



     tablet                                                        

 

     ondansetron      2021      Yes            4mg       Take 4 mg           [facility]

[facility]



     (ZOFRAN) 4      03                               by mouth 2           [facility]

[facility]



     MG tablet      11:58:                               (two)           [facility]



               36                                 times           Center



                                                  daily as           



                                                  needed for           



                                                  Nausea.           

 

     busPIRone      2021      Yes            10mg Q.[id number] Take 10 mg       

    [facility]



     (BUSPAR) 10      -                          [id number] by mouth 3       

    [facility]



     MG tablet      11:58:                          3D   (three)           [facility]

al



               36                                 times           Center



                                                  daily.           

 

     norethindro      2021      Yes            1{tbl} QD   Take 1           [facility]



     ne        1-03                               tablet by           [facility]



     (MICRONOR)      11:58:                               mouth           [facility]

l



     0.35 mg per      36                                 daily.           Center



     tablet                                                        

 

     ondansetron      2021      Yes            4mg       Take 4 mg           [facility]

[facility]



     (ZOFRAN) 4      1-03                               by mouth 2           [facility]

[facility]



     MG tablet      11:58:                               (two)           Medical



               36                                 times           Center



                                                  daily as           



                                                  needed for           



                                                  Nausea.           

 

     sertraline      2021      Yes            100mg QD   Take 100           [facility]



     (ZOLOFT)      1-03                               mg by           [facility]



     100 MG      11:58:                               mouth           [facility]



     tablet      36                                 daily.           Center

 

     ARIPiprazol      2021      Yes            15mg QD   Take 15 mg           

[facility]



     e (ABILIFY)      1-03                               by mouth           [facility]

[facility]



     15 MG      11:58:                               daily.           [facility]



     tablet      36                                                [facility]

 

     busPIRone      2021      Yes            10mg Q.[id number] Take 10 mg       

    [facility]



     (BUSPAR) 10      03                          [id number] by mouth 3       

    [facility]



     MG tablet      11:58:                          3D   (three)           [facility]

al



               36                                 times           Center



                                                  daily.           

 

     norethindro      2021      Yes            1{tbl} QD   Take 1           [facility]



     ne        1-03                               tablet by           [facility]



     (MICRONOR)      11:58:                               mouth           [facility]

l



     0.35 mg per      36                                 daily.           Center



     tablet                                                        

 

     ondansetron      2021      Yes            4mg       Take 4 mg           [facility]

[facility]



     (ZOFRAN) 4      1-03                               by mouth 2           [facility]

[facility]



     MG tablet      11:58:                               (two)           Medical



               36                                 times           Center



                                                  daily as           



                                                  needed for           



                                                  Nausea.           

 

     famotidine      2021      Yes            20mg      Take 1           [facility]

[facility]



     (PEPCID) 20      1-03                               tablet (20           [facility]

[facility]



     MG tablet      00:00:                               mg total)           Med

ical



               00                                 by mouth           Center



                                                  every 12           



                                                  (twelve)           



                                                  hours.           

 

     famotidine      2021      Yes            20mg      Take 1           [facility]

[facility]



     (PEPCID) 20      1-03                               tablet (20           [facility]

[facility]



     MG tablet      00:00:                               mg total)           Med

ical



               00                                 by mouth           Center



                                                  every 12           



                                                  (twelve)           



                                                  hours.           

 

     famotidine      2021      Yes            20mg      Take 1           [facility]

t



     (PEPCID) 20      1-03                               tablet (20           [facility]

[facility]



     MG tablet      00:00:                               mg total)           Med

ical



               00                                 by mouth           Center



                                                  every 12           



                                                  (twelve)           



                                                  hours.           

 

     acyclovir      2019-0      Yes       [id number] 800mg      Take 2           

[facility]



     400 mg      7-25                               tablets by           [facility]



     tablet      00:00:                               mouth           [place]



               00                                 daily.           [facility]



                                                                 [facility]

 

     acyclovir      2019-0      Yes       [id number] 800mg      Take 2           

[facility]



     400 mg      7-25                               tablets by           [facility] of



     tablet      00:00:                               mouth           [place]



               00                                 daily.           [facility]



                                                                 [facility]

 

     acyclovir      2019-0      Yes       [id number] 800mg      Take 2           

[facility]



     400 mg      7-25                               tablets by           [facility] of



     tablet      00:00:                               mouth           [place]



               00                                 daily.           [facility]



                                                                 [facility]

 

     acyclovir      2019-0      Yes       [id number] 800mg      Take 2           

[facility]



     400 mg      7-25                               tablets by           ity of



     tablet      00:00:                               mouth           [place]



               00                                 daily.           [facility]



                                                                 [facility]

 

     acyclovir      2019-0      Yes       [id number] 800mg      Take 2           

[facility]



     400 mg      7-25                               tablets by           ity of



     tablet      00:00:                               mouth           [place]



               00                                 daily.           [facility]



                                                                 [facility]

 

     acyclovir      2019-0      Yes       [id number] 800mg      Take 2           

[facility]



     400 mg      7-25                               tablets by           ity of



     tablet      00:00:                               mouth           [place]



               00                                 daily.           Medical



                                                                 [facility]

 

     acyclovir      2019-0      Yes       [id number] 800mg      Take 2           

[facility]



     400 mg      7-25                               tablets by           ity of



     tablet      00:00:                               mouth           [place]



               00                                 daily.           Medical



                                                                 [facility]

 

     acyclovir      2019-0      Yes       [id number] 800mg      Take 2           

[facility]



     400 mg      7-25                               tablets by           ity of



     tablet      00:00:                               mouth           [place]



               00                                 daily.           [facility]



                                                                 [facility]

 

     acyclovir      2019-0      Yes       [id number] 800mg      Take 2           

[facility]



     400 mg      7-25                               tablets by           ity of



     tablet      00:00:                               mouth           [place]



               00                                 daily.           [facility]



                                                                 [facility]

 

     acyclovir      2019-0      Yes       [id number] 800mg      Take 2           

[facility]



     400 mg      7-25                               tablets by           ity of



     tablet      00:00:                               mouth           [place]



               00                                 daily.           [facility]



                                                                 [facility]

 

     acyclovir      2019-0      Yes       [id number] 800mg      Take 2           

[facility]



     400 mg      7-25                               tablets by           ity of



     tablet      00:00:                               mouth           [place]



               00                                 daily.           Medical



                                                                 [facility]

 

     acyclovir      2019-0      Yes       [id number] 800mg      Take 2           

[facility]



     400 mg      7-25                               tablets by           ity of



     tablet      00:00:                               mouth           [place]



               00                                 daily.           [facility]



                                                                 [facility]

 

     acyclovir      2019-0      Yes       [id number] 800mg      Take 2           

[facility]



     400 mg      7-25                               tablets by           ity of



     tablet      00:00:                               mouth           [place]



               00                                 daily.           [facility]



                                                                 [facility]

 

     acyclovir      2019-0      Yes       [id number] 800mg      Take 2           

[facility]



     400 mg      7-25                               tablets by           ity of



     tablet      00:00:                               mouth           [place]



               00                                 daily.           [facility]



                                                                 [facility]

 

     acyclovir      2019-0      Yes       [id number] 800mg      Take 2           

[facility]



     400 mg      7-25                               tablets by           ity of



     tablet      00:00:                               mouth           [place]



               00                                 daily.           [facility]



                                                                 [facility]

 

     acyclovir      2019-0      Yes       [id number] 800mg      Take 2           

[facility]



     400 mg      7-25                               tablets by           ity of



     tablet      00:00:                               mouth           [place]



               00                                 daily.           [facility]



                                                                 [facility]

 

     acyclovir      2019-0      Yes       [id number] 800mg      Take 2           

[facility]



     400 mg      7-25                               tablets by           ity of



     tablet      00:00:                               mouth           [place]



               00                                 daily.           [facility]



                                                                 [facility]

 

     acyclovir      2019-0      Yes       [id number] 800mg      Take 2           

[facility]



     400 mg      7-25                               tablets by           ity of



     tablet      00:00:                               mouth           [place]



               00                                 daily.           [facility]



                                                                 Br[facility]

 

     acyclovir      2019-0      Yes       [id number] 800mg      Take 2           

[facility]



     400 mg      7-25                               tablets by           ity of



     tablet      00:00:                               mouth           [place]



               00                                 daily.           [facility]



                                                                 Br[facility]

 

     acyclovir      2019-0      Yes       [id number] 800mg      Take 2           

[facility]



     400 mg      7-25                               tablets by           ity of



     tablet      00:00:                               mouth           [place]



               00                                 daily.           [facility]



                                                                 [facility]

 

     acyclovir      2019-0      Yes       [id number] 800mg      Take 2           

[facility]



     400 mg      7-25                               tablets by           ity of



     tablet      00:00:                               mouth           [place]



               00                                 daily.           [facility]



                                                                 [facility]

 

     acyclovir      2019-0      Yes       [id number] 800mg      Take 2           

[facility]



     400 mg      7-25                               tablets by           ity of



     tablet      00:00:                               mouth           [place]



               00                                 daily.           [facility]



                                                                 [facility]

 

     acyclovir      2019-0      Yes       [id number] 800mg      Take 2           

[facility]



     400 mg      7-25                               tablets by           ity of



     tablet      00:00:                               mouth           [place]



               00                                 daily.           [facility]



                                                                 [facility]

 

     acyclovir      2019-0      Yes       [id number] 800mg      Take 2           

[facility]



     400 mg      7-25                               tablets by           ity of



     tablet      00:00:                               mouth           [place]



               00                                 daily.           [facility]



                                                                 [facility]

 

     acyclovir      2019-0      Yes       [id number] 800mg      Take 2           

[facility]



     400 mg      7-25                               tablets by           ity of



     tablet      00:00:                               mouth           [place]



               00                                 daily.           [facility]



                                                                 [facility]

 

     acyclovir      2019-0      Yes       [id number] 800mg      Take 2           

[facility]



     400 mg      7-25                               tablets by           ity of



     tablet      00:00:                               mouth           [place]



               00                                 daily.           [facility]



                                                                 [facility]

 

     acyclovir      2019-0      Yes       [id number] 800mg      Take 2           

[facility]



     400 mg      7-25                               tablets by           ity of



     tablet      00:00:                               mouth           [place]



               00                                 daily.           [facility]



                                                                 [facility]

 

     acyclovir      2019-0      Yes       [id number] 800mg      Take 2           

[facility]



     400 mg      7-25                               tablets by           ity of



     tablet      00:00:                               mouth           [place]



               00                                 daily.           [facility]



                                                                 [facility]

 

     acyclovir      2019-0      Yes       [id number] 800mg      Take 2           

[facility]



     400 mg      7-25                               tablets by           ity of



     tablet      00:00:                               mouth           [place]



               00                                 daily.           [facility]



                                                                 [facility]

 

     acyclovir      2019-0      Yes       [id number] 800mg      Take 2           

[facility]



     400 mg      7-25                               tablets by           ity of



     tablet      00:00:                               mouth           [place]



               00                                 daily.           [facility]



                                                                 [facility]

 

     acyclovir      2019-0      Yes       [id number] 800mg      Take 2           

[facility]



     400 mg      7-25                               tablets by           ity of



     tablet      00:00:                               mouth           [place]



               00                                 daily.           [facility]

 

     acyclovir      -0      Yes       [id number] 800mg      Take 2           

[facility]



     400 mg      7-25                               tablets by           ity of



     tablet      00:00:                               mouth           [place]



               00                                 daily.           [facility]



                                                                 [facility]

 

     acyclovir      2019-0      Yes       [id number] 800mg      Take 2           

[facility]



     400 mg      7-25                               tablets by           ity of



     tablet      00:00:                               mouth           [place]



               00                                 daily.           [facility]



                                                                 [facility]

 

     acyclovir      2019-0      Yes       [id number] 800mg      Take 2           

[facility]



     400 mg      7-25                               tablets by           ity of



     tablet      00:00:                               mouth           [place]



               00                                 daily.           [facility]

 

     acyclovir      2019-0      Yes       [id number] 800mg      Take 2           

[facility]



     400 mg      7-25                               tablets by           ity of



     tablet      00:00:                               mouth           [place]



               00                                 daily.           [facility]



                                                                 [facility]

 

     acyclovir      2019-0      Yes       [id number] 800mg      Take 2           

[facility]



     400 mg      7-25                               tablets by           ity of



     tablet      00:00:                               mouth           [place]



               00                                 daily.           [facility]



                                                                 [facility]

 

     acyclovir      2019-0 3- No        [id number] 800mg      Take 2          

 [facility]



     400 mg      7-25 03-01                          tablets by           ity of



     tablet      00:00: 00:00                          mouth           [place]



               00   :00                           daily.           [facility]



                                                                 [facility]

 

     acyclovir      -0 3- No        [id number] 800mg      Take 2          

 [facility]



     400 mg      7-25 03-01                          tablets by           ity of



     tablet      00:00: 00:00                          mouth           [place]



               00   :00                           daily.           [facility]



                                                                 [facility]

 

     acyclovir      2019-0 3- No        [id number] 800mg      Take 2          

 [facility]



     400 mg      7-25                           tablets by           ity of



     tablet      00:00: 00:00                          mouth           [place]



               00   :00                           daily.           [facility]

 

     busPIRone      20170      Yes                                     [facility]



     10 mg      3-06                                              ity of



     tablet      00:00:                                              [place]



                                                               [facility]

 

     SERTraline      20170      Yes                                     [facility]



     50 mg      3-06                                              ity of



     tablet      00:00:                                              [place]



                                                               [facility]

 

     busPIRone      -      Yes                                     [facility]



     10 mg      3-06                                              ity of



     tablet      00:00:                                              [place]



                                                               [facility]

 

     SERTraline      20170      Yes                                     [facility]



     50 mg      3-06                                              ity of



     tablet      00:00:                                              [place]



                                                               [facility]

 

     busPIRone            Yes                                     [facility]



     10 mg      3-06                                              ity of



     tablet      00:00:                                              [place]



                                                               [facility]

 

     SERTraline      0      Yes                                     [facility]



     50 mg      3-06                                              ity of



     tablet      00:00:                                              [place]



                                                               [facility]

 

     busPIRone            Yes                                     [facility]



     10 mg      3-06                                              ity of



     tablet      00:00:                                              [place]



                                                               [facility]

 

     SERTraline      20170      Yes                                     [facility]



     50 mg      3-06                                              ity of



     tablet      00:00:                                              [place]



                                                               [facility]

 

     busPIRone      0      Yes                                     [facility]



     10 mg      3-06                                              ity of



     tablet      00:00:                                              [address]

 

     SERTraline      [address]70      Yes                                     [facility]



     50 mg      3-06                                              ity of



     tablet      00:00:                                              [place]



                                                               [facility]

 

     busPIRone      -0      Yes                                     [facility]



     10 mg      3-06                                              ity of



     tablet      00:00:                                              [place]



                                                               [facility]

 

     SERTraline      20170      Yes                                     [facility]



     50 mg      3-06                                              ity of



     tablet      00:00:                                              [place]



                                                               [facility]

 

     busPIRone      2017-0      Yes                                     [facility]



     10 mg      3-06                                              ity of



     tablet      00:00:                                              [place]



                                                               [facility]

 

     SERTraline      20170      Yes                                     [facility]



     50 mg      3-06                                              ity of



     tablet      00:00:                                              [place]



                                                               [facility]

 

     busPIRone      0      Yes                                     [facility]



     10 mg      3-06                                              ity of



     tablet      00:00:                                              [place]



                                                               [facility]

 

     SERTraline      -0      Yes                                     [facility]



     50 mg      3-06                                              ity of



     tablet      00:00:                                              [place]



                                                               [facility]

 

     busPIRone      0      Yes                                     [facility]



     10 mg      3-06                                              ity of



     tablet      00:00:                                              [address]

 

     SERTraline      -0      Yes                                     [facility]



     50 mg      3-06                                              ity of



     tablet      00:00:                                              [place]



                                                               [facility]

 

     busPIRone      0      Yes                                     [facility]



     10 mg      3-06                                              ity of



     tablet      00:00:                                              [place]



                                                               [facility]

 

     SERTraline      2017-0      Yes                                     [facility]



     50 mg      3-06                                              ity of



     tablet      00:00:                                              [place]



                                                               [facility]

 

     busPIRone      2017-0      Yes                                     [facility]



     10 mg      3-06                                              ity of



     tablet      00:00:                                              [place]



                                                               [facility]

 

     SERTraline      2017-0      Yes                                     [facility]



     50 mg      3-06                                              ity of



     tablet      00:00:                                              [place]



                                                               [facility]

 

     busPIRone      2017-0      Yes                                     [facility]



     10 mg      3-06                                              ity of



     tablet      00:00:                                              [place]



                                                               [facility]

 

     SERTraline      2017-0      Yes                                     [facility]



     50 mg      3-06                                              ity of



     tablet      00:00:                                              [place]



                                                               [facility]

 

     busPIRone      2017-0      Yes                                     [facility]



     10 mg      3-06                                              ity of



     tablet      00:00:                                              [address]

 

     SERTraline      2017-0      Yes                                     [facility]



     50 mg      3-06                                              ity of



     tablet      00:00:                                              [address]

 

     busPIRone      2017-0      Yes                                     [facility]



     10 mg      3-06                                              ity of



     tablet      00:00:                                              [address]

 

     SERTraline      2017-0      Yes                                     [facility]



     50 mg      3-06                                              ity of



     tablet      00:00:                                              [address]

 

     busPIRone      2017-0      Yes                                     [facility]



     10 mg      3-06                                              ity of



     tablet      00:00:                                              [address]

 

     SERTraline      2017-0      Yes                                     [facility]



     50 mg      3-06                                              ity of



     tablet      00:00:                                              [address]

 

     busPIRone      2017-0      Yes                                     [facility]



     10 mg      3-06                                              ity of



     tablet      00:00:                                              [address]

 

     SERTraline      2017-0      Yes                                     [facility]



     50 mg      3-06                                              ity of



     tablet      00:00:                                              [place]



                                                               [facility]

 

     busPIRone      2017-0      Yes                                     [facility]



     10 mg      3-06                                              ity of



     tablet      00:00:                                              [place]



                                                               [facility]

 

     SERTraline      2017-0      Yes                                     [facility]



     50 mg      3-06                                              ity of



     tablet      00:00:                                              [address]

 

     busPIRone      2017-0      Yes                                     [facility]



     10 mg      3-06                                              ity of



     tablet      00:00:                                              [place]



                                                               [facility]

 

     SERTraline      2017-0      Yes                                     [facility]



     50 mg      3-06                                              ity of



     tablet      00:00:                                              [address]

 

     busPIRone      2017-0      Yes                                     [facility]



     10 mg      3-06                                              ity of



     tablet      00:00:                                              [address]

 

     SERTraline      2017-0      Yes                                     [facility]



     50 mg      3-06                                              ity of



     tablet      00:00:                                              [place]



                                                               [facility]

 

     busPIRone      2017-0      Yes                                     [facility]



     10 mg      3-06                                              ity of



     tablet      00:00:                                              [address]

 

     SERTraline      2017-0      Yes                                     [facility]



     50 mg      3-06                                              ity of



     tablet      00:00:                                              [address]

 

     busPIRone      2017-0      Yes                                     [facility]



     10 mg      3-06                                              ity of



     tablet      00:00:                                              [address]

 

     SERTraline      2017-0      Yes                                     [facility]



     50 mg      3-06                                              ity of



     tablet      00:00:                                              [address]

 

     busPIRone      2017-0      Yes                                     [facility]



     10 mg      3-06                                              ity of



     tablet      00:00:                                              [address]

 

     SERTraline      2017-0      Yes                                     [facility]



     50 mg      3-06                                              ity of



     tablet      00:00:                                              [address]

 

     busPIRone      2017-0      Yes                                     [facility]



     10 mg      3-06                                              ity of



     tablet      00:00:                                              [address]

 

     SERTraline      2017-0      Yes                                     [facility]



     50 mg      3-06                                              ity of



     tablet      00:00:                                              [address]

 

     busPIRone      2017-0      Yes                                     [facility]



     10 mg      3-06                                              ity of



     tablet      00:00:                                              [address]

 

     SERTraline      2017-0      Yes                                     [facility]



     50 mg      3-06                                              ity of



     tablet      00:00:                                              [address]

 

     busPIRone      2017-0      Yes                                     [facility]



     10 mg      3-06                                              ity of



     tablet      00:00:                                              [address] [address]

 

     SERTraline      2017-0      Yes                                     [facility]



     50 mg      3-06                                              ity of



     tablet      00:00:                                              [address]

 

     busPIRone      2017-0      Yes                                     [facility]



     10 mg      3-06                                              ity of



     tablet      00:00:                                              [address]

 

     SERTraline      2017-0      Yes                                     [facility]



     50 mg      3-06                                              ity of



     tablet      00:00:                                              [address]

 

     busPIRone      2017-0      Yes                                     [facility]



     10 mg      3-06                                              ity of



     tablet      00:00:                                              [address]

 

     SERTraline      2017-0      Yes                                     [facility]



     50 mg      3-06                                              ity of



     tablet      00:00:                                              [address]

 

     busPIRone      2017-0      Yes                                     [facility]



     10 mg      3-06                                              ity of



     tablet      00:00:                                              [address]

 

     SERTraline      2017-0      Yes                                     [facility]



     50 mg      3-06                                              ity of



     tablet      00:00:                                              [address]

 

     busPIRone      2017-0      Yes                                     [facility]



     10 mg      3-06                                              ity of



     tablet      00:00:                                              [address]

 

     SERTraline      2017-0      Yes                                     [facility]



     50 mg      3-06                                              ity of



     tablet      00:00:                                              [place]



               [facility]                                                [facility]



                                                                 [facility]

 

     busPIRone      2022- No                                      [facility]



     10 mg      3-06 12-29                                         ity of



     tablet      00:00: 00:00                                         [place]



               00   :00                                          [facility]



                                                                 [facility]

 

     SERTraline      2022- No                                      [facility]

s



     50 mg      3-06 12-29                                         ity of



     tablet      00:00: 00:00                                         [place]



               00   :00                                          [facility]



                                                                 [facility]

 

     busPIRone      -2022- No                                      [facility]



     10 mg      3-06 12-29                                         ity of



     tablet      00:00: 00:00                                         [place]



               00   :00                                          [facility]



                                                                 [facility]

 

     SERTraline      2022- No                                      [facility]

s



     50 mg      3-06 12-29                                         ity of



     tablet      00:00: 00:00                                         [place]



               00   :00                                          [facility]



                                                                 [facility]anch







Immunizations







           Ordered    Filled     Date       Status     Comments   Source



           Immunization Name Immunization Name                                  

 

           Influenza Virus            2022 Completed             [facility]

[facility]



           [facility],            00:00:00                         [place] [facility]



           Preserv and ABX                                             [facility]anch



           Free 6 MO-64 YRS                                             

 

           Influenza Virus            2022 Completed             [facility]

[facility]



           [facility],            00:00:00                         [facility]



           Preserv and ABX                                             [facility]



           Free 6 MO-64 YRS                                             

 

           Influenza Virus            2022 Completed             [facility]

[facility]



           [facility],            00:00:00                         [place] [facility]



           Preserv and ABX                                             [facility]



           Free 6 MO-64 YRS                                             

 

           Influenza Virus            2022 Completed             [facility]

[facility]



           [facility],            00:00:00                         [place] [facility]



           Preserv and ABX                                             [facility]



           Free 6 MO-64 YRS                                             

 

           Influenza Virus            2022 Completed             [facility]

[facility]



           [facility],            00:00:00                         [place] [facility]



           Preserv and ABX                                             [facility]anch



           Free 6 MO-64 YRS                                             

 

           Influenza Virus            2022 Completed             [facility]

[facility]



           [facility],            00:00:00                         [place] [facility]



           Preserv and ABX                                             [facility]anch



           Free 6 MO-64 YRS                                             

 

           Influenza Virus            2022 Completed             [facility]

[facility]



           [facility],            00:00:00                         [place] [facility]



           Preserv and ABX                                             [facility]anch



           Free 6 MO-64 YRS                                             

 

           Influenza Virus            2022 Completed             [facility]

[facility]



           [facility],            00:00:00                         [place] [facility]



           Preserv and ABX                                             [facility]anch



           Free 6 MO-64 YRS                                             

 

           Influenza Virus            2022 Completed             [facility]

[facility]



           [facility],            00:00:00                         [place] [facility]



           Preserv and ABX                                             [facility]anch



           Free 6 MO-64 YRS                                             

 

           Influenza Virus            2022 Completed             [facility]

[facility]



           [facility],            00:00:00                         [place] [facility]



           Preserv and ABX                                             [facility]anch



           Free 6 MO-64 YRS                                             

 

           Influenza Virus            2022 Completed             [facility]

[facility]



           [facility],            00:00:00                         [place] [facility]



           Preserv and ABX                                             [facility]anch



           Free 6 MO-64 YRS                                             

 

           Influenza Virus            2022 Completed             [facility]

[facility]



           [facility],            00:00:00                         [place] [facility]



           Preserv and ABX                                             [facility]anch



           Free 6 MO-64 YRS                                             

 

           Influenza Virus            2022 Completed             [facility]

[facility]



           [facility],            00:00:00                         [place] [facility]



           Preserv and ABX                                             [facility]anch



           Free 6 MO-64 YRS                                             

 

           Influenza Virus            2022 Completed             [facility]

[facility]



           [facility],            00:00:00                         [place] [facility]



           Preserv and ABX                                             [facility]anch



           Free 6 MO-64 YRS                                             

 

           Influenza Virus            2022 Completed             [facility]

[facility]



           [facility],            00:00:00                         [place] [facility]



           Preserv and ABX                                             [facility]anch



           Free 6 MO-64 YRS                                             

 

           Influenza Virus            2022 Completed             [facility]

[facility]



           [facility],            00:00:00                         [place] [facility]



           Preserv and ABX                                             [facility]anch



           Free 6 MO-64 YRS                                             

 

           Influenza Virus            2022 Completed             [facility]

[facility]



           [facility],            00:00:00                         [place] [facility]



           Preserv and ABX                                             [facility]anch



           Free 6 MO-64 YRS                                             

 

           Influenza Virus            2022 Completed             [facility]

[facility]



           [facility],            00:00:00                         [place] [facility]



           Preserv and ABX                                             [facility]anch



           Free 6 MO-64 YRS                                             

 

           Influenza Virus            2022 Completed             [facility]

[facility]



           [facility],            00:00:00                         [place] [facility]



           Preserv and ABX                                             [facility]anch



           Free 6 MO-64 YRS                                             

 

           Influenza Virus            2022 Completed             [facility]

[facility]



           [facility],            00:00:00                         [place] [facility]



           Preserv and ABX                                             [facility]anch



           Free 6 MO-64 YRS                                             

 

           Influenza Virus            2022 Completed             [facility]

[facility]



           [facility],            00:00:00                         [place] [facility]



           Preserv and ABX                                             [facility]anch



           Free 6 MO-64 YRS                                             

 

           Influenza Virus            2022 Completed             [facility]

[facility]



           [facility],            00:00:00                         [place] [facility]



           Preserv and ABX                                             [facility]anch



           Free 6 MO-64 YRS                                             

 

           Influenza Virus            2022 Completed             [facility]

[facility]



           [facility],            00:00:00                         [place] [facility]



           Preserv and ABX                                             [facility]anch



           Free 6 MO-64 YRS                                             

 

           Influenza Virus            2022 Completed             [facility]

[facility]



           [facility],            00:00:00                         [place] [facility]



           Preserv and ABX                                             [facility]anch



           Free 6 MO-64 YRS                                             

 

           Influenza Virus            2022 Completed             [facility]

[facility]



           [facility],            00:00:00                         [place] [facility]



           Preserv and ABX                                             [facility]anch



           Free 6 MO-64 YRS                                             

 

           Influenza Virus            2022 Completed             [facility]

[facility]



           [facility],            00:00:00                         [place] [facility]



           Preserv and ABX                                             [facility]anch



           Free 6 MO-64 YRS                                             

 

           Influenza Virus            2022 Completed             [facility]

[facility]



           [facility],            00:00:00                         [place] [facility]



           Preserv and ABX                                             [facility]anch



           Free 6 MO-64 YRS                                             

 

           Influenza Virus            2022 Completed             [facility]

[facility]



           [facility],            00:00:00                         [place] [facility]



           Preserv and ABX                                             [facility]anch



           Free 6 MO-64 YRS                                             

 

           Influenza Virus            2022 Completed             [facility]

[facility]



           [facility],            00:00:00                         [place] [facility]



           Preserv and ABX                                             [facility]anch



           Free 6 MO-64 YRS                                             

 

           Influenza Virus            2022 Completed             [facility]

[facility]



           [facility] IM,            00:00:00                         [place] [facility]



           Preserv and ABX                                             [facility]anch



           Free 6 MO-64 YRS                                             

 

           Influenza Virus            2022 Completed             [facility]

[facility]



           [facility],            00:00:00                         [place] [facility]



           Preserv and ABX                                             [facility]anch



           Free 6 MO-64 YRS                                             

 

           Influenza Virus            2022 Completed             [facility]

[facility]



           [facility],            00:00:00                         [place] [facility]



           Preserv and ABX                                             [facility]anch



           Free 6 MO-64 YRS                                             

 

           Influenza Virus            2022 Completed             [facility]

[facility]



           [facility],            00:00:00                         [place] [facility]



           Preserv and ABX                                             [facility]anch



           Free 6 MO-64 YRS                                             

 

           Influenza Virus            2022 Completed             [facility]

[facility]



           [facility],            00:00:00                         [place] [facility]



           Preserv and ABX                                             [facility]anch



           Free 6 MO-64 YRS                                             

 

           Influenza Virus            2022 Completed             [facility]

[facility]



           [facility],            00:00:00                         [place] [facility]



           Preserv and ABX                                             Branch



           Free 6 MO-64 YRS                                             

 

           Influenza Virus            2022 Completed             [facility]

[facility]



           [facility],            00:00:00                         [place] [facility]



           Preserv and ABX                                             Branch



           Free 6 MO-64 YRS                                             

 

           Influenza Virus            2022 Completed             [facility]

[facility]



           [facility] IM,            00:00:00                         [place] [facility]



           Preserv and ABX                                             Branch



           Free 6 MO-64 YRS                                             

 

           Influenza Virus            2022 Completed             [facility]

[facility]



           [facility] IM,            00:00:00                         [place] [facility]



           Preserv and ABX                                             Branch



           Free 6 MO-64 YRS                                             

 

           Influenza Virus            2022 Completed             [facility]

[facility]



           [facility] IM,            00:00:00                         [place] [facility]



           Preserv and ABX                                             Branch



           Free 6 MO-64 YRS                                             

 

           Influenza Virus            2022 Completed             [facility]

[facility]



           [facility] IM,            00:00:00                         [place] [facility]



           Preserv and ABX                                             Branch



           Free 6 MO-64 YRS                                             

 

           Influenza Virus            2022 Completed             [facility]

[facility]



           [facility] IM,            00:00:00                         [place] [facility]



           Preserv and ABX                                             Branch



           Free 6 MO-64 YRS                                             

 

           Influenza Virus            2022 Completed             [facility]

[facility]



           [facility] IM,            00:00:00                         [place] [facility]



           Preserv and ABX                                             Branch



           Free 6 MO-64 YRS                                             

 

           Influenza Virus            2022 Completed             [facility]

[facility]



           [facility] IM,            00:00:00                         [place] [facility]



           Preserv and ABX                                             Branch



           Free 6 MO-64 YRS                                             

 

           Influenza Virus            2022 Completed             [facility]

[facility]



           [facility] IM,            00:00:00                         [place] [facility]



           Preserv and ABX                                             Branch



           Free 6 MO-64 YRS                                             

 

           Influenza Virus            2022 Completed             [facility]

[facility]



           [facility] IM,            00:00:00                         [place] [facility]



           Preserv and ABX                                             Branch



           Free 6 MO-64 YRS                                             

 

           Influenza Virus            2022 Completed             [facility]

[facility] of



           [facility]acc[facility] IM,            00:00:00                         [place] [facility]



           Preserv and ABX                                             Branch



           Free 6 MO-64 YRS                                             

 

           SARS-COV-2 COVID-19            2021 Completed             [facility]ive

rsity of



           PFIZER VACCINE            00:00:00                         [facility]

 

           SARS-COV-2 COVID-19            2021 Completed             [facility]

rs[facility] of



           PFIZER VACCINE            00:00:00                         [facility]

 

           SARS-COV-2 COVID-19            2021 Completed             [facility]

rs[facility] of



           PFIZER VACCINE            00:00:00                         [facility]

[facility]



                                                                  [facility]

 

           SARS-COV-2 COVID-19            2021 Completed             [facility]

rs[facility] of



           PFIZER VACCINE            00:00:00                         [place] [facility]

[facility]



                                                                  [facility]

 

           SARS-COV-2 COVID-19            2021 Completed             [facility]

rs[facility] of



           PFIZER VACCINE            00:00:00                         [place] [facility]

[facility]



                                                                  [facility]

 

           SARS-COV-2 COVID-19            2021 Completed             [facility]

rs[facility] of



           PFIZER VACCINE            00:00:00                         [place] [facility]

[facility]



                                                                  [facility]

 

           SARS-COV-2 COVID-19            2021 Completed             [facility]

rs[facility] of



           PFIZER VACCINE            00:00:00                         [place] [facility]

[facility]



                                                                  [facility]

 

           SARS-COV-2 COVID-19            2021 Completed             [facility]

rs[facility] of



           PFIZER VACCINE            00:00:00                         [place] [facility]

[facility]



                                                                  [facility]

 

           SARS-COV-2 COVID-19            2021 Completed             [facility]

rs[facility] of



           PFIZER VACCINE            00:00:00                         [place] [facility]

[facility]



                                                                  [facility]

 

           SARS-COV-2 COVID-19            2021 Completed             [facility]

rsity of



           PFIZER VACCINE            00:00:00                         [place] [facility]

[facility]



                                                                  [facility]

 

           SARS-COV-2 COVID-19            2021 Completed             [facility]

rsity of



           PFIZER VACCINE            00:00:00                         [place] [facility]

[facility]



                                                                  [facility]

 

           SARS-COV-2 COVID-19            2021 Completed             [facility]

rsity of



           PFIZER VACCINE            00:00:00                         [place] [facility]

[facility]



                                                                  [facility]

 

           SARS-COV-2 COVID-19            2021 Completed             [facility]

rsity of



           PFIZER VACCINE            00:00:00                         [place] [facility]

[facility]



                                                                  [facility]anch

 

           SARS-COV-2 COVID-19            2021 Completed             [facility]

rsity of



           PFIZER VACCINE            00:00:00                         [place] [facility]

[facility]



                                                                  [facility]

 

           SARS-COV-2 COVID-19            2021 Completed             [facility]

rs[facility] of



           PFIZER VACCINE            00:00:00                         [place] [facility]

[facility]



                                                                  [facility]

 

           SARS-COV-2 COVID-19            2021 Completed             [facility]

rsity of



           PFIZER VACCINE            00:00:00                         [place] [facility]

[facility]



                                                                  [facility]anch

 

           SARS-COV-2 COVID-19            2021 Completed             Unive

rsity of



           PFIZER VACCINE            00:00:00                         [facility]

 

           SARS-COV-2 COVID-19            2021 Completed             Unive

rsity of



           PFIZER VACCINE            00:00:00                         [facility]

 

           SARS-COV-2 COVID-19            2021 Completed             Unive

rsity of



           PFIZER VACCINE            00:00:00                         [facility]

 

           SARS-COV-2 COVID-19            2021 Completed             Unive

rsity of



           PFIZER VACCINE            00:00:00                         [facility]

 

           SARS-COV-2 COVID-19            2021 Completed             Unive

rsity of



           PFIZER VACCINE            00:00:00                         [facility]

 

           SARS-COV-2 COVID-19            2021 Completed             Unive

rsity of



           PFIZER VACCINE            00:00:00                         [facility]

[facility]



                                                                  [facility]

 

           SARS-COV-2 COVID-19            2021 Completed             [facility]

[facility]



           PFIZER VACCINE            00:00:00                         [facility]

 

           SARS-COV-2 COVID-19            2021 Completed             [facility]

[facility] of



           PFIZER VACCINE            00:00:00                         [facility]

 

           SARS-COV-2 COVID-19            2021 Completed             [facility]

rs[facility] of



           PFIZER VACCINE            00:00:00                         [facility]

 

           SARS-COV-2 COVID-19            2021 Completed             [facility]

[facility] of



           PFIZER VACCINE            00:00:00                         [facility]

 

           SARS-COV-2 COVID-19            2021 Completed             Unive

rsity of



           PFIZER VACCINE            00:00:00                         [facility]

 

           SARS-COV-2 COVID-19            2021 Completed             [facility]

rs[facility] of



           PFIZER VACCINE            00:00:00                         [facility]

 

           SARS-COV-2 COVID-19            2021 Completed             [facility]

rs[facility] of



           PFIZER VACCINE            00:00:00                         [facility]

 

           SARS-COV-2 COVID-19            2021 Completed             [facility]

rs[facility] of



           PFIZER VACCINE            00:00:00                         [facility]

 

           SARS-COV-2 COVID-19            2021 Completed             Un[facility]

rsity of



           PFIZER VACCINE            00:00:00                         [facility]

 

           SARS-COV-2 COVID-19            2021 Completed             Unive

rs[facility] of



           PFIZER VACCINE            00:00:00                         [facility]

 

           SARS-COV-2 COVID-19            2021 Completed             Unive

rsity of



           PFIZER VACCINE            00:00:00                         [facility]

 

           SARS-COV-2 COVID-19            2021 Completed             [facility]

rs[facility] of



           PFIZER VACCINE            00:00:00                         [facility]

[facility]



                                                                  [facility]

 

           SARS-COV-2 COVID-19            2021 Completed             [facility]ive

rs[facility] of



           PFIZER VACCINE            00:00:00                         [facility]

[facility]



                                                                  [facility]

 

           SARS-COV-2 COVID-19            2021 Completed             Unive

rs[facility] of



           PFIZER VACCINE            00:00:00                         [facility]

 

           SARS-COV-2 COVID-19            2021 Completed             [facility]

rs[facility] of



           PFIZER VACCINE            00:00:00                         [facility]

[facility]



                                                                  [facility]

 

           SARS-COV-2 COVID-19            2021 Completed             [facility]

rs[facility] of



           PFIZER VACCINE            00:00:00                         [facility]

[facility]



                                                                  [facility]

 

           SARS-COV-2 COVID-19            2021 Completed             [facility]

[facility] of



           PFIZER VACCINE            00:00:00                         [facility]

[facility]



                                                                  [facility]

 

           SARS-COV-2 COVID-19            2021 Completed             [facility]

[facility] of



           PFIZER VACCINE            00:00:00                         [facility]

[facility]



                                                                  [facility]

 

           SARS-COV-2 COVID-19            2021 Completed             [facility]

[facility] of



           PFIZER VACCINE            00:00:00                         [facility]

 

           SARS-COV-2 COVID-19            2021 Completed             Unive

rs[facility] of



           PFIZER VACCINE            00:00:00                         [facility]

 

           SARS-COV-2 COVID-19            2021 Completed             Unive

rsity of



           PFIZER VACCINE            00:00:00                         [facility]

 

           SARS-COV-2 COVID-19            2021 Completed             [facility]

[facility] of



           PFIZER VACCINE            00:00:00                         [facility]

 

           SARS-COV-2 COVID-19            2021 Completed             [facility]

[facility] of



           PFIZER VACCINE            00:00:00                         [facility]

[facility]



                                                                  [facility]

 

           SARS-COV-2 COVID-19            2021 Completed             [facility]

[facility] of



           PFIZER VACCINE            00:00:00                         [facility]

[facility]



                                                                  [facility]

 

           SARS-COV-2 COVID-19            2021 Completed             [facility]

[facility] of



           PFIZER VACCINE            00:00:00                         [facility]

[facility]



                                                                  [facility]

 

           SARS-COV-2 COVID-19            2021 Completed             [facility]

[facility] of



           PFIZER VACCINE            00:00:00                         [facility]

 

           SARS-COV-2 COVID-19            2021 Completed             [facility]

[facility] of



           PFIZER VACCINE            00:00:00                         [facility]

 

           SARS-COV-2 COVID-19            2021 Completed             [facility]ive

[facility] of



           PFIZER VACCINE            00:00:00                         [facility]

 

           SARS-COV-2 COVID-19            2021 Completed             [facility]

[facility] of



           PFIZER VACCINE            00:00:00                         [facility]

 

           SARS-COV-2 COVID-19            2021 Completed             [facility]

[facility] of



           PFIZER VACCINE            00:00:00                         [facility]

 

           Influenza Virus            2019 Completed             [facility]

[facility]



           Vaccine Quad .5 mL            00:00:00                         [facility]



           [facility]                                               [facility]

 

           Influenza Virus            2019 Completed             [facility]

[facility] of



           Vaccine Quad .5 mL            00:00:00                         [facility]



           [facility]                                               [facility]

 

           Influenza Virus            2019 Completed             [facility]

[facility]



           Vaccine Quad .5 mL            00:00:00                         [facility]



           [facility]                                               [facility]

 

           Influenza Virus            2019 Completed             [facility]

[facility] of



           Vaccine Quad .5 mL            00:00:00                         [facility]



           [facility]                                               [facility]

 

           Influenza Virus            2019 Completed             [facility]

[facility] of



           Vaccine Quad .5 mL            00:00:00                         [facility]



           [facility]                                               [facility]

 

           Influenza Virus            2019 Completed             [facility]

[facility] of



           Vaccine Quad .5 mL            00:00:00                         [facility]



           [facility]                                               [facility]

 

           Influenza Virus            2019 Completed             [facility]

[facility] of



           Vaccine Quad .5 mL            00:00:00                         [facility]



           [facility]                                               [facility]

 

           Influenza Virus            2019 Completed             [facility]

[facility] of



           Vaccine Quad .5 mL            00:00:00                         [facility]



           [facility]                                               [facility]

 

           Influenza Virus            2019 Completed             [facility]

[facility] of



           Vaccine Quad .5 mL            00:00:00                         [facility]



           [facility]                                               [facility]

 

           Influenza Virus            2019 Completed             [facility]

[facility] of



           Vaccine Quad .5 mL            00:00:00                         [facility]



           [facility]                                               [facility]

 

           Influenza Virus            2019 Completed             [facility]

[facility] of



           Vaccine Quad .5 mL            00:00:00                         [facility]



           [facility]                                               [facility]

 

           Influenza Virus            2019 Completed             [facility]

[facility]



           Vaccine Quad .5 mL            00:00:00                         [facility]



           [facility]                                               [facility]

 

           Influenza Virus            2019 Completed             [facility]

[facility] of



           Vaccine Quad .5 mL            00:00:00                         [facility]



           [facility]                                               [facility]

 

           Influenza Virus            2019 Completed             [facility]

[facility]



           Vaccine Quad .5 mL            00:00:00                         [facility]



           [facility]                                               [facility]

 

           Influenza Virus            2019 Completed             [facility]

[facility] of



           Vaccine Quad .5 mL            00:00:00                         [facility]



           [facility]                                               [facility]

 

           Influenza Virus            2019 Completed             [facility]

[facility] of



           Vaccine Quad .5 mL            00:00:00                         [facility]



           [facility]                                               [facility]

 

           Influenza Virus            2019 Completed             [facility]

[facility]



           Vaccine Quad .5 mL            00:00:00                         [facility]



           [facility]                                               [facility]

 

           Influenza Virus            2019 Completed             [facility]

[facility]



           Vaccine Quad .5 mL            00:00:00                         [facility]



           [facility]                                               [facility]

 

           Influenza Virus            2019 Completed             [facility]

[facility]



           Vaccine Quad .5 mL            00:00:00                         [facility]



           [facility]                                               [facility]

 

           Influenza Virus            2019 Completed             [facility]

[facility] of



           Vaccine Quad .5 mL            00:00:00                         [facility]



           [facility]                                               [facility]

 

           Influenza Virus            2019 Completed             [facility]

[facility]



           Vaccine Quad .5 mL            00:00:00                         [facility]



           [facility]                                               [facility]

 

           Influenza Virus            2019 Completed             [facility]

[facility]



           Vaccine Quad .5 mL            00:00:00                         [facility]



           [facility]                                               [facility]

 

           Influenza Virus            2019 Completed             [facility]

[facility] of



           Vaccine Quad .5 mL            00:00:00                         [facility]



           [facility]                                               [facility]

 

           Influenza Virus            2019 Completed             [facility]

[facility]



           Vaccine Quad .5 mL            00:00:00                         [facility]



           [facility]                                               [facility]

 

           Influenza Virus            2019 Completed             [facility]

[facility]



           Vaccine Quad .5 mL            00:00:00                         [facility]



           [facility]                                               [facility]

 

           Influenza Virus            2019 Completed             [facility]

[facility]



           Vaccine Quad .5 mL            00:00:00                         [facility]



           [facility]                                               [facility]

 

           Influenza Virus            2019 Completed             [facility]

[facility]



           Vaccine Quad .5 mL            00:00:00                         [facility]



           [facility]                                               [facility]

 

           Influenza Virus            2019 Completed             [facility]

[facility]



           Vaccine Quad .5 mL            00:00:00                         [facility]



           [facility]                                               [facility]

 

           Influenza Virus            2019 Completed             [facility]

[facility]



           Vaccine Quad .5 mL            00:00:00                         [facility]



           [facility]                                               [facility]

 

           Influenza Virus            2019 Completed             [facility]

[facility]



           Vaccine Quad .5 mL            00:00:00                         [facility]



           [facility]                                               [facility]

 

           Influenza Virus            2019 Completed             [facility]

[facility] of



           Vaccine Quad .5 mL            00:00:00                         [facility]



           [facility]                                               [facility]

 

           Influenza Virus            2019 Completed             [facility]

[facility] of



           Vaccine Quad .5 mL            00:00:00                         [facility]



           [facility]                                               [facility]

 

           Influenza Virus            2019 Completed             [facility]

[facility]



           Vaccine Quad .5 mL            00:00:00                         [facility]



           [facility]                                               [facility]

 

           Influenza Virus            2019 Completed             [facility]

[facility]



           Vaccine Quad .5 mL            00:00:00                         [facility]



           [facility]                                               [facility]

 

           Influenza Virus            2019 Completed             [facility]

[facility]



           Vaccine Quad .5 mL            00:00:00                         [facility]



           [facility]                                               [facility]

 

           Influenza Virus            2019 Completed             [facility]

[facility]



           Vaccine Quad .5 mL            00:00:00                         [facility]



           [facility]                                               [facility]

 

           Influenza Virus            2019 Completed             [facility]

[facility]



           Vaccine Quad .5 mL            00:00:00                         [facility]



           [facility]                                               [facility]

 

           Influenza Virus            2019 Completed             [facility]

[facility]



           Vaccine Quad .5 mL            00:00:00                         [facility]



           [facility]                                               [facility]

 

           Influenza Virus            2019 Completed             [facility]

[facility]



           Vaccine Quad .5 mL            00:00:00                         [facility]



           [facility]                                               [facility]

 

           Influenza Virus            2019 Completed             [facility]

[facility]



           Vaccine Quad .5 mL            00:00:00                         [facility]



           [facility]                                               [facility]

 

           Influenza Virus            2019 Completed             [facility]

[facility]



           Vaccine Quad .5 mL            00:00:00                         [facility]



           [facility]                                               [facility]

 

           Influenza Virus            2019 Completed             [facility]

[facility]



           Vaccine Quad .5 mL            00:00:00                         [facility]



           [facility]                                               [facility]

 

           Influenza Virus            2019 Completed             [facility]

[facility] of



           Vaccine Quad .5 mL            00:00:00                         [facility]



           [facility]                                               [facility]

 

           Influenza Virus            2019 Completed             [facility]

[facility] of



           Vaccine Quad .5 mL            00:00:00                         [facility]



           [facility]                                               [facility]

 

           Influenza Virus            2019 Completed             [facility]

[facility] of



           Vaccine Quad .5 mL            00:00:00                         [facility]



           [facility]                                               [facility]

 

           Influenza Virus            2019 Completed             [facility]

[facility]



           Vaccine Quad .5 mL            00:00:00                         [facility]



           [facility]                                               [facility]

 

           Influenza Virus            2019 Completed             [facility]

[facility] of



           Vaccine Quad .5 mL            00:00:00                         [facility]



           [facility]                                               [facility]

 

           Influenza Virus            2019 Completed             [facility]

[facility] of



           Vaccine Quad .5 mL            00:00:00                         [facility]



           [facility]                                               [facility]

 

           Influenza Virus            2019 Completed             [facility]

[facility] of



           Vaccine Quad .5 mL            00:00:00                         [facility]



           [facility]                                               [facility]

 

           Influenza Virus            2019 Completed             [facility]

[facility] of



           Vaccine Quad .5 mL            00:00:00                         [facility]



           [facility]                                               [facility]

 

           Influenza Virus            2019 Completed             [facility]

[facility]



           Vaccine Quad .5 mL            00:00:00                         [facility]



           [facility]                                               [facility]

 

           Influenza Virus            2019 Completed             [facility]

[facility] of



           Vaccine Quad .5 mL            00:00:00                         [facility]



           [facility]                                               [facility]

 

           Influenza Virus            2019 Completed             [facility]

[facility] of



           Vaccine Quad .5 mL            00:00:00                         [facility]



           [facility]                                               [facility]

 

           Influenza Virus            2019 Completed             [facility]

[facility] of



           Vaccine Quad .5 mL            00:00:00                         [facility]



           [facility]                                               [facility]

 

           Influenza Virus            2019 Completed             [facility]

[facility] of



           Vaccine Quad .5 mL            00:00:00                         [facility]



           IM 6+ MO                                               [place]

 

           Influenza Virus            2019 Completed             [facility]

[facility] of



           Vaccine Quad .5 mL            00:00:00                         [facility]



           IM 6+ MO                                               [place]

 

           Influenza Virus            2019 Completed             [facility]

[facility] of



           Vaccine Quad .5 mL            00:00:00                         [facility] 6+ MO                                               [place]

 

           Varicella             2015 Completed             [facility]



           (varivax)(chicken            00:00:00                         [facility]



           pox)                                                   [facility]

 

           Varicella             2015 Completed             [facility]



           (varivax)(chicken            00:00:00                         [place] [facility]



           pox)                                                   [facility]

 

           Varicella             2015 Completed             [facility]



           (varivax)(chicken            00:00:00                         [facility]



           pox)                                                   [facility]

 

           Varicella             2015 Completed             [facility]



           (varivax)(chicken            00:00:00                         [place] [facility]



           pox)                                                   [facility]

 

           Varicella             2015 Completed             [facility]



           (varivax)(chicken            00:00:00                         [facility]



           pox)                                                   [facility]

 

           Varicella             2015 Completed             [facility]



           (varivax)(chicken            00:00:00                         [facility]



           pox)                                                   [facility]

 

           Varicella             2015 Completed             [facility]



           (varivax)(chicken            00:00:00                         [facility]



           pox)                                                   [facility]

 

           Varicella             2015 Completed             [facility]



           (varivax)(chicken            00:00:00                         [facility]



           pox)                                                   [facility]

 

           Varicella             2015 Completed             [facility]



           (varivax)(chicken            00:00:00                         [facility]



           pox)                                                   [facility]

 

           Varicella             2015 Completed             [facility]



           (varivax)(chicken            00:00:00                         [place] [facility]



           pox)                                                   [facility]

 

           Varicella             2015 Completed             [facility]



           (varivax)(chicken            00:00:00                         [place] [facility]



           pox)                                                   [facility]

 

           Varicella             2015 Completed             [facility]



           (varivax)(chicken            00:00:00                         [place] [facility]



           pox)                                                   [facility]

 

           Varicella             2015 Completed             [facility]



           (varivax)(chicken            00:00:00                         [facility]



           pox)                                                   [facility]

 

           Varicella             2015 Completed             [facility]



           (varivax)(chicken            00:00:00                         [facility]



           pox)                                                   [facility]

 

           Varicella             2015 Completed             [facility]



           (varivax)(chicken            00:00:00                         [place] [facility]

[facility]



           pox)                                                   Branch

 

           Varicella             2015 Completed             [facility]



           ([facility]            00:00:00                         [place] [facility]



           pox)                                                   [facility]

 

           Varicella             2015 Completed             [facility]



           ([facility]icken            00:00:00                         [facility]



           [facility]ox)                                                   [facility]

 

           Varicella             2015 Completed             [facility]



           ([facility]chicken            00:00:00                         [place] [facility]



           [facility]ox)                                                   [facility]

 

           Varicella             2015 Completed             [facility]



           ([facility]chicken            00:00:00                         [facility]



           [facility]ox)                                                   [facility]

 

           Varicella             2015 Completed             [facility]



           ([facility]chicken            00:00:00                         [facility]



           [facility]ox)                                                   [facility]

 

           Varicella             2015 Completed             [facility]



           ([facility]chicken            00:00:00                         [facility]



           [facility])                                                   [facility]

 

           Varicella             2015 Completed             [facility]



           ([facility]chicken            00:00:00                         [facility]



           [facility])                                                   [facility]

 

           Varicella             2015 Completed             [facility]



           ([facility])(chicken            00:00:00                         [facility]



           [facility])                                                   [facility]

 

           Varicella             2015 Completed             [facility]



           ([facility])(chicken            00:00:00                         [facility]



           [facility])                                                   [facility]

 

           Varicella             2015 Completed             [facility]



           ([facility])(chicken            00:00:00                         [facility]



           [facility])                                                   [facility]

 

           Varicella             2015 Completed             [facility]



           ([facility]chicken            00:00:00                         [facility]



           [facility])                                                   [facility]

 

           Varicella             2015 Completed             [facility]



           ([facility]chicken            00:00:00                         [facility]



           [facility])                                                   [facility]

 

           Varicella             2015 Completed             [facility]



           ([facility]chicken            00:00:00                         [place] [facility]



           [facility])                                                   [facility]

 

           Varicella             2015 Completed             [facility]



           ([facility])(chicken            00:00:00                         [facility]



           [facility])                                                   [facility]

 

           Varicella             2015 Completed             [facility]



           ([facility])(chicken            00:00:00                         [place] [facility]



           [facility]ox)                                                   [facility]

 

           Varicella             2015 Completed             [facility]



           ([facility])(chicken            00:00:00                         [place] [facility]



           [facility])                                                   [facility]

 

           Varicella             2015 Completed             [facility]



           ([facility])(chicken            00:00:00                         [facility]



           [facility])                                                   [facility]

 

           Varicella             2015 Completed             [facility]



           ([facility]chicken            00:00:00                         [facility]



           [facility])                                                   [facility]

 

           Varicella             2015 Completed             [facility]



           ([facility]chicken            00:00:00                         [facility]



           [facility])                                                   [facility]

 

           Varicella             2015 Completed             [facility]



           ([facility]chicken            00:00:00                         [facility]



           [facility])                                                   [facility]

 

           Varicella             2015 Completed             [facility]



           ([facility]ax[facility]chicken            00:00:00                         [facility]



           [facility])                                                   [facility]

 

           Varicella             2015 Completed             [facility]



           ([facility])(chicken            00:00:00                         [facility]



           [facility])                                                   [facility]

 

           Varicella             2015 Completed             [facility]



           ([facility]chicken            00:00:00                         [facility]



           [facility])                                                   [facility]

 

           Varicella             2015 Completed             [facility]



           ([facility])(chicken            00:00:00                         [facility]



           [facility])                                                   [facility]

 

           Varicella             2015 Completed             [facility]



           ([facility]ax)(chicken            00:00:00                         [place] [facility]



           [facility])                                                   [facility]

 

           Varicella             2015 Completed             [facility]



           ([facility]ax)(chicken            00:00:00                         [facility]



           [facility])                                                   [facility]

 

           Varicella             2015 Completed             [facility]



           ([facility]ax)(chicken            00:00:00                         [facility]



           [facility])                                                   [facility]

 

           Varicella             2015 Completed             [facility]



           ([facility]chicken            00:00:00                         [place] [facility]



           [facility])                                                   [facility]

 

           Varicella             2015 Completed             [facility]



           ([facility]chicken            00:00:00                         [facility]



           [facility])                                                   [facility]

 

           Varicella             2015 Completed             [facility]



           ([facility]ax)(chicken            00:00:00                         [facility]



           [facility])                                                   [facility]

 

           Varicella             2015 Completed             [facility]



           ([facility]chicken            00:00:00                         [place] [facility]



           [facility])                                                   [facility]

 

           Varicella             2015 Completed             [facility]



           ([facility]chicken            00:00:00                         [facility]



           [facility])                                                   [facility]

 

           Varicella             2015 Completed             [facility]



           ([facility]ax)(chicken            00:00:00                         [place] [facility]



           [facility])                                                   [facility]

 

           Varicella             2015 Completed             [facility]



           ([facility]chicken            00:00:00                         [facility]



           [facility])                                                   [facility]

 

           Varicella             2015 Completed             [facility]



           ([facility]chicken            00:00:00                         [place] [facility]



           [facility])                                                   [facility]

 

           Varicella             2015 Completed             [facility]



           ([facility]chicken            00:00:00                         [place] [facility]



           [facility])                                                   [facility]

 

           Varicella             2015 Completed             [facility]



           ([facility]ax)(chicken            00:00:00                         [place] [facility]



           [facility])                                                   [facility]

 

           Varicella             2015 Completed             [facility]



           ([facility]ax)(chicken            00:00:00                         [place] [facility]



           [facility])                                                   [facility]

 

           Varicella             2015 Completed             [facility]



           ([facility]ax)(chicken            00:00:00                         [place] [facility]



           [facility])                                                   [facility]

 

           Varicella             2015 Completed             [facility]



           ([facility]ax)(chicken            00[facility]00:00                         [place] [facility]



           [facility])                                                   [facility]

 

           Varicella             2015 Completed             [facility]



           ([facility]ax)(chicken            00:00:00                         [place] [facility]



           [facility])                                                   [facility]

 

           Varicella             2015 Completed             [facility]



           ([facility]ivax)(chicken            00:00:00                         [place] [facility]



           [facility])                                                   [facility]

 

           Varicella             2015 Completed             [facility]



           ([facility])(chicken            00:00:00                         [place] [facility]



           [facility])                                                   [facility]

 

           TDAP                  2015 Completed             [facility]



                                 00:00:00                         [facility]



                                                                  Branch

 

           HPV                   2015 Completed             [facility]



                                 00:00:00                         [facility]



                                                                  Branch

 

           Varicella             2015 Completed             [facility]



           (varivax[facility]chicken            00:00:00                         [place] [facility]



           [facility])                                                   [facility]

 

           TDAP                  2015 Completed             [facility]



                                 00:00:00                         [facility]



                                                                  Branch

 

           HPV                   2015 Completed             [facility]



                                 00:00:00                         [facility]



                                                                  Branch

 

           Varicella             2015 Completed             [facility]



           ([facility]ax[facility]chicken            00:00:00                         [place] [facility]



           [facility])                                                   [facility]

 

           TDAP                  2015 Completed             [facility]



                                 00:00:00                         [facility]

 

           HPV                   2015 Completed             [facility]



                                 00:00:00                         [facility]

 

           Varicella             2015 Completed             [facility]



           (var[facility]ax[facility]chicken            00:00:00                         [place] [facility]



           [facility])                                                   [facility]

 

           TDAP                  2015 Completed             [facility]



                                 00:00:00                         [facility]

 

           HPV                   2015 Completed             [facility]



                                 00:00:00                         [facility]

 

           Varicella             2015 Completed             [facility]



           (var[facility]ax[facility]chicken            00:00:00                         [place] [facility]



           [facility])                                                   [facility]

 

           TDAP                  2015 Completed             [facility]



                                 00:00:00                         [facility]

 

           HPV                   2015 Completed             [facility]



                                 00:00:00                         [facility]

 

           Varicella             2015 Completed             [facility]



           (varivax[facility]chicken            00:00:00                         [place] [facility]



           [facility])                                                   [facility]

 

           TDAP                  2015 Completed             [facility]



                                 00:00:00                         [facility]

 

           HPV                   2015 Completed             [facility]



                                 00:00:00                         [facility]

 

           Varicella             2015 Completed             [facility]



           (varivax)(chicken            00:00:00                         [place] [facility]



           [facility])                                                   [facility]

 

           TDAP                  2015 Completed             [facility]



                                 00:00:00                         [facility]

 

           HPV                   2015 Completed             [facility]



                                 00:00:00                         [facility]

 

           Varicella             2015 Completed             [facility]



           (varivax)(chicken            00:00:00                         [place] [facility]



           [facility])                                                   [facility]

 

           TDAP                  2015 Completed             [facility]



                                 00:00:00                         [facility]

 

           HPV                   2015 Completed             [facility]



                                 00:00:00                         [facility]

 

           Varicella             2015 Completed             [facility]



           (varivax)(chicken            00:00:00                         [place] [facility]



           [facility])                                                   [facility]

 

           TDAP                  2015 Completed             [facility]



                                 00:00:00                         [facility]

 

           HPV                   2015 Completed             [facility]



                                 00:00:00                         [facility]

 

           Varicella             2015 Completed             [facility]



           (varivax)(chicken            00:00:00                         [place] [facility]



           [facility])                                                   [facility]

 

           TDAP                  2015 Completed             [facility]



                                 00:00:00                         [facility]

 

           HPV                   2015 Completed             [facility]



                                 00:00:00                         [facility]

 

           Varicella             2015 Completed             [facility]



           (varivax)(chicken            00:00:00                         [place] [facility]



           [facility])                                                   [facility]

 

           TDAP                  2015 Completed             [facility]



                                 00:00:00                         [facility]

 

           HPV                   2015 Completed             [facility]



                                 00:00:00                         [facility]

 

           Varicella             2015 Completed             [facility]



           (varivax)(chicken            00:00:00                         [place] [facility]



           [facility])                                                   [facility]

 

           TDAP                  2015 Completed             [facility]



                                 00:00:00                         [facility]

 

           HPV                   2015 Completed             [facility]



                                 00:00:00                         [facility]

 

           Varicella             2015 Completed             [facility]



           (varivax)(chicken            00:00:00                         [place] [facility]



           [facility])                                                   [facility]

 

           TDAP                  2015 Completed             [facility]



                                 00:00:00                         [facility]

 

           HPV                   2015 Completed             [facility]



                                 00:00:00                         [facility]

 

           Varicella             2015 Completed             [facility]



           (varivax)(chicken            00:00:00                         [place] [facility]



           [facility])                                                   [facility]

 

           TDAP                  2015 Completed             [facility]



                                 00:00:00                         [facility]

 

           HPV                   2015 Completed             [facility]



                                 00:00:00                         [facility]

 

           Varicella             2015 Completed             [facility]



           (varivax)(chicken            00:00:00                         [place] [facility]



           [facility])                                                   [facility]

 

           TDAP                  2015 Completed             [facility]



                                 00:00:00                         [facility]

 

           HPV                   2015 Completed             [facility]



                                 00:00:00                         [facility]

 

           Varicella             2015 Completed             [facility]



           (varivax)(chicken            00:00:00                         [place] [facility]



           [facility])                                                   [facility]

 

           TDAP                  2015 Completed             [facility]



                                 00:00:00                         [facility]

 

           HPV                   2015 Completed             [facility]



                                 00:00:00                         [facility]

 

           Varicella             2015 Completed             [facility]



           (varivax)(chicken            00:00:00                         [place] [facility]



           [facility]ox)                                                   [facility]

 

           TDAP                  2015 Completed             [facility]



                                 00:00:00                         [facility]

 

           HPV                   2015 Completed             [facility]



                                 00:00:00                         [facility]

 

           Varicella             2015 Completed             [facility]



           (varivax)(chicken            00:00:00                         [place] [facility]



           [facility]ox)                                                   [facility]

 

           TDAP                  2015 Completed             [facility]



                                 00:00:00                         [facility]

 

           HPV                   2015 Completed             [facility]



                                 00:00:00                         [facility]

 

           Varicella             2015 Completed             [facility]



           (varivax)(chicken            00:00:00                         [place] [facility]



           [facility]ox)                                                   [facility]

 

           TDAP                  2015 Completed             [facility]



                                 00:00:00                         [facility]

 

           HPV                   2015 Completed             [facility]



                                 00:00:00                         [facility]

 

           Varicella             2015 Completed             [facility]



           (varivax)(chicken            00:00:00                         [place] [facility]



           [facility])                                                   [facility]

 

           TDAP                  2015 Completed             [facility]



                                 00:00:00                         [facility]

 

           HPV                   2015 Completed             [facility]



                                 00:00:00                         [facility]

 

           Varicella             2015 Completed             [facility]



           (varivax)(chicken            00:00:00                         [place] [facility]



           [facility]ox)                                                   [facility]

 

           TDAP                  2015 Completed             [facility]



                                 00:00:00                         [facility]

 

           HPV                   2015 Completed             [facility]



                                 00:00:00                         [facility]

 

           Varicella             2015 Completed             [facility]



           (varivax)(chicken            00:00:00                         [place] [facility]



           pox)                                                   [facility]

 

           TDAP                  2015 Completed             [facility]



                                 00:00:00                         [facility]

 

           HPV                   2015 Completed             [facility]



                                 00:00:00                         [facility]

 

           Varicella             2015 Completed             [facility]



           (varivax)(chicken            00:00:00                         [facility]

[facility]



           pox)                                                   Branch

 

           TDAP                  2015 Completed             [facility]



                                 00:00:00                         [facility]

 

           HPV                   2015 Completed             [facility]



                                 00:00:00                         [facility]

 

           Varicella             2015 Completed             [facility]



           (varivax)(chicken            00:00:00                         [facility]

[facility]



           pox)                                                   Branch

 

           TDAP                  2015 Completed             [facility]



                                 00:00:00                         [facility]

 

           HPV                   2015 Completed             [facility]



                                 00:00:00                         [facility]

 

           Varicella             2015 Completed             [facility]



           (varivax)(chicken            00:00:00                         [facility]

[facility]



           pox)                                                   Branch

 

           TDAP                  2015 Completed             [facility]



                                 00:00:00                         [facility]

 

           HPV                   2015 Completed             [facility]



                                 00:00:00                         [facility]

 

           Varicella             2015 Completed             [facility]



           (varivax)(chicken            00:00:00                         [facility]



           pox)                                                   Branch

 

           TDAP                  2015 Completed             [facility]



                                 00:00:00                         [facility]

 

           HPV                   2015 Completed             [facility]



                                 00:00:00                         [facility]

 

           Varicella             2015 Completed             [facility]



           (varivax)(chicken            00:00:00                         [facility]



           pox)                                                   Branch

 

           TDAP                  2015 Completed             [facility]



                                 00:00:00                         [facility]

 

           HPV                   2015 Completed             [facility]



                                 00:00:00                         [facility]

 

           Varicella             2015 Completed             [facility]



           (varivax)(chicken            00:00:00                         [facility]



           pox)                                                   [facility]

 

           TDAP                  2015 Completed             [facility]



                                 00:00:00                         [facility]

 

           HPV                   2015 Completed             [facility]



                                 00:00:00                         [facility]

 

           Varicella             2015 Completed             [facility]



           (varivax)(chicken            00:00:00                         [facility]



           pox)                                                   Branch

 

           TDAP                  2015 Completed             [facility]



                                 00:00:00                         [facility]

 

           HPV                   2015 Completed             [facility]



                                 00:00:00                         [facility]

 

           Varicella             2015 Completed             [facility]



           (varivax)(chicken            00:00:00                         [facility]



           pox)                                                   [facility]

 

           TDAP                  2015 Completed             [facility]



                                 00:00:00                         [facility]

 

           HPV                   2015 Completed             [facility]



                                 00:00:00                         [facility]

 

           Varicella             2015 Completed             [facility]



           (varivax)(chicken            00:00:00                         [facility]



           pox)                                                   [facility]

 

           TDAP                  2015 Completed             [facility]



                                 00:00:00                         [facility]

 

           HPV                   2015 Completed             [facility]



                                 00:00:00                         [facility]

 

           Varicella             2015 Completed             [facility]



           (varivax)(chicken            00:00:00                         [facility]



           pox)                                                   [facility]

 

           TDAP                  2015 Completed             [facility]



                                 00:00:00                         [facility]

 

           HPV                   2015 Completed             [facility]



                                 00:00:00                         [facility]

 

           Varicella             2015 Completed             [facility]



           (varivax)(chicken            00:00:00                         [facility]



           pox)                                                   Br[facility]

 

           TDAP                  2015 Completed             [facility]



                                 00:00:00                         [facility]

 

           HPV                   2015 Completed             [facility]



                                 00:00:00                         [facility]

 

           Varicella             2015 Completed             [facility]



           (varivax)(chicken            00:00:00                         [facility]



           pox)                                                   Branch

 

           TDAP                  2015 Completed             [facility]



                                 00:00:00                         [facility]

 

           HPV                   2015 Completed             [facility]



                                 00:00:00                         [facility]

 

           Varicella             2015 Completed             [facility]



           (varivax)(chicken            00:00:00                         [facility]



           pox)                                                   [facility]

 

           TDAP                  2015 Completed             [facility]



                                 00:00:00                         [facility]

 

           HPV                   2015 Completed             [facility]



                                 00:00:00                         [facility]

 

           Varicella             2015 Completed             [facility]



           (varivax)(chicken            00:00:00                         [facility]



           pox)                                                   Br[facility]

 

           TDAP                  2015 Completed             [facility]



                                 00:00:00                         [facility]

 

           HPV                   2015 Completed             [facility]



                                 00:00:00                         [facility]

 

           Varicella             2015 Completed             [facility]



           (varivax)(chicken            00:00:00                         [facility]



           pox)                                                   Br[facility]

 

           TDAP                  2015 Completed             [facility]



                                 00:00:00                         [facility]

 

           HPV                   2015 Completed             [facility]



                                 00:00:00                         [facility]

 

           Varicella             2015 Completed             [facility]



           (varivax)(chicken            00:00:00                         [facility]



           pox)                                                   [facility]

 

           TDAP                  2015 Completed             [facility]



                                 00:00:00                         [facility]

 

           HPV                   2015 Completed             [facility]



                                 00:00:00                         [facility]

 

           Varicella             2015 Completed             [facility]



           (varivax)(chicken            00:00:00                         [place] [facility]



           pox)                                                   [facility]

 

           TDAP                  2015 Completed             [facility]



                                 00:00:00                         [facility]

 

           HPV                   2015 Completed             [facility]



                                 00:00:00                         [facility]

 

           Varicella             2015 Completed             [facility]



           (varivax)(chicken            00:00:00                         [place] [facility]



           pox)                                                   Br[facility]

 

           TDAP                  2015 Completed             [facility]



                                 00:00:00                         [facility]

 

           HPV                   2015 Completed             [facility]



                                 00:00:00                         [facility]

 

           Varicella             2015 Completed             [facility]



           (varivax)(chicken            00:00:00                         [place] [facility]



           pox)                                                   Branch

 

           TDAP                  2015 Completed             [facility]



                                 00:00:00                         [facility]

 

           HPV                   2015 Completed             [facility]



                                 00:00:00                         [facility]

 

           Varicella             2015 Completed             [facility]



           (varivax)(chicken            00:00:00                         [place] [facility]



           pox)                                                   [facility]

 

           TDAP                  2015 Completed             [facility]



                                 00:00:00                         [facility]

 

           HPV                   2015 Completed             [facility]



                                 00:00:00                         [facility]

 

           Varicella             2015 Completed             [facility]



           (varivax)(chicken            00:00:00                         [place] [facility]



           pox)                                                   Br[facility]

 

           TDAP                  2015 Completed             [facility]



                                 00:00:00                         [facility]

 

           HPV                   2015 Completed             [facility]



                                 00:00:00                         [facility]

 

           Varicella             2015 Completed             [facility]



           (varivax)(chicken            00:00:00                         [place] [facility]



           pox)                                                   Br[facility]

 

           TDAP                  2015 Completed             [facility]



                                 00:00:00                         [facility]

 

           HPV                   2015 Completed             [facility]



                                 00:00:00                         [facility]

 

           Varicella             2015 Completed             [facility]



           (varivax)(chicken            00:00:00                         [place] 

[facility]



           [facility])                                                   [facility]

 

           TDAP                  2015 Completed             [facility]



                                 00:00:00                         [facility]

 

           HPV                   2015 Completed             [facility]



                                 00:00:00                         [facility]

 

           Varicella             2015 Completed             [facility]



           (varivax)(chicken            00:00:00                         [facility]

edical



           pox)                                                   Branch

 

           TDAP                  2015 Completed             [facility]



                                 00:00:00                         [facility]

 

           HPV                   2015 Completed             [facility]



                                 00:00:00                         [facility]

 

           Varicella             2015 Completed             [facility]



           (varivax)(chicken            00:00:00                         [place] 

[facility]



           [facility]ox)                                                   [facility]

 

           TDAP                  2015 Completed             [facility]



                                 00:00:00                         [facility]

 

           HPV                   2015 Completed             [facility]



                                 00:00:00                         [facility]

 

           Varicella             2015 Completed             [facility]



           (varivax)(chicken            00:00:00                         [place] 

[facility]



           pox)                                                   [facility]

 

           TDAP                  2015 Completed             [facility]



                                 00:00:00                         [facility]

 

           HPV                   2015 Completed             [facility]



                                 00:00:00                         [facility]

 

           Varicella             2015 Completed             [facility]



           (varivax)(chicken            00:00:00                         [place] [facility]

[facility]



           pox)                                                   Branch

 

           TDAP                  2015 Completed             [facility]



                                 00:00:00                         [facility]

 

           HPV                   2015 Completed             [facility]



                                 00:00:00                         [facility]

 

           Varicella             2015 Completed             [facility]



           (varivax)(chicken            00:00:00                         [place] 

[facility]



           [facility]ox)                                                   [facility]

 

           TDAP                  2015 Completed             [facility]



                                 00:00:00                         [facility]

 

           HPV                   2015 Completed             [facility]



                                 00:00:00                         [facility]

 

           Varicella             2015 Completed             [facility]



           (varivax)(chicken            00:00:00                         [place] [facility]

[facility]



           pox)                                                   Branch

 

           TDAP                  2015 Completed             [facility]



                                 00:00:00                         [facility]

 

           HPV                   2015 Completed             [facility]



                                 00:00:00                         [facility]

 

           Varicella             2015 Completed             [facility]



           (varivax)(chicken            00:00:00                         [place] [facility]

[facility]



           pox)                                                   Branch

 

           TDAP                  2015 Completed             [facility]



                                 00:00:00                         [facility]

 

           HPV                   2015 Completed             [facility]



                                 00:00:00                         [facility]

 

           Varicella             2015 Completed             [facility]



           (varivax)(chicken            00:00:00                         [place] [facility]

[facility]



           pox)                                                   Branch

 

           TDAP                  2015 Completed             [facility]



                                 00:00:00                         [facility]

 

           HPV                   2015 Completed             [facility]



                                 00:00:00                         [facility]

 

           Varicella             2015 Completed             [facility]



           (varivax)(chicken            00:00:00                         [place] [facility]

[facility]



           pox)                                                   Branch

 

           TDAP                  2015 Completed             [facility]



                                 00:00:00                         [facility]

 

           HPV                   2015 Completed             [facility]



                                 00:00:00                         [facility]

 

           Varicella             2015 Completed             [facility]



           (varivax)(chicken            00:00:00                         [place] [facility]

[facility]



           pox)                                                   Branch

 

           TDAP                  2015 Completed             [facility]



                                 00:00:00                         [facility]

 

           HPV                   2015 Completed             [facility]



                                 00:00:00                         [facility]

 

           Varicella             2015 Completed             [facility]



           (varivax)(chicken            00:00:00                         [place] [facility]

[facility]



           pox)                                                   Branch

 

           TDAP                  2015 Completed             [facility]



                                 00:00:00                         [facility]

 

           HPV                   2015 Completed             [facility]



                                 00:00:00                         [facility]

 

           Varicella             2015 Completed             [facility]



           (varivax)(chicken            00:00:00                         [place] [facility]

[facility]



           pox)                                                   Branch

 

           TDAP                  2015 Completed             [facility]



                                 00:00:00                         [facility]

 

           HPV                   2015 Completed             [facility]



                                 00:00:00                         [facility]

 

           Influenza Virus            2014 Completed             [facility]

[facility]



           Vaccine Quad IM            00:00:00                         [facility]

[facility]



           Multi-dose 6+ MO                                             Branch

 

           Influenza Virus            2014 Completed             [facility]

[facility]



           Vaccine Quad IM            00:00:00                         [facility]

[facility]



           Multi-dose 6+ MO                                             Branch

 

           Influenza Virus            2014 Completed             [facility]

[facility]



           Vaccine Quad IM            00:00:00                         [facility]

[facility]



           Multi-dose 6+ MO                                             Branch

 

           Influenza Virus            2014 Completed             [facility]

[facility]



           Vaccine Quad IM            00:00:00                         [facility]

[facility]



           Multi-dose 6+ MO                                             Branch

 

           Influenza Virus            2014 Completed             [facility]

[facility]



           Vaccine Quad IM            00:00:00                         [facility]

[facility]



           Multi-dose 6+ MO                                             Branch

 

           Influenza Virus            2014 Completed             [facility]

[facility]



           Vaccine Quad IM            00:00:00                         [facility]

[facility]



           Multi-dose 6+ MO                                             Branch

 

           Influenza Virus            2014 Completed             [facility]

[facility]



           Vaccine Quad IM            00:00:00                         [facility]

[facility]



           Multi-dose 6+ MO                                             Branch

 

           Influenza Virus            2014 Completed             [facility]

[facility]



           Vaccine Quad IM            00:00:00                         [facility]

[facility]



           Multi-dose 6+ MO                                             Branch

 

           Influenza Virus            2014 Completed             [facility]

[facility]



           Vaccine Quad IM            00:00:00                         [facility]

[facility]



           Multi-dose 6+ MO                                             Branch

 

           Influenza Virus            2014 Completed             [facility]

[facility]



           Vaccine Quad IM            00:00:00                         [facility]

[facility]



           Multi-dose 6+ MO                                             Branch

 

           Influenza Virus            2014 Completed             [facility]



           [facility]            00:00:00                         [facility]

[facility]



           Multi-dose 6+ MO                                             [facility]

 

           Influenza Virus            2014 Completed             [facility]

[facility]



           [facility]            00:00:00                         [facility]

[facility]



           Multi-dose 6+ MO                                             [facility]

 

           Influenza Virus            2014 Completed             [facility]

[facility]



           [facility]            00:00:00                         [facility]

[facility]



           Multi-dose 6+ MO                                             [facility]

 

           Influenza Virus            2014 Completed             [facility]

[facility]



           [facility]            00:00:00                         [facility]

[facility]



           Multi-dose 6+ MO                                             [facility]

 

           Influenza Virus            2014 Completed             [facility]



           [facility]            00:00:00                         [facility]

[facility]



           Multi-dose 6+ MO                                             [facility]

 

           Influenza Virus            2014 Completed             [facility]

[facility]



           [facility]            00:00:00                         [facility]

[facility]



           Multi-dose 6+ MO                                             [facility]

 

           Influenza Virus            2014 Completed             [facility]

[facility]



           [facility]            00:00:00                         [facility]

[facility]



           Multi-dose 6+ MO                                             [facility]

 

           Influenza Virus            2014 Completed             [facility]

[facility]



           [facility]            00:00:00                         [facility]

[facility]



           Multi-dose 6+ MO                                             [facility]

 

           Influenza Virus            2014 Completed             [facility]

[facility]



           [facility]            00:00:00                         [facility]

[facility]



           Multi-dose 6+ MO                                             [facility]

 

           Influenza Virus            2014 Completed             [facility]

[facility]



           [facility]            00[facility]00:00                         [facility]

[facility]



           Multi-dose 6+ MO                                             [facility]

 

           Influenza Virus            2014 Completed             [facility]

[facility]



           [facility]            00:00:00                         [facility]

[facility]



           Multi-dose 6+ MO                                             [facility]

 

           Influenza Virus            2014 Completed             [facility]

[facility]



           [facility]            00:00:00                         [facility]

[facility]



           Multi-dose 6+ MO                                             [facility]

 

           Influenza Virus            2014 Completed             [facility]

[facility]



           [facility]            00:00:00                         [facility]

[facility]



           Multi-dose 6+ MO                                             [facility]

 

           Influenza Virus            2014 Completed             [facility]

[facility]



           [facility]            00:00:00                         [facility]

[facility]



           Multi-dose 6+ MO                                             [facility]

 

           Influenza Virus            2014 Completed             [facility]

[facility]



           [facility]            00:00:00                         [facility]

[facility]



           Multi-dose 6+ MO                                             [facility]

 

           Influenza Virus            2014 Completed             [facility]

[facility]



           [facility]            00:00:00                         [facility]

[facility]



           Multi-dose 6+ MO                                             [facility]

 

           Influenza Virus            2014 Completed             [facility]

[facility]



           [facility]            00:00:00                         [facility]

[facility]



           Multi-dose 6+ MO                                             [facility]

 

           Influenza Virus            2014 Completed             [facility]

[facility]



           [facility]            00:00:00                         [facility]

[facility]



           Multi-dose 6+ MO                                             [facility]

 

           Influenza Virus            2014 Completed             [facility]

[facility]



           [facility]            00:00:00                         [facility]

[facility]



           Multi-dose 6+ MO                                             [facility]

 

           Influenza Virus            2014 Completed             [facility]

[facility]



           [facility]            00:00:00                         [facility]

[facility]



           Multi-dose 6+ MO                                             [facility]

 

           Influenza Virus            2014 Completed             [facility]

[facility]



           [facility]            00:00:00                         [facility]

[facility]



           Multi-dose 6+ MO                                             [facility]

 

           Influenza Virus            2014 Completed             [facility]

[facility]



           [facility]            00:00:00                         [facility]

[facility]



           Multi-dose 6+ MO                                             [facility]

 

           Influenza Virus            2014 Completed             [facility]

[facility]



           [facility]            00:00:00                         [facility]

[facility]



           Multi-dose 6+ MO                                             [facility]

 

           Influenza Virus            2014 Completed             [facility]

[facility]



           [facility]            00:00:00                         [facility]

[facility]



           Multi-dose 6+ MO                                             [facility]

 

           Influenza Virus            2014 Completed             [facility]

[facility]



           [facility]            00:00:00                         [facility]

[facility]



           Multi-dose 6+ MO                                             [facility]

 

           Influenza Virus            2014 Completed             [facility]

[facility]



           [facility]            00:00:00                         [facility]

[facility]



           Multi-dose 6+ MO                                             [facility]

 

           Influenza Virus            2014 Completed             [facility]

[facility]



           [facility]            00:00:00                         [facility]

[facility]



           Multi-dose 6+ MO                                             [facility]

 

           Influenza Virus            2014 Completed             [facility]

[facility]



           [facility] IM            00:00:00                         [facility]

[facility]



           Multi-dose 6+ MO                                             [facility]

 

           Influenza Virus            2014 Completed             [facility]

[facility]



           [facility]            00:00:00                         [facility]

[facility]



           Multi-dose 6+ MO                                             [facility]

 

           Influenza Virus            2014 Completed             [facility]

[facility]



           [facility] IM            00:00:00                         [facility]

[facility]



           Multi-dose 6+ MO                                             [facility]

 

           Influenza Virus            2014 Completed             [facility]

[facility]



           [facility]            00:00:00                         [facility]

[facility]



           Multi-dose 6+ MO                                             [facility]

 

           Influenza Virus            2014 Completed             [facility]

[facility]



           [facility]            00:00:00                         [facility]

[facility]



           Multi-dose 6+ MO                                             [facility]

 

           Influenza Virus            2014 Completed             [facility]

[facility]



           [facility]            00:00:00                         [facility]

[facility]



           Multi-dose 6+ MO                                             [facility]

 

           Influenza Virus            2014 Completed             [facility]

[facility]



           [facility]            00:00:00                         [facility]

[facility]



           Multi-dose 6+ MO                                             [facility]

 

           Influenza Virus            2014 Completed             [facility]

[facility]



           [facility]            00:00:00                         [facility]

[facility]



           Multi-dose 6+ MO                                             [facility]

 

           Influenza Virus            2014 Completed             [facility]

[facility]



           [facility]            00:00:00                         [facility]

[facility]



           Multi-dose 6+ MO                                             [facility]

 

           Influenza Virus            2014 Completed             [facility]

[facility]



           [facility]            00:00:00                         [facility]

[facility]



           Multi-dose 6+ MO                                             [facility]

 

           Influenza Virus            2014 Completed             [facility]

[facility]



           [facility]            00:00:00                         [facility]

[facility]



           Multi-dose 6+ MO                                             [facility]

 

           Influenza Virus            2014 Completed             [facility]

[facility]



           [facility]            00:00:00                         [facility]

[facility]



           Multi-dose 6+ MO                                             [facility]

 

           Influenza Virus            2014 Completed             [facility]

[facility]



           [facility]            00:00:00                         [facility]

[facility]



           Multi-dose 6+ MO                                             [facility]

 

           Influenza Virus            2014 Completed             [facility]

[facility]



           [facility]            00:00:00                         [facility]

[facility]



           Multi-dose 6+ MO                                             [facility]

 

           Influenza Virus            2014 Completed             [facility]

[facility]



           [facility]            00:00:00                         [facility]

[facility]



           Multi-dose 6+ MO                                             [facility]

 

           Influenza Virus            2014 Completed             [facility]

[facility]



           [facility]            00:00:00                         [facility]

[facility]



           Multi-dose 6+ MO                                             [facility]

 

           Influenza Virus            2014 Completed             [facility]

[facility]



           [facility]            00:00:00                         [facility]

[facility]



           Multi-dose 6+ MO                                             [facility]

 

           Influenza Virus            2014 Completed             [facility]

[facility]



           [facility]            00:00:00                         [facility]

[facility]



           Multi-dose 6+ MO                                             [facility]

 

           Influenza Virus            2014 Completed             [facility]

[facility]



           [facility]            00:00:00                         [facility]

[facility]



           Multi-dose 6+ MO                                             [facility]

 

           Influenza Virus            2014 Completed             [facility]

[facility]



           [facility]            00:00:00                         [facility]

[facility]



           Multi-dose 6+ MO                                             [facility]

 

           Influenza Virus            2011 Completed             [facility]

[facility]



           [facility]            00:00:00                         [facility]

[facility]



           Multi-dose 6+ MO                                             [facility]

 

           Influenza Virus            2011 Completed             [facility]



           [facility]            00:00:00                         [facility]

[facility]



           Multi-dose 6+ MO                                             [facility]

 

           Influenza Virus            2011 Completed             [facility]

[facility]



           [facility]            00:00:00                         [facility]

[facility]



           Multi-dose 6+ MO                                             [facility]

 

           Influenza Virus            2011 Completed             [facility]

[facility]



           [facility]            00:00:00                         [facility]

[facility]



           Multi-dose 6+ MO                                             [facility]

 

           Influenza Virus            2011 Completed             [facility]

[facility]



           [facility]            00:00:00                         [facility]

[facility]



           Multi-dose 6+ MO                                             [facility]

 

           Influenza Virus            2011 Completed             [facility]

[facility]



           [facility]            00:00:00                         [facility]

[facility]



           Multi-dose 6+ MO                                             [facility]

 

           Influenza Virus            2011 Completed             [facility]

[facility]



           [facility]            00:00:00                         [facility]

[facility]



           Multi-dose 6+ MO                                             [facility]

 

           Influenza Virus            2011 Completed             [facility]

[facility]



           [facility]            00:00:00                         [facility]

[facility]



           Multi-dose 6+ MO                                             [facility]

 

           Influenza Virus            2011 Completed             [facility]

[facility]



           [facility]            00:00:00                         [facility]

[facility]



           Multi-dose 6+ MO                                             [facility]

 

           Influenza Virus            2011 Completed             [facility]

[facility]



           [facility]            00:00:00                         [facility]

[facility]



           Multi-dose 6+ MO                                             [facility]

 

           Influenza Virus            2011 Completed             [facility]

[facility]



           [facility] IM            00:00:00                         [facility]

[facility]



           Multi-dose 6+ MO                                             [facility]

 

           Influenza Virus            2011 Completed             [facility]

[facility]



           [facility] IM            00:00:00                         [facility]

[facility]



           Multi-dose 6+ MO                                             [facility]

 

           Influenza Virus            2011 Completed             [facility]

[facility]



           [facility] IM            00:00:00                         [facility]

[facility]



           Multi-dose 6+ MO                                             [facility]

 

           Influenza Virus            2011 Completed             [facility]

[facility]



           [facility] IM            00:00:00                         [facility]

[facility]



           Multi-dose 6+ MO                                             [facility]

 

           Influenza Virus            2011 Completed             [facility]

[facility]



           [facility] IM            00:00:00                         [facility]

[facility]



           Multi-dose 6+ MO                                             [facility]

 

           Influenza Virus            2011 Completed             [facility]

[facility]



           [facility] IM            00:00:00                         [facility]

[facility]



           Multi-dose 6+ MO                                             [facility]

 

           Influenza Virus            2011 Completed             [facility]

[facility]



           [facility] IM            00:00:00                         [facility]

[facility]



           Multi-dose 6+ MO                                             [facility]

 

           Influenza Virus            2011 Completed             [facility]

[facility]



           [facility] IM            00:00:00                         [facility]

[facility]



           Multi-dose 6+ MO                                             [facility]

 

           Influenza Virus            2011 Completed             [facility]

[facility]



           [facility]            00:00:00                         [facility]

[facility]



           Multi-dose 6+ MO                                             [facility]

 

           Influenza Virus            2011 Completed             [facility]

[facility]



           [facility] IM            00:00:00                         [facility]

[facility]



           Multi-dose 6+ MO                                             [facility]

 

           Influenza Virus            2011 Completed             [facility]

[facility]



           [facility] IM            00:00:00                         [facility]

[facility]



           Multi-dose 6+ MO                                             [facility]

 

           Influenza Virus            2011 Completed             [facility]

[facility]



           [facility] IM            00:00:00                         [facility]

[facility]



           Multi-dose 6+ MO                                             [facility]

 

           Influenza Virus            2011 Completed             [facility]

[facility]



           [facility]            00:00:00                         [facility]

[facility]



           Multi-dose 6+ MO                                             [facility]

 

           Influenza Virus            2011 Completed             [facility]

[facility]



           [facility]            00:00:00                         [facility]

[facility]



           Multi-dose 6+ MO                                             [facility]

 

           Influenza Virus            2011 Completed             [facility]

[facility]



           [facility]            00:00:00                         [facility]

[facility]



           Multi-dose 6+ MO                                             [facility]

 

           Influenza Virus            2011 Completed             [facility]

[facility]



           [facility] IM            00:00:00                         [facility]

[facility]



           Multi-dose 6+ MO                                             [facility]

 

           Influenza Virus            2011 Completed             [facility]

[facility]



           [facility] IM            00:00:00                         [facility]

[facility]



           Multi-dose 6+ MO                                             [facility]

 

           Influenza Virus            2011 Completed             [facility]

[facility]



           [facility] IM            00:00:00                         [facility]

[facility]



           Multi-dose 6+ MO                                             [facility]

 

           Influenza Virus            2011 Completed             [facility]

[facility]



           [facility] IM            00:00:00                         [facility]

[facility]



           Multi-dose 6+ MO                                             [facility]

 

           Influenza Virus            2011 Completed             [facility]

[facility]



           [facility] IM            00:00:00                         [facility]

[facility]



           Multi-dose 6+ MO                                             [facility]

 

           Influenza Virus            2011 Completed             [facility]

[facility]



           [facility] IM            00:00:00                         [facility]

[facility]



           Multi-dose 6+ MO                                             [facility]

 

           Influenza Virus            2011 Completed             [facility]

[facility]



           [facility] IM            00:00:00                         [facility]

[facility]



           Multi-dose 6+ MO                                             [facility]

 

           Influenza Virus            2011 Completed             [facility]

[facility]



           [facility] IM            00:00:00                         [facility]

[facility]



           Multi-dose 6+ MO                                             [facility]

 

           Influenza Virus            2011 Completed             [facility]

[facility]



           [facility]            00:00:00                         [facility]

[facility]



           Multi-dose 6+ MO                                             [facility]

 

           Influenza Virus            2011 Completed             [facility]

[facility]



           [facility] IM            00:00:00                         [facility]

[facility]



           Multi-dose 6+ MO                                             [facility]

 

           Influenza Virus            2011 Completed             [facility]

[facility]



           [facility] IM            00:00:00                         [facility]

[facility]



           Multi-dose 6+ MO                                             [facility]

 

           Influenza Virus            2011 Completed             [facility]

[facility]



           [facility] IM            00:00:00                         [facility]

[facility]



           Multi-dose 6+ MO                                             [facility]

 

           Influenza Virus            2011 Completed             [facility]

[facility]



           [facility] IM            00:00:00                         [facility]

[facility]



           Multi-dose 6+ MO                                             [facility]

 

           Influenza Virus            2011 Completed             [facility]

[facility]



           [facility] IM            00:00:00                         [facility]

[facility]



           Multi-dose 6+ MO                                             [facility]

 

           Influenza Virus            2011 Completed             [facility]

[facility]



           [facility] IM            00:00:00                         [facility]

[facility]



           Multi-dose 6+ MO                                             [facility]

 

           Influenza Virus            2011 Completed             [facility]

[facility]



           [facility] IM            00:00:00                         [facility]

[facility]



           Multi-dose 6+ MO                                             [facility]

 

           Influenza Virus            2011 Completed             [facility]

[facility]



           [facility] IM            00:00:00                         [facility]

[facility]



           Multi-dose 6+ MO                                             [facility]

 

           Influenza Virus            2011 Completed             [facility]

[facility]



           [facility] IM            00:00:00                         [facility]

[facility]



           Multi-dose 6+ MO                                             [facility]

 

           Influenza Virus            2011 Completed             [facility]

[facility]



           [facility] IM            00:00:00                         [facility]

[facility]



           Multi-dose 6+ MO                                             [facility]

 

           Influenza Virus            2011 Completed             [facility]

[facility]



           [facility] IM            00:00:00                         [facility]

[facility]



           Multi-dose 6+ MO                                             [facility]

 

           Influenza Virus            2011 Completed             [facility]

[facility]



           [facility] IM            00:00:00                         [facility]

[facility]



           Multi-dose 6+ MO                                             [facility]

 

           Influenza Virus            2011 Completed             [facility]

[facility]



           [facility] IM            00:00:00                         [facility]

[facility]



           Multi-dose 6+ MO                                             [facility]

 

           Influenza Virus            2011 Completed             [facility]

[facility]



           [facility]            00:00:00                         [facility]

[facility]



           Multi-dose 6+ MO                                             [facility]

 

           Influenza Virus            2011 Completed             [facility]



           [facility]            00:00:00                         [facility]

[facility]



           Multi-dose 6+ MO                                             [facility]

 

           Influenza Virus            2011 Completed             [facility]

[facility]



           [facility]            00:00:00                         [facility]

[facility]



           Multi-dose 6+ MO                                             [facility]

 

           Influenza Virus            2011 Completed             [facility]

[facility]



           [facility]            00:00:00                         [facility]

[facility]



           Multi-dose 6+ MO                                             [facility]

 

           Influenza Virus            2011 Completed             [facility]

[facility]



           [facility]            00:00:00                         [facility]

[facility]



           Multi-dose 6+ MO                                             [facility]

 

           Influenza Virus            2011 Completed             [facility]

[facility]



           [facility]            00:00:00                         [facility]

[facility]



           Multi-dose 6+ MO                                             [facility]

 

           Influenza Virus            2011 Completed             [facility]

[facility]



           [facility]            00:00:00                         [facility]

[facility]



           Multi-dose 6+ MO                                             [facility]

 

           Influenza Virus            2011 Completed             [facility]

[facility]



           [facility] IM            00:00:00                         [facility]

[facility]



           Multi-dose 6+ MO                                             [facility]

 

           Influenza Virus            2011 Completed             [facility]

[facility]



           [facility]            00:00:00                         [facility]

[facility]



           Multi-dose 6+ MO                                             [facility]

 

           Influenza Virus            2011 Completed             [facility]

[facility]



           [facility] IM            00:00:00                         [facility]

[facility]



           Multi-dose 6+ MO                                             [facility]

 

           Influenza Virus            2010 Completed             [facility]

[facility]



           [facility]            00:00:00                         [facility]

[facility]



           Multi-dose 6+ MO                                             [facility]

 

           Influenza Virus            2010 Completed             [facility]

[facility]



           [facility]            00:00:00                         [facility]

[facility]



           Multi-dose 6+ MO                                             [facility]

 

           Influenza Virus            2010 Completed             [facility]

[facility]



           [facility]            00:00:00                         [facility]

[facility]



           Multi-dose 6+ MO                                             [facility]

 

           Influenza Virus            2010 Completed             [facility]

[facility]



           [facility] IM            00:00:00                         [facility]

[facility]



           Multi-dose 6+ MO                                             [facility]

 

           Influenza Virus            2010 Completed             [facility]

[facility]



           [facility] IM            00:00:00                         [facility]

[facility]



           Multi-dose 6+ MO                                             [facility]

 

           Influenza Virus            2010 Completed             [facility]

[facility]



           [facility] IM            00:00:00                         [facility]

[facility]



           Multi-dose 6+ MO                                             [facility]

 

           Influenza Virus            2010 Completed             [facility]

[facility]



           [facility] IM            00:00:00                         [facility]

[facility]



           Multi-dose 6+ MO                                             [facility]

 

           Influenza Virus            2010 Completed             [facility]

[facility]



           [facility] IM            00:00:00                         [facility]

[facility]



           Multi-dose 6+ MO                                             [facility]

 

           Influenza Virus            2010 Completed             [facility]

[facility]



           [facility] IM            00:00:00                         [facility]

[facility]



           Multi-dose 6+ MO                                             [facility]

 

           Influenza Virus            2010 Completed             [facility]

[facility]



           [facility] IM            00:00:00                         [facility]

[facility]



           Multi-dose 6+ MO                                             [facility]

 

           Influenza Virus            2010 Completed             [facility]

[facility]



           [facility] IM            00:00:00                         [facility]

[facility]



           Multi-dose 6+ MO                                             [facility]

 

           Influenza Virus            2010 Completed             [facility]

[facility]



           [facility] IM            00:00:00                         [facility]

[facility]



           Multi-dose 6+ MO                                             [facility]

 

           Influenza Virus            2010 Completed             [facility]

[facility]



           [facility] IM            00:00:00                         [facility]

[facility]



           Multi-dose 6+ MO                                             [facility]

 

           Influenza Virus            2010 Completed             [facility]

[facility]



           [facility] IM            00:00:00                         [facility]

[facility]



           Multi-dose 6+ MO                                             [facility]

 

           Influenza Virus            2010 Completed             [facility]

[facility]



           [facility] IM            00:00:00                         [facility]

[facility]



           Multi-dose 6+ MO                                             [facility]

 

           Influenza Virus            2010 Completed             [facility]

[facility]



           [facility] IM            00:00:00                         [facility]

[facility]



           Multi-dose 6+ MO                                             [facility]

 

           Influenza Virus            2010 Completed             [facility]

[facility]



           [facility] IM            00:00:00                         [facility]

[facility]



           Multi-dose 6+ MO                                             [facility]

 

           Influenza Virus            2010 Completed             [facility]

[facility]



           [facility] IM            00:00:00                         [facility]

[facility]



           Multi-dose 6+ MO                                             [facility]

 

           Influenza Virus            2010 Completed             [facility]

[facility]



           [facility] IM            00:00:00                         [facility]

[facility]



           Multi-dose 6+ MO                                             [facility]

 

           Influenza Virus            2010 Completed             [facility]

[facility]



           [facility] IM            00:00:00                         [facility]

[facility]



           Multi-dose 6+ MO                                             [facility]

 

           Influenza Virus            2010 Completed             [facility]

[facility]



           [facility] IM            00:00:00                         [facility]

[facility]



           Multi-dose 6+ MO                                             [facility]

 

           Influenza Virus            2010 Completed             [facility]

[facility]



           [facility] IM            00:00:00                         [facility]

[facility]



           Multi-dose 6+ MO                                             [facility]

 

           Influenza Virus            2010 Completed             [facility]

[facility]



           [facility] IM            00:00:00                         [facility]

[facility]



           Multi-dose 6+ MO                                             [facility]

 

           Influenza Virus            2010 Completed             [facility]

[facility]



           [facility] IM            00:00:00                         [facility]

[facility]



           Multi-dose 6+ MO                                             [facility]

 

           Influenza Virus            2010 Completed             [facility]

[facility]



           [facility] IM            00:00:00                         [facility]

[facility]



           Multi-dose 6+ MO                                             [facility]

 

           Influenza Virus            2010 Completed             [facility]

[facility]



           [facility] IM            00:00:00                         [facility]

[facility]



           Multi-dose 6+ MO                                             [facility]

 

           Influenza Virus            2010 Completed             [facility]

[facility]



           [facility] IM            00:00:00                         [facility]

[facility]



           Multi-dose 6+ MO                                             [facility]

 

           Influenza Virus            2010 Completed             [facility]

[facility]



           [facility] IM            00:00:00                         [facility]

[facility]



           Multi-dose 6+ MO                                             [facility]

 

           Influenza Virus            2010 Completed             [facility]

[facility]



           [facility] IM            00:00:00                         [facility]

[facility]



           Multi-dose 6+ MO                                             [facility]

 

           Influenza Virus            2010 Completed             [facility]

[facility]



           [facility] IM            00:00:00                         [facility]

[facility]



           Multi-dose 6+ MO                                             [facility]

 

           Influenza Virus            2010 Completed             [facility]

[facility]



           [facility] IM            00:00:00                         [facility]

[facility]



           Multi-dose 6+ MO                                             [facility]

 

           Influenza Virus            2010 Completed             [facility]

[facility]



           [facility] IM            00:00:00                         [facility]

[facility]



           Multi-dose 6+ MO                                             [facility]

 

           Influenza Virus            2010 Completed             [facility]

[facility]



           [facility] IM            00:00:00                         [facility]

[facility]



           Multi-dose 6+ MO                                             [facility]

 

           Influenza Virus            2010 Completed             [facility]

[facility]



           [facility] IM            00:00:00                         [facility]

[facility]



           Multi-dose 6+ MO                                             [facility]

 

           Influenza Virus            2010 Completed             [facility]

[facility]



           [facility] IM            00:00:00                         [facility]

[facility]



           Multi-dose 6+ MO                                             [facility]

 

           Influenza Virus            2010 Completed             [facility]

[facility]



           [facility] IM            00:00:00                         [facility]

[facility]



           Multi-dose 6+ MO                                             [facility]

 

           Influenza Virus            2010 Completed             [facility]

[facility]



           [facility] IM            00:00:00                         [facility]

[facility]



           Multi-dose 6+ MO                                             [facility]

 

           Influenza Virus            2010 Completed             [facility]

[facility]



           [facility] IM            00:00:00                         [facility]

[facility]



           Multi-dose 6+ MO                                             [facility]

 

           Influenza Virus            2010 Completed             [facility]

[facility]



           [facility] IM            00:00:00                         [facility]

[facility]



           Multi-dose 6+ MO                                             [facility]

 

           Influenza Virus            2010 Completed             [facility]

[facility]



           [facility] IM            00:00:00                         [facility]

[facility]



           Multi-dose 6+ MO                                             [facility]

 

           Influenza Virus            2010 Completed             [facility]

[facility]



           [facility] IM            00:00:00                         [facility]

[facility]



           Multi-dose 6+ MO                                             [facility]

 

           Influenza Virus            2010 Completed             [facility]

[facility]



           [facility] IM            00:00:00                         [facility]

[facility]



           Multi-dose 6+ MO                                             [facility]

 

           Influenza Virus            2010 Completed             [facility]

[facility]



           [facility] IM            00:00:00                         [facility]

[facility]



           Multi-dose 6+ MO                                             [facility]

 

           Influenza Virus            2010 Completed             [facility]

[facility]



           [facility] IM            00:00:00                         [facility]

[facility]



           Multi-dose 6+ MO                                             [facility]

 

           Influenza Virus            2010 Completed             [facility]

[facility]



           [facility] IM            00:00:00                         [facility]

[facility]



           Multi-dose 6+ MO                                             [facility]

 

           Influenza Virus            2010 Completed             [facility]

[facility]



           [facility] IM            00:00:00                         [facility]

[facility]



           Multi-dose 6+ MO                                             [facility]

 

           Influenza Virus            2010 Completed             [facility]

[facility]



           [facility] IM            00:00:00                         [facility]

[facility]



           Multi-dose 6+ MO                                             [facility]

 

           Influenza Virus            2010 Completed             [facility]

[facility]



           [facility] IM            00:00:00                         [facility]

[facility]



           Multi-dose 6+ MO                                             [facility]

 

           Influenza Virus            2010 Completed             [facility]

[facility]



           [facility] IM            00:00:00                         [facility]

[facility]



           Multi-dose 6+ MO                                             [facility]

 

           Influenza Virus            2010 Completed             [facility]

[facility]



           [facility] IM            00:00:00                         [facility]

[facility]



           Multi-dose 6+ MO                                             [facility]

 

           Influenza Virus            2010 Completed             [facility]

[facility]



           [facility] IM            00:00:00                         [facility]

[facility]



           Multi-dose 6+ MO                                             [facility]

 

           Influenza Virus            2010 Completed             [facility]

[facility]



           [facility] IM            00:00:00                         [facility]

[facility]



           Multi-dose 6+ MO                                             [facility]

 

           Influenza Virus            2010 Completed             [facility]

[facility]



           [facility] IM            00:00:00                         [facility]

[facility]



           Multi-dose 6+ MO                                             [facility]

 

           Influenza Virus            2010 Completed             [facility]

[facility]



           [facility] IM            00:00:00                         [facility]

[facility]



           Multi-dose 6+ MO                                             [facility]

 

           Influenza Virus            2010 Completed             [facility]

[facility]



           [facility] IM            00:00:00                         [facility]

[facility]



           Multi-dose 6+ MO                                             [facility]

 

           Influenza Virus            2010 Completed             [facility]

[facility]



           [facility] IM            00:00:00                         [facility]

[facility]



           Multi-dose 6+ MO                                             [facility]

 

           Influenza Virus            2010 Completed             [facility]

[facility]



           [facility] IM            00:00:00                         [facility]

[facility]



           Multi-dose 6+ MO                                             [facility]

 

           HPV                   2007 Completed             [facility]



                                 00:00:00                         [facility]



                                                                  Br[facility]

 

           HPV                   2007 Completed             [facility]



                                 00:00:00                         [facility]



                                                                  Branch

 

           HPV                   2007 Completed             [facility]



                                 00:00:00                         [facility]



                                                                  Branch

 

           HPV                   2007 Completed             [facility]



                                 00:00:00                         [facility]



                                                                  [facility]

 

           HPV                   2007 Completed             [facility]



                                 00:00:00                         [facility]



                                                                  [facility]

 

           HPV                   2007 Completed             [facility]



                                 00:00:00                         [facility]



                                                                  Br[facility]

 

           HPV                   2007 Completed             [facility]



                                 00:00:00                         [facility]



                                                                  [facility]

 

           HPV                   2007 Completed             [facility]



                                 00:00:00                         [facility]



                                                                  [facility]

 

           HPV                   2007 Completed             [facility]



                                 00:00:00                         [facility]



                                                                  [facility]

 

           HPV                   2007 Completed             [facility]



                                 00:00:00                         [facility]



                                                                  [facility]

 

           HPV                   2007 Completed             [facility]



                                 00:00:00                         [facility]



                                                                  [facility]

 

           HPV                   2007 Completed             [facility]



                                 00:00:00                         [facility]



                                                                  [facility]

 

           HPV                   2007 Completed             [facility]



                                 00:00:00                         [facility]



                                                                  [facility]

 

           HPV                   2007 Completed             [facility]



                                 00:00:00                         [facility]



                                                                  [facility]

 

           HPV                   2007 Completed             [facility]



                                 00:00:00                         [facility]



                                                                  Branch

 

           HPV                   2007 Completed             [facility]



                                 00:00:00                         [facility]



                                                                  Branch

 

           HPV                   2007 Completed             [facility]



                                 00:00:00                         [facility]



                                                                  [facility]

 

           HPV                   2007 Completed             [facility]



                                 00:00:00                         [facility]



                                                                  [facility]

 

           HPV                   2007 Completed             [facility]



                                 00:00:00                         [facility]



                                                                  [facility]

 

           HPV                   2007 Completed             [facility]



                                 00:00:00                         [facility]



                                                                  [facility]

 

           HPV                   2007 Completed             [facility]



                                 00:00:00                         [facility]



                                                                  [facility]

 

           HPV                   2007 Completed             [facility]



                                 00:00:00                         [facility]



                                                                  [facility]

 

           HPV                   2007 Completed             [facility]



                                 00:00:00                         [facility]



                                                                  [facility]

 

           HPV                   2007 Completed             [facility]



                                 00:00:00                         [facility]



                                                                  [facility]

 

           HPV                   2007 Completed             [facility]



                                 00:00:00                         [facility]



                                                                  [facility]

 

           HPV                   2007 Completed             [facility]



                                 00:00:00                         [facility]



                                                                  [facility]

 

           HPV                   2007 Completed             [facility]



                                 00:00:00                         [facility]



                                                                  [facility]

 

           HPV                   2007 Completed             [facility]



                                 00:00:00                         [facility]



                                                                  [facility]

 

           HPV                   2007 Completed             [facility]



                                 00:00:00                         [facility]



                                                                  [facility]

 

           HPV                   2007 Completed             [facility]



                                 00:00:00                         [facility]



                                                                  [facility]

 

           HPV                   2007 Completed             [facility]



                                 00:00:00                         [facility]



                                                                  [facility]

 

           HPV                   2007 Completed             [facility]



                                 00:00:00                         [facility]



                                                                  [facility]

 

           HPV                   2007 Completed             [facility]



                                 00:00[facility]00                         [facility]



                                                                  [facility]

 

           HPV                   2007 Completed             [facility]



                                 00:00[facility]00                         [facility]



                                                                  [facility]

 

           HPV                   2007 Completed             [facility]



                                 00:00[facility]00                         [facility]



                                                                  [facility]

 

           HPV                   2007 Completed             [facility]



                                 00:00[facility]00                         [facility]



                                                                  [facility]

 

           HPV                   2007 Completed             [facility]



                                 00:00[facility]00                         [facility]



                                                                  [facility]

 

           HPV                   2007 Completed             [facility]



                                 00:00[facility]00                         [facility]



                                                                  [facility]

 

           HPV                   2007 Completed             [facility]



                                 00:00[facility]00                         [facility]



                                                                  [facility]

 

           HPV                   2007 Completed             [facility]



                                 00:00[facility]00                         [facility]



                                                                  [facility]

 

           HPV                   2007 Completed             [facility]



                                 00:00[facility]00                         [facility]



                                                                  [facility]

 

           HPV                   2007 Completed             [facility]



                                 00:00[facility]00                         [facility]



                                                                  [facility]

 

           HPV                   2007 Completed             [facility]



                                 00:00[facility]00                         [facility]



                                                                  [facility]

 

           HPV                   2007 Completed             [facility]



                                 00:00[facility]00                         [facility]



                                                                  [facility]

 

           HPV                   2007 Completed             [facility]



                                 00:00[facility]00                         [facility]



                                                                  [facility]

 

           HPV                   2007 Completed             [facility]



                                 00:00:00                         [facility]



                                                                  [facility]

 

           HPV                   2007 Completed             [facility]



                                 00:00[facility]00                         [facility]



                                                                  [facility]

 

           HPV                   2007 Completed             [facility]



                                 00:00[facility]00                         [facility]



                                                                  [facility]

 

           HPV                   2007 Completed             [facility]



                                 00:00:00                         [facility]



                                                                  [facility]

 

           HPV                   2007 Completed             [facility]



                                 00[facility]00:00                         [facility]



                                                                  [facility]

 

           HPV                   2007 Completed             [facility]



                                 00[facility]00:00                         [facility]



                                                                  [facility]

 

           HPV                   2007 Completed             [facility]



                                 00[facility]00:00                         [facility]



                                                                  [facility]

 

           HPV                   2007 Completed             [facility]



                                 00:00:00                         [facility]



                                                                  [facility]

 

           HPV                   2007 Completed             [facility]



                                 00:00:00                         [facility]



                                                                  [facility]

 

           HPV                   2007 Completed             [facility]



                                 00:00[facility]00                         [facility]



                                                                  [facility]

 

           HPV                   2007 Completed             [facility]



                                 00:00:00                         [facility]



                                                                  [facility]

 

           HPV                   2007 Completed             [facility]



                                 00:00:00                         [facility]



                                                                  [facility]

 

           HPV                   2007 Completed             [facility]



                                 00:00:00                         [facility]



                                                                  [facility]

 

           HPV                   2007 Completed             [facility]



                                 00:00:00                         [facility]



                                                                  [facility]

 

           HPV                   2007 Completed             [facility]



                                 00:00:00                         [facility]



                                                                  [facility]

 

           HPV                   2007 Completed             [facility]



                                 00:00:00                         [facility]



                                                                  [facility]

 

           HPV                   2007 Completed             [facility]



                                 00:00:00                         [facility]



                                                                  [facility]

 

           HPV                   2007 Completed             [facility]



                                 00:00:00                         [facility]



                                                                  [facility]

 

           HPV                   2007 Completed             [facility]



                                 00:00:00                         [facility]



                                                                  [facility]

 

           HPV                   2007 Completed             [facility]



                                 00:00[facility]00                         [facility]



                                                                  [facility]

 

           HPV                   2007 Completed             [facility]



                                 00:00:00                         [facility]



                                                                  [facility]

 

           HPV                   2007 Completed             [facility]



                                 00:00:00                         [facility]



                                                                  [facility]

 

           HPV                   2007 Completed             [facility]



                                 00[facility]00:00                         [facility]

 

           HPV                   2007 Completed             [facility]



                                 00:00:00                         [facility]

 

           HPV                   2007 Completed             [facility]



                                 00:00:00                         [facility]



                                                                  [facility]

 

           HPV                   2007 Completed             [facility]



                                 00[facility]00:00                         [facility]



                                                                  [facility]

 

           HPV                   2007 Completed             [facility]



                                 00:00:00                         [facility]



                                                                  [facility]

 

           HPV                   2007 Completed             [facility]



                                 00[facility]00:00                         [facility]

 

           HPV                   2007 Completed             [facility]



                                 00:00:00                         [facility]

 

           HPV                   2007 Completed             [facility]



                                 00:00:00                         [facility]

 

           HPV                   2007 Completed             [facility]



                                 00:00:00                         [facility]

 

           HPV                   2007 Completed             [facility]



                                 00[facility]00:00                         [facility]

 

           HPV                   2007 Completed             [facility]



                                 00:00:00                         [facility]

 

           HPV                   2007 Completed             [facility]



                                 00:00:00                         [facility]

 

           HPV                   2007 Completed             [facility]



                                 00:00:00                         [facility]

 

           HPV                   2007 Completed             [facility]



                                 00:00:00                         [facility]

 

           HPV                   2007 Completed             [facility]



                                 00:00:00                         [facility]

 

           HPV                   2007 Completed             [facility]



                                 00:00:00                         [facility]

 

           HPV                   2007 Completed             [facility]



                                 00[facility]00:00                         [facility]

 

           HPV                   2007 Completed             [facility]



                                 00[facility]00:00                         [facility]

 

           HPV                   2007 Completed             [facility]



                                 00:00:00                         [facility]

 

           HPV                   2007 Completed             [facility]



                                 00[facility]00:00                         [facility]

 

           HPV                   2007 Completed             [facility]



                                 00:00:00                         [facility]

 

           HPV                   2007 Completed             [facility]



                                 00:00:00                         [facility]

 

           HPV                   2007 Completed             [facility]



                                 00:00:00                         [facility]

 

           HPV                   2007 Completed             [facility]



                                 00:00:00                         [facility]

 

           HPV                   2007 Completed             [facility]



                                 00:00:00                         [facility]

 

           HPV                   2007 Completed             [facility]



                                 00[facility]00:00                         [facility]

 

           HPV                   2007 Completed             [facility]



                                 00:00[facility]00                         [facility]

 

           HPV                   2007 Completed             [facility]



                                 00:00:00                         [facility]

 

           HPV                   2007 Completed             [facility]



                                 00:00:00                         [facility]

 

           HPV                   2007 Completed             [facility]



                                 00:00:00                         [facility]

 

           HPV                   2007 Completed             [facility]



                                 00:00:00                         [facility]

 

           HPV                   2007 Completed             [facility]



                                 00:00:00                         [facility]

 

           HPV                   2007 Completed             [facility]



                                 00:00:00                         [facility]

 

           HPV                   2007 Completed             [facility]



                                 00:00:00                         [facility]

 

           HPV                   2007 Completed             [facility]



                                 00:00:00                         [facility]

 

           HPV                   2007 Completed             [facility]



                                 00:00:00                         [facility]

 

           HPV                   2007 Completed             [facility]



                                 00:00:00                         [facility]

 

           HPV                   2007 Completed             [facility]



                                 00:00:00                         [facility]

 

           HPV                   2007 Completed             [facility]



                                 00:00:00                         [facility]

 

           HPV                   2007 Completed             [facility]



                                 00:00:00                         [facility]

 

           HPV                   2007 Completed             [facility]



                                 00:00:00                         [facility]

 

           HPV                   2007 Completed             [facility]



                                 00:00:00                         [facility]

 

           HPV                   2007 Completed             [facility]



                                 00:00:00                         [facility]

 

           HPV                   2007 Completed             [facility]



                                 00:00:00                         [facility]

 

           HPV                   2007 Completed             [facility]



                                 00:00:00                         [facility]

 

           HPV                   2007 Completed             [facility]



                                 00:00:00                         [facility]

 

           HPV                   2007 Completed             [facility]



                                 00:00:00                         [facility]

 

           TDAP                  2006 Completed             [facility]



                                 00:00:00                         [facility]



                                                                  [facility]

 

           TDAP                  2006 Completed             [facility]



                                 00:00:00                         [facility]

 

           TDAP                  2006 Completed             [facility]



                                 00[facility]00:00                         [facility]



                                                                  [facility]

 

           TDAP                  2006 Completed             [facility]



                                 00:00:00                         [facility]

 

           TDAP                  2006 Completed             [facility]



                                 00:00:00                         [facility]

 

           TDAP                  2006 Completed             [facility]



                                 00:00:00                         [facility]

 

           TDAP                  2006 Completed             [facility]



                                 00[facility]00:00                         [facility]



                                                                  [facility]

 

           TDAP                  2006 Completed             [facility]



                                 00:00:00                         [facility]

 

           TDAP                  2006 Completed             [facility]



                                 00:00:00                         [facility]

 

           TDAP                  2006 Completed             [facility]



                                 00:00:00                         [facility]

 

           TDAP                  2006 Completed             [facility]



                                 00:00:00                         [facility]

 

           TDAP                  2006 Completed             [facility]



                                 00:00:00                         [facility]



                                                                  [facility]

 

           TDAP                  2006 Completed             [facility]



                                 00:00:00                         [facility]



                                                                  [facility]

 

           TDAP                  2006 Completed             [facility]



                                 00[facility]00:00                         [facility]

 

           TDAP                  2006 Completed             [facility]



                                 00[facility]00:00                         [facility]

 

           TDAP                  2006 Completed             [facility]



                                 00[facility]00:00                         [facility]

 

           TDAP                  2006 Completed             [facility]



                                 00:00:00                         [facility]



                                                                  [facility]

 

           TDAP                  2006 Completed             [facility]



                                 00:00:00                         [facility]

 

           TDAP                  2006 Completed             [facility]



                                 00:00:00                         [facility]



                                                                  [facility]

 

           TDAP                  2006 Completed             [facility]



                                 00:00:00                         [facility]



                                                                  [facility]

 

           TDAP                  2006 Completed             [facility]



                                 00:00:00                         [facility]



                                                                  Branch

 

           TDAP                  2006 Completed             [facility]



                                 00:00[facility]00                         [facility]



                                                                  Branch

 

           TDAP                  2006 Completed             [facility]



                                 00:00:00                         [facility]



                                                                  Branch

 

           TDAP                  2006 Completed             [facility]



                                 00:00:00                         [facility]



                                                                  Branch

 

           TDAP                  2006 Completed             [facility]



                                 00:00:00                         [facility]



                                                                  Branch

 

           TDAP                  2006 Completed             [facility]



                                 00:00:00                         [facility]



                                                                  Branch

 

           TDAP                  2006 Completed             [facility]



                                 00:00:00                         [facility]



                                                                  Branch

 

           TDAP                  2006 Completed             [facility]



                                 00:00:00                         [facility]



                                                                  Branch

 

           TDAP                  2006 Completed             [facility]



                                 00:00:00                         [facility]



                                                                  Branch

 

           TDAP                  2006 Completed             [facility]



                                 00:00:00                         [facility]



                                                                  Branch

 

           TDAP                  2006 Completed             [facility]



                                 00:00:00                         [facility]



                                                                  Branch

 

           TDAP                  2006 Completed             [facility]



                                 00:00:00                         [facility]



                                                                  Branch

 

           TDAP                  2006 Completed             [facility]



                                 00:00:00                         [facility]



                                                                  Branch

 

           TDAP                  2006 Completed             [facility]



                                 00:00:00                         [facility]



                                                                  Branch

 

           TDAP                  2006 Completed             [facility]



                                 00:00:00                         [facility]



                                                                  Branch

 

           TDAP                  2006 Completed             [facility]



                                 00:00:00                         [facility]



                                                                  Branch

 

           TDAP                  2006 Completed             [facility]



                                 00:00:00                         [facility]



                                                                  Branch

 

           TDAP                  2006 Completed             [facility]



                                 00:00:00                         [facility]



                                                                  Branch

 

           TDAP                  2006 Completed             [facility]



                                 00:00:00                         [facility]



                                                                  Branch

 

           TDAP                  2006 Completed             [facility]



                                 00:00:00                         [facility]



                                                                  Branch

 

           TDAP                  2006 Completed             [facility]



                                 00:00:00                         [facility]



                                                                  Branch

 

           TDAP                  2006 Completed             [facility]



                                 00:00:00                         [facility]



                                                                  Branch

 

           TDAP                  2006 Completed             [facility]



                                 00:00:00                         [facility]



                                                                  Branch

 

           TDAP                  2006 Completed             [facility]



                                 00:00:00                         [facility]



                                                                  Branch

 

           TDAP                  2006 Completed             [facility]



                                 00:00:00                         [facility]



                                                                  Branch

 

           TDAP                  2006 Completed             [facility]



                                 00:00:00                         [facility]



                                                                  Branch

 

           TDAP                  2006 Completed             [facility]



                                 00:00:00                         [facility]

 

           TDAP                  2006 Completed             [facility]



                                 00:00:00                         [facility]

 

           TDAP                  2006 Completed             [facility]



                                 00:00:00                         [facility]

 

           TDAP                  2006 Completed             [facility]



                                 00:00:00                         [facility]

 

           TDAP                  2006 Completed             [facility]



                                 00:00:00                         [facility]

 

           TDAP                  2006 Completed             [facility]



                                 00:00:00                         [facility]

 

           TDAP                  2006 Completed             [facility]



                                 00:00:00                         [facility]

 

           TDAP                  2006 Completed             [facility]



                                 00:00:00                         [facility]

 

           TDAP                  2006 Completed             [facility]



                                 00:00:00                         [facility]

 

           TDAP                  2006 Completed             [facility]



                                 00:00:00                         [facility]

 

           TDAP                  2006 Completed             [facility]



                                 00:00:00                         [facility]

 

           Influenza Virus            Unknown    Completed             [facility]

[facility] of



           Vaccine Quad IM                                             [facility]

ical



           Multi-dose 6+ MO                                             Branch

 

           Varicella             Unknown    Completed             [facility]



           (varivax)(chicken                                             [facility]

[facility]



           pox)                                                   Branch

 

           TDAP                  Unknown    Completed             [facility]

 

           HPV                   Unknown    Completed             [facility]

 

           HPV                   Unknown    Completed             [facility]

 

           HPV                   Unknown    Completed             [facility]

 

           Varicella             Unknown    Completed             [facility]



           (varivax)(chicken                                             [facility]

[facility]



           pox)                                                   Branch

 

           Influenza Virus            Unknown    Completed             [facility]

y of



           Vaccine Quad IM                                             [facility]

ical



           Multi-dose 6+ MO                                             Branch

 

           Influenza Virus            Unknown    Completed             [facility]

y of



           Vaccine [facility] IM                                             [facility]

ical



           Multi-dose 6+ MO                                             Branch

 

           TDAP                  Unknown    Completed             [facility]

 

           Influenza Virus            Unknown    Completed             Univers[facility]

y of



           Vaccine Quad .5 mL                                             [facility] 6+ MO                                               Branch



           (FLUZONE/FLULAVAL/F                                             



           LUARIX)                                                

 

           Influenza Virus            Unknown    Completed             Univers[facility]

y of



           [facility] ,                                             [facility]

[facility]



           Preserv and ABX                                             Branch



           Free 6 MO-64 YRS                                             



           (FLUCELVAX)                                             

 

           SARS-COV-2 COVID-19            Unknown    Completed             Unive

rsity of



           PFIZER VACCINE                                             [facility]

 

           SARS-COV-2 COVID-19            Unknown    Completed             Unive

rsity of



           PFIZER VACCINE                                             [facility]

 

           Influenza Virus            Unknown    Completed             Univers[facility]

y of



           Vaccine Quad IM                                             [facility]

ical



           Multi-dose 6+ MO                                             Branch

 

           Varicella             Unknown    Completed             [facility]



           (varivax)(chicken                                             [facility]

[facility]



           pox)                                                   Branch

 

           TDAP                  Unknown    Completed             [facility]

 

           HPV                   Unknown    Completed             [facility]

 

           HPV                   Unknown    Completed             [facility]

 

           HPV                   Unknown    Completed             [facility]

 

           Varicella             Unknown    Completed             [facility]



           (varivax)(chicken                                             [facility]

[facility]



           pox)                                                   Branch

 

           Influenza Virus            Unknown    Completed             [facility]

y of



           Vaccine Quad IM                                             [facility]

ical



           Multi-dose 6+ MO                                             Branch

 

           Influenza Virus            Unknown    Completed             [facility]

y of



           Vaccine Quad IM                                             [facility]

ical



           Multi-dose 6+ MO                                             Branch

 

           TDAP                  Unknown    Completed             [facility]

 

           Influenza Virus            Unknown    Completed             [facility]

y of



           [facility] .5 mL                                             [facility]



            6+ MO                                               Branch



           (FLUZONE/FLULAVAL/F                                             



           LUARIX)                                                

 

           Influenza Virus            Unknown    Completed             Univers[facility]

y of



           [facility] ,                                             [facility]



           Preserv and ABX                                             Branch



           Free 6 MO-64 YRS                                             



           (FLUCELVAX)                                             

 

           SARS-COV-2 COVID-19            Unknown    Completed             Unive

rsity of



           PFIZER VACCINE                                             [facility]

 

           SARS-COV-2 COVID-19            Unknown    Completed             Unive

rsity of



           PFIZER VACCINE                                             [facility]

 

           Influenza Virus            Unknown    Completed             Univers[facility]

y of



           Vaccine Quad IM                                             [facility]

ical



           Multi-dose 6+ MO                                             Branch

 

           Varicella             Unknown    Completed             [facility]



           (varivax)(chicken                                             [facility]

[facility]



           pox)                                                   Branch

 

           TDAP                  Unknown    Completed             [facility]

 

           HPV                   Unknown    Completed             [facility]

 

           HPV                   Unknown    Completed             [facility]

 

           HPV                   Unknown    Completed             [facility]

 

           Varicella             Unknown    Completed             [facility]



           (varivax)(chicken                                             [facility]

[facility]



           pox)                                                   Branch

 

           Influenza Virus            Unknown    Completed             [facility]

y of



           Vaccine Quad IM                                             [facility]

ical



           Multi-dose 6+ MO                                             Branch

 

           Influenza Virus            Unknown    Completed             [facility]

y of



           V[facility] Quad IM                                             [facility]

ical



           Multi-dose 6+ MO                                             Branch

 

           TDAP                  Unknown    Completed             [facility]

 

           Influenza Virus            Unknown    Completed             Univers[facility]

y of



           Vaccine Quad .5 mL                                             [facility] 6+ MO                                               Branch



           (FLUZONE/FLULAVAL/F                                             



           LUARIX)                                                

 

           Influenza Virus            Unknown    Completed             Univers[facility]

y of



           [facility] ,                                             [facility]



           Preserv and ABX                                             Branch



           Free 6 MO-64 YRS                                             



           (FLUCELVAX)                                             

 

           SARS-COV-2 COVID-19            Unknown    Completed             Unive

rsity of



           PFIZER VACCINE                                             [facility]

 

           SARS-COV-2 COVID-19            Unknown    Completed             Unive

rsity of



           PFIZER VACCINE                                             [facility]

[facility]



                                                                  [facility]anch

 

           Influenza Virus            Unknown    Completed             [facility]

y of



           Vaccine Quad IM                                             [facility]

ical



           Multi-dose 6+ MO                                             Branch

 

           Varicella             Unknown    Completed             [facility]



           (varivax)(chicken                                             [facility]

[facility]



           pox)                                                   Branch

 

           TDAP                  Unknown    Completed             [facility]

 

           HPV                   Unknown    Completed             [facility]

 

           HPV                   Unknown    Completed             [facility]

 

           HPV                   Unknown    Completed             [facility]

 

           Varicella             Unknown    Completed             [facility]



           (varivax)(chicken                                             [facility]

[facility]



           pox)                                                   Branch

 

           Influenza Virus            Unknown    Completed             [facility]

y of



           Vaccine Quad IM                                             [facility]

ical



           Multi-dose 6+ MO                                             Branch

 

           Influenza Virus            Unknown    Completed             [facility]

y of



           V[facility] Quad IM                                             [facility]

ical



           Multi-dose 6+ MO                                             Branch

 

           TDAP                  Unknown    Completed             [facility]

 

           Influenza Virus            Unknown    Completed             Univers[facility]

y of



           [facility] .5 mL                                             [facility]



           IM 6+ MO                                               Branch



           (FLUZONE/FLULAVAL/F                                             



           LUARIX)                                                

 

           Influenza Virus            Unknown    Completed             Univers[facility]

y of



           [facility] IM,                                             [facility]

[facility]



           Preserv and ABX                                             Branch



           Free 6 MO-64 YRS                                             



           (FLUCELVAX)                                             

 

           SARS-COV-2 COVID-19            Unknown    Completed             Unive

rsity of



           PFIZER VACCINE                                             [facility]

 

           SARS-COV-2 COVID-19            Unknown    Completed             Unive

rsity of



           PFIZER VACCINE                                             [facility]

 

           Influenza Virus            Unknown    Completed             Univers[facility]

y of



           Vaccine Quad IM                                             [facility]

ical



           Multi-dose 6+ MO                                             Branch

 

           Varicella             Unknown    Completed             [facility]



           (varivax)(chicken                                             [facility]

[facility]



           pox)                                                   Branch

 

           TDAP                  Unknown    Completed             [facility]

 

           HPV                   Unknown    Completed             [facility]

 

           HPV                   Unknown    Completed             [facility]

 

           HPV                   Unknown    Completed             [facility]

 

           Varicella             Unknown    Completed             [facility]



           (varivax)(chicken                                             [facility]

[facility]



           pox)                                                   Branch

 

           Influenza Virus            Unknown    Completed             [facility]

y of



           Vaccine Quad IM                                             [facility]

ical



           Multi-dose 6+ MO                                             Branch

 

           Influenza Virus            Unknown    Completed             [facility]

y of



           Vaccine Quad IM                                             [facility]

ical



           Multi-dose 6+ MO                                             Branch

 

           TDAP                  Unknown    Completed             [facility]

 

           Influenza Virus            Unknown    Completed             Univers[facility]

y of



           Vaccine Quad .5 mL                                             [facility] 6+ MO                                               Branch



           (FLUZONE/FLULAVAL/F                                             



           LUARIX)                                                

 

           Influenza Virus            Unknown    Completed             [facility]

y of



           [facility] Quad IM,                                             [facility]



           Preserv and ABX                                             Branch



           Free 6 MO-64 YRS                                             



           (FLUCELVAX)                                             

 

           SARS-COV-2 COVID-19            Unknown    Completed             Unive

rsity of



           PFIZER VACCINE                                             [facility]

 

           SARS-COV-2 COVID-19            Unknown    Completed             Unive

rsity of



           PFIZER VACCINE                                             [facility]

 

           Influenza Virus            Unknown    Completed             [facility]

y of



           Vaccine Quad IM                                             [facility]

ical



           Multi-dose 6+ MO                                             Branch

 

           Varicella             Unknown    Completed             [facility]



           (varivax)(chicken                                             [facility]

[facility]



           pox)                                                   Branch

 

           TDAP                  Unknown    Completed             [facility]

 

           HPV                   Unknown    Completed             [facility]

 

           HPV                   Unknown    Completed             [facility]

 

           HPV                   Unknown    Completed             [facility]

 

           Varicella             Unknown    Completed             [facility]



           (varivax)(chicken                                             [facility]

[facility]



           pox)                                                   Branch

 

           Influenza Virus            Unknown    Completed             [facility]

y of



           Vaccine Quad IM                                             [facility]

ical



           Multi-dose 6+ MO                                             Branch

 

           Influenza Virus            Unknown    Completed             Universit

y of



           Vaccine Quad IM                                             [facility]

ical



           Multi-dose 6+ MO                                             Branch

 

           TDAP                  Unknown    Completed             [facility]

 

           Influenza Virus            Unknown    Completed             Universit

y of



           Vaccine Quad .5 mL                                             [facility] 6+ MO                                               Branch



           (FLUZONE/FLULAVAL/F                                             



           LUARIX)                                                

 

           Influenza Virus            Unknown    Completed             Universit

y of



           Vaccine Quad ,                                             [facility]

[facility]



           Preserv and ABX                                             Branch



           Free 6 MO-64 YRS                                             



           (FLUCELVAX)                                             

 

           SARS-COV-2 COVID-19            Unknown    Completed             Unive

rsity of



           PFIZER VACCINE                                             [facility]

 

           SARS-COV-2 COVID-19            Unknown    Completed             Unive

rsity of



           PFIZER VACCINE                                             [facility]

 

           Influenza Virus            Unknown    Completed             Universit

y of



           Vaccine Quad IM                                             [facility]

ical



           Multi-dose 6+ MO                                             Branch

 

           Varicella             Unknown    Completed             [facility]



           (varivax)(chicken                                             [facility]

[facility]



           pox)                                                   Branch

 

           TDAP                  Unknown    Completed             [facility]

 

           HPV                   Unknown    Completed             [facility]

 

           HPV                   Unknown    Completed             [facility]

 

           HPV                   Unknown    Completed             [facility]

 

           Varicella             Unknown    Completed             [facility] 



           (varivax)(chicken                                             [facility]

[facility]



           pox)                                                   Branch

 

           Influenza Virus            Unknown    Completed             Universit

y of



           Vaccine Quad IM                                             [facility]

ical



           Multi-dose 6+ MO                                             Branch

 

           Influenza Virus            Unknown    Completed             Universit

y of



           Vaccine Quad IM                                             [facility]

ical



           Multi-dose 6+ MO                                             Branch

 

           TDAP                  Unknown    Completed             [facility]

 

           Influenza Virus            Unknown    Completed             Universit

y of



           Vaccine Quad .5 mL                                             [facility] 6+ MO                                               Branch



           (FLUZONE/FLULAVAL/F                                             



           LUARIX)                                                

 

           Influenza Virus            Unknown    Completed             Universit

y of



           Vaccine Quad IM,                                             [facility]

[facility]



           Preserv and ABX                                             Branch



           Free 6 MO-64 YRS                                             



           (FLUCELVAX)                                             

 

           SARS-COV-2 COVID-19            Unknown    Completed             Unive

rsity of



           PFIZER VACCINE                                             [facility]

 

           SARS-COV-2 COVID-19            Unknown    Completed             Unive

rsity of



           PFIZER VACCINE                                             [facility]

 

           Influenza Virus            Unknown    Completed             Universit

y of



           Vaccine Quad IM                                             [facility]

ical



           Multi-dose 6+ MO                                             Branch

 

           Varicella             Unknown    Completed             [facility]



           (varivax)(chicken                                             [facility]

[facility]



           pox)                                                   Branch

 

           TDAP                  Unknown    Completed             [facility]

 

           HPV                   Unknown    Completed             [facility]

 

           HPV                   Unknown    Completed             [facility]

 

           HPV                   Unknown    Completed             [facility]

 

           Varicella             Unknown    Completed             [facility]



           (varivax)(chicken                                             [facility]

[facility]



           pox)                                                   Branch

 

           Influenza Virus            Unknown    Completed             Universit

y of



           Vaccine Quad IM                                             [facility]

ical



           Multi-dose 6+ MO                                             Branch

 

           Influenza Virus            Unknown    Completed             Universit

y of



           Vaccine Quad IM                                             [facility]

ical



           Multi-dose 6+ MO                                             Branch

 

           TDAP                  Unknown    Completed             [facility]

 

           Influenza Virus            Unknown    Completed             Universit

y of



           Vaccine Quad .5 mL                                             [facility] 6+ MO                                               Branch



           (FLUZONE/FLULAVAL/F                                             



           LUARIX)                                                

 

           Influenza Virus            Unknown    Completed             Universit

y of



           Vaccine Quad ,                                             [facility]

[facility]



           Preserv and ABX                                             Branch



           Free 6 MO-64 YRS                                             



           (FLUCELVAX)                                             

 

           SARS-COV-2 COVID-19            Unknown    Completed             Unive

rsity of



           PFIZER VACCINE                                             [facility]

 

           SARS-COV-2 COVID-19            Unknown    Completed             Unive

rsity of



           PFIZER VACCINE                                             [facility]

 

           Influenza Virus            Unknown    Completed             Universit

y of



           Vaccine Quad IM                                             [facility]

ical



           Multi-dose 6+ MO                                             Branch

 

           Varicella             Unknown    Completed             [facility]



           (varivax)(chicken                                             [facility]

[facility]



           pox)                                                   Branch

 

           TDAP                  Unknown    Completed             [facility]

 

           HPV                   Unknown    Completed             [facility]

 

           HPV                   Unknown    Completed             [facility]

 

           HPV                   Unknown    Completed             [facility]

 

           Varicella             Unknown    Completed             [facility] 



           (varivax)(chicken                                             [facility]

[facility]



           pox)                                                   Branch

 

           Influenza Virus            Unknown    Completed             Univers[facility]

y of



           Vaccine Quad IM                                             [facility]

ical



           Multi-dose 6+ MO                                             Branch

 

           Influenza Virus            Unknown    Completed             Universit

y of



           Vaccine Quad IM                                             [facility]

ical



           Multi-dose 6+ MO                                             Branch

 

           TDAP                  Unknown    Completed             [facility]

 

           Influenza Virus            Unknown    Completed             Universit

y of



           Vaccine Quad .5 mL                                             [facility] 6+ MO                                               Branch



           (FLUZONE/FLULAVAL/F                                             



           LUARIX)                                                

 

           Influenza Virus            Unknown    Completed             Universit

y of



           Vaccine Quad ,                                             [facility]

[facility]



           Preserv and ABX                                             Branch



           Free 6 MO-64 YRS                                             



           (FLUCELVAX)                                             

 

           SARS-COV-2 COVID-19            Unknown    Completed             Unive

rsity of



           PFIZER VACCINE                                             [facility]

 

           SARS-COV-2 COVID-19            Unknown    Completed             Unive

rsity of



           PFIZER VACCINE                                             [facility]

 

           Influenza Virus            Unknown    Completed             Universit

y of



           Vaccine Quad IM                                             [facility]

ical



           Multi-dose 6+ MO                                             Branch

 

           Varicella             Unknown    Completed             [facility]



           (varivax)(chicken                                             [facility]

[facility]



           pox)                                                   Branch

 

           TDAP                  Unknown    Completed             [facility]

 

           HPV                   Unknown    Completed             [facility]

 

           HPV                   Unknown    Completed             [facility]

 

           HPV                   Unknown    Completed             [facility]

 

           Varicella             Unknown    Completed             [facility]



           (varivax)(chicken                                             [facility]

[facility]



           pox)                                                   Branch

 

           Influenza Virus            Unknown    Completed             [facility]

y of



           Vaccine Quad IM                                             [facility]

ical



           Multi-dose 6+ MO                                             Branch

 

           Influenza Virus            Unknown    Completed             Universit

y of



           Vaccine Quad IM                                             [facility]

ical



           Multi-dose 6+ MO                                             Branch

 

           TDAP                  Unknown    Completed             [facility]

 

           Influenza Virus            Unknown    Completed             Universit

y of



           Vaccine Quad .5 mL                                             [facility] 6+ MO                                               Branch



           (FLUZONE/FLULAVAL/F                                             



           LUARIX)                                                

 

           Influenza Virus            Unknown    Completed             Universit

y of



           Vaccine Quad ,                                             [facility]

[facility]



           Preserv and ABX                                             Branch



           Free 6 MO-64 YRS                                             



           (FLUCELVAX)                                             

 

           SARS-COV-2 COVID-19            Unknown    Completed             Unive

rsity of



           PFIZER VACCINE                                             [facility]

 

           SARS-COV-2 COVID-19            Unknown    Completed             Unive

rsity of



           PFIZER VACCINE                                             [facility]

 

           Influenza Virus            Unknown    Completed             Universit

y of



           Vaccine Quad IM                                             [facility]

ical



           Multi-dose 6+ MO                                             Branch

 

           Varicella             Unknown    Completed             [facility]



           (varivax)(chicken                                             [facility]

[facility]



           pox)                                                   Branch

 

           TDAP                  Unknown    Completed             [facility]

 

           HPV                   Unknown    Completed             [facility]

 

           HPV                   Unknown    Completed             [facility]

 

           HPV                   Unknown    Completed             [facility]

 

           Varicella             Unknown    Completed             [facility]



           (varivax)(chicken                                             [facility]

[facility]



           pox)                                                   Branch

 

           Influenza Virus            Unknown    Completed             [facility]

y of



           Vaccine Quad IM                                             [facility]

ical



           Multi-dose 6+ MO                                             Branch

 

           Influenza Virus            Unknown    Completed             Universit

y of



           Vaccine Quad IM                                             [facility]

ical



           Multi-dose 6+ MO                                             Branch

 

           TDAP                  Unknown    Completed             [facility]

 

           Influenza Virus            Unknown    Completed             Universit

y of



           Vaccine Quad .5 mL                                             [facility] 6+ MO                                               Branch



           (FLUZONE/FLULAVAL/F                                             



           LUARIX)                                                

 

           Influenza Virus            Unknown    Completed             Universit

y of



           Vaccine Quad IM,                                             [facility]

[facility]



           Preserv and ABX                                             Branch



           Free 6 MO-64 YRS                                             



           (FLUCELVAX)                                             

 

           SARS-COV-2 COVID-19            Unknown    Completed             Unive

rsity of



           PFIZER VACCINE                                             [facility]

 

           SARS-COV-2 COVID-19            Unknown    Completed             Unive

rsity of



           PFIZER VACCINE                                             [facility]

 

           Influenza Virus            Unknown    Completed             Universit

y of



           Vaccine Quad IM                                             [facility]

ical



           Multi-dose 6+ MO                                             Branch

 

           Varicella             Unknown    Completed             [facility]



           (varivax)(chicken                                             [facility]

[facility]



           pox)                                                   Branch

 

           TDAP                  Unknown    Completed             [facility]

 

           HPV                   Unknown    Completed             [facility]

 

           HPV                   Unknown    Completed             [facility]

 

           HPV                   Unknown    Completed             [facility]

 

           Varicella             Unknown    Completed             [facility]



           (varivax)(chicken                                             [facility]

[facility]



           pox)                                                   Branch

 

           Influenza Virus            Unknown    Completed             Univers[facility]

y of



           Vaccine Quad IM                                             [facility]

ical



           Multi-dose 6+ MO                                             Branch

 

           Influenza Virus            Unknown    Completed             Universit

y of



           Vaccine Quad IM                                             [facility]

ical



           Multi-dose 6+ MO                                             Branch

 

           TDAP                  Unknown    Completed             [facility]

 

           Influenza Virus            Unknown    Completed             Universit

y of



           Vaccine Quad .5 mL                                             [facility] 6+ MO                                               Branch



           (FLUZONE/FLULAVAL/F                                             



           LUARIX)                                                

 

           SARS-COV-2 COVID-19            Unknown    Completed             Unive

rsity of



           PFIZER VACCINE                                             [facility]

 

           SARS-COV-2 COVID-19            Unknown    Completed             Unive

rsity of



           PFIZER VACCINE                                             [facility]

 

           Influenza Virus            Unknown    Completed             Universit

y of



           Vaccine Quad IM                                             [facility]

ical



           Multi-dose 6+ MO                                             Branch

 

           Varicella             Unknown    Completed             [facility]



           (varivax)(chicken                                             [facility]

[facility]



           pox)                                                   Branch

 

           TDAP                  Unknown    Completed             [facility]

 

           HPV                   Unknown    Completed             [facility]

 

           HPV                   Unknown    Completed             [facility]

 

           HPV                   Unknown    Completed             [facility]

 

           Varicella             Unknown    Completed             [facility]



           (varivax)(chicken                                             [facility]

[facility]



           pox)                                                   Branch

 

           Influenza Virus            Unknown    Completed             [facility]

y of



           Vaccine Quad IM                                             [facility]

ical



           Multi-dose 6+ MO                                             Branch

 

           Influenza Virus            Unknown    Completed             Universit

y of



           Vaccine Quad IM                                             [facility]

ical



           Multi-dose 6+ MO                                             Branch

 

           TDAP                  Unknown    Completed             [facility]

 

           Influenza Virus            Unknown    Completed             Univers[facility]

y of



           Vaccine Quad .5 mL                                             [facility] 6+ MO                                               Branch



           (FLUZONE/FLULAVAL/F                                             



           LUARIX)                                                

 

           SARS-COV-2 COVID-19            Unknown    Completed             Unive

rsity of



           PFIZER VACCINE                                             [facility]

 

           SARS-COV-2 COVID-19            Unknown    Completed             Unive

rsity of



           PFIZER VACCINE                                             [facility]

 

           Influenza Virus            Unknown    Completed             Universit

y of



           Vaccine Quad IM                                             [facility]

ical



           Multi-dose 6+ MO                                             Branch

 

           Varicella             Unknown    Completed             [facility]



           (varivax)(chicken                                             [facility]

[facility]



           pox)                                                   Branch

 

           TDAP                  Unknown    Completed             [facility]

 

           HPV                   Unknown    Completed             [facility]

 

           HPV                   Unknown    Completed             [facility]

 

           HPV                   Unknown    Completed             [facility]

 

           Varicella             Unknown    Completed             [facility]



           (varivax)(chicken                                             [facility]

[facility]



           pox)                                                   Branch

 

           Influenza Virus            Unknown    Completed             [facility]

y of



           Vaccine Quad IM                                             [facility]

ical



           Multi-dose 6+ MO                                             Branch

 

           Influenza Virus            Unknown    Completed             [facility]

y of



           Vaccine Quad IM                                             [facility]

ical



           Multi-dose 6+ MO                                             Branch

 

           TDAP                  Unknown    Completed             [facility]

 

           Influenza Virus            Unknown    Completed             Universit

y of



           Vaccine Quad .5 mL                                             [facility] 6+ MO                                               Branch



           (FLUZONE/FLULAVAL/F                                             



           LUARIX)                                                

 

           SARS-COV-2 COVID-19            Unknown    Completed             Unive

rsity of



           PFIZER VACCINE                                             [facility]

 

           SARS-COV-2 COVID-19            Unknown    Completed             Unive

rsity of



           PFIZER VACCINE                                             [facility]

 

           Influenza Virus            Unknown    Completed             Universit

y of



           Vaccine Quad IM                                             [facility]

ical



           Multi-dose 6+ MO                                             [facility]

 

           Varicella             Unknown    Completed             [facility]



           (varivax)(chicken                                             [facility]

[facility]



           pox)                                                   Branch

 

           TDAP                  Unknown    Completed             [facility]

 

           HPV                   Unknown    Completed             [facility]

 

           HPV                   Unknown    Completed             [facility]

 

           HPV                   Unknown    Completed             [facility]

 

           Varicella             Unknown    Completed             [facility]



           (varivax)(chicken                                             [facility]

[facility]



           pox)                                                   Branch

 

           Influenza Virus            Unknown    Completed             Univers[facility]

y of



           Vaccine Quad IM                                             [facility]

ical



           Multi-dose 6+ MO                                             Branch

 

           Influenza Virus            Unknown    Completed             Universit

y of



           Vaccine Quad IM                                             [facility]

ical



           Multi-dose 6+ MO                                             Branch

 

           TDAP                  Unknown    Completed             [facility]

 

           Influenza Virus            Unknown    Completed             Universit

y of



           Vaccine Quad .5 mL                                             [facility] 6+ MO                                               Branch



           (FLUZONE/FLULAVAL/F                                             



           LUARIX)                                                

 

           Influenza Virus            Unknown    Completed             Universit

y of



           Vaccine Quad ,                                             [facility]

[facility]



           Preserv and ABX                                             Branch



           Free 6 MO-64 YRS                                             



           (FLUCELVAX)                                             

 

           SARS-COV-2 COVID-19            Unknown    Completed             Unive

rsity of



           PFIZER VACCINE                                             [facility]

 

           SARS-COV-2 COVID-19            Unknown    Completed             Unive

rsity of



           PFIZER VACCINE                                             [facility]

 

           Influenza Virus            Unknown    Completed             Universit

y of



           Vaccine Quad IM                                             [facility]

ical



           Multi-dose 6+ MO                                             Branch

 

           Varicella             Unknown    Completed             [facility]



           (varivax)(chicken                                             [facility]

[facility]



           pox)                                                   Branch

 

           TDAP                  Unknown    Completed             [facility]

 

           HPV                   Unknown    Completed             [facility]

 

           HPV                   Unknown    Completed             [facility]

 

           HPV                   Unknown    Completed             [facility]

 

           Varicella             Unknown    Completed             [facility] 



           (varivax)(chicken                                             [facility]

[facility]



           pox)                                                   Branch

 

           Influenza Virus            Unknown    Completed             Univers[facility]

y of



           Vaccine Quad IM                                             [facility]

ical



           Multi-dose 6+ MO                                             Branch

 

           Influenza Virus            Unknown    Completed             [facility]

y of



           Vaccine Quad IM                                             [facility]

ical



           Multi-dose 6+ MO                                             Branch

 

           TDAP                  Unknown    Completed             [facility]

 

           Influenza Virus            Unknown    Completed             Univers[facility]

y of



           Vaccine Quad .5 mL                                             [facility]



           IM 6+ MO                                               Branch



           (FLUZONE/FLULAVAL/F                                             



           LUARIX)                                                

 

           Influenza Virus            Unknown    Completed             Universit

y of



           Vaccine Quad IM,                                             [facility]

[facility]



           Preserv and ABX                                             Branch



           Free 6 MO-64 YRS                                             



           (FLUCELVAX)                                             

 

           SARS-COV-2 COVID-19            Unknown    Completed             Unive

rsity of



           PFIZER VACCINE                                             [facility]

 

           SARS-COV-2 COVID-19            Unknown    Completed             Unive

rsity of



           PFIZER VACCINE                                             [facility]

[facility]



                                                                  Branch







Vital Signs







             Vital Name   Observation Time Observation Value Comments     Source

 

             Systolic blood 2023 15:46:00 104 mm[Hg]                Un[facility]

sity of



             [facility]

 

             Diastolic blood 2023 15:46:00 62 mm[Hg]                 Unive

rsity of



             [facility]

 

             Heart rate   2023 15:46:00 76 /min                   [facility]

 

             Body temperature 2023 15:46:00 35.5 [facility]                  [facility]

 

             Respiratory rate 2023 15:46:00 16 /min                   [facility]

 

             Body height  2023 15:46:00 172.7 cm                  [facility]

 

             Body weight  2023 15:46:00 82.691 kg                 [facility]

 

             BMI          2023 15:46:00 27.72 kg/m2               [facility]

 

             Systolic blood 2023-08-15 17:01:00 107 mm[Hg]                Univer

sity of



             [facility]

 

             Diastolic blood 2023-08-15 17:01:00 64 mm[Hg]                 Unive

rsity of



             [facility]

 

             Heart rate   2023-08-15 17:01:00 66 /min                   [facility]

 

             Body temperature 2023-08-15 17:01:00 36.5 [place]                  Univ

ersity of



                                                                 [facility]



                                                                 Branch

 

             Respiratory rate 2023-08-15 17:01:00 18 /min                   Univ

ersity of



                                                                 [facility]



                                                                 Branch

 

             Body height  2023-08-15 17:01:00 172.7 cm                  Universi

ty of



                                                                 [facility]



                                                                 Branch

 

             Body weight  2023-08-15 17:01:00 83.008 kg                 Universi

ty of



                                                                 [facility]



                                                                 Branch

 

             BMI          2023-08-15 17:01:00 27.83 kg/m2               Universi

ty of



                                                                 [facility]



                                                                 Branch

 

             Systolic blood 2023 05:30:00 115 mm[Hg]                Univer

sity of



             pressure                                            [facility]



                                                                 Branch

 

             Diastolic blood 2023 05:30:00 56 mm[Hg]                 Unive

rsity of



             pressure                                            [facility]



                                                                 Branch

 

             Heart rate   2023 05:30:00 67 /min                   Universi

ty of



                                                                 [facility]



                                                                 Branch

 

             Body temperature 2023 05:30:00 36.78 [place]                 Univ

ersity of



                                                                 [facility]



                                                                 Branch

 

             Respiratory rate 2023 05:30:00 16 /min                   Univ

ersity of



                                                                 [facility]

 

             Oxygen saturation in 2023 05:30:00 98 /min                   

[facility]



             Arterial blood by                                        [facility]

neil



             Pulse oximetry                                        Branch

 

             Body height  2023 03:22:00 172.7 cm                  Universi

ty of



                                                                 [facility]



                                                                 Branch

 

             Body weight  2023 03:22:00 73.936 kg                 Universi

ty of



                                                                 [facility]



                                                                 Branch

 

             BMI          2023 03:22:00 24.78 kg/m2               Universi

ty of



                                                                 [facility]



                                                                 Branch

 

             Systolic blood 2023 19:13:00 118 mm[Hg]                Univer

sity of



             pressure                                            [facility]



                                                                 Branch

 

             Diastolic blood 2023 19:13:00 77 mm[Hg]                 Unive

rsity of



             pressure                                            [facility]



                                                                 Branch

 

             Heart rate   2023 19:13:00 64 /min                   Universi

ty of



                                                                 [facility]



                                                                 Branch

 

             Body temperature 2023 19:13:00 36.67 [place]                 Univ

ersity of



                                                                 [facility]



                                                                 Branch

 

             Respiratory rate 2023 19:13:00 18 /min                   Univ

ersity of



                                                                 [facility]



                                                                 Branch

 

             Body height  2023 19:13:00 172.7 cm                  Universi

ty of



                                                                 [facility]



                                                                 Branch

 

             Body weight  2023 19:13:00 85.73 kg                  Universi

ty of



                                                                 [facility]



                                                                 Branch

 

             BMI          2023 19:13:00 28.74 kg/m2               Universi

ty of



                                                                 [facility]



                                                                 Branch

 

             Systolic blood 2023 15:26:00 111 mm[Hg]                Univer

sity of



             pressure                                            [facility]



                                                                 Branch

 

             Diastolic blood 2023 15:26:00 75 mm[Hg]                 Unive

rsity of



             pressure                                            [facility]



                                                                 Branch

 

             Heart rate   2023 15:26:00 89 /min                   Univers[facility]

ty of



                                                                 [facility]



                                                                 Branch

 

             Body temperature 2023 15:26:00 36.67 [facility]                 Univ

ersity of



                                                                 [facility]



                                                                 Branch

 

             Respiratory rate 2023 15:26:00 18 /min                   Univ

ersity of



                                                                 [facility]



                                                                 Branch

 

             Body height  2023 15:26:00 172.7 cm                  [facility]

ty of



                                                                 [facility]



                                                                 Branch

 

             Body weight  2023 15:26:00 84.188 kg                 Universi

ty of



                                                                 [facility]



                                                                 Branch

 

             BMI          2023 15:26:00 28.22 kg/m2               Universi

ty of



                                                                 [facility]



                                                                 Branch

 

             Systolic blood 2023 18:27:00 131 mm[Hg]                Univer

sity of



             pressure                                            [facility]



                                                                 Branch

 

             Diastolic blood 2023 18:27:00 86 mm[Hg]                 Unive

rsity of



             pressure                                            [facility]



                                                                 Branch

 

             Heart rate   2023 18:27:00 78 /min                   [facility]

ty of



                                                                 [facility]



                                                                 Branch

 

             Body temperature 2023 18:27:00 36.89 [facility]                 Univ

ersity of



                                                                 [facility]



                                                                 Branch

 

             Respiratory rate 2023 18:27:00 16 /min                   Univ

ersity of



                                                                 [facility]



                                                                 Branch

 

             Body height  2023 18:27:00 172.7 cm                  [facility]

ty of



                                                                 [facility]



                                                                 Branch

 

             Body weight  2023 18:27:00 88.707 kg                 Universi

ty of



                                                                 [facility]



                                                                 Branch

 

             BMI          2023 18:27:00 29.74 kg/m2               Universi

ty of



                                                                 [facility]



                                                                 Branch

 

             Oxygen saturation in 2023 18:27:00 100 /min                  

University 



             Arterial blood by                                        [facility]

[facility]



             Pulse oximetry                                        Branch

 

             Systolic blood 2023 13:41:00 108 mm[Hg]                Univer

sity of



             pressure                                            [facility]



                                                                 Branch

 

             Diastolic blood 2023 13:41:00 64 mm[Hg]                 Unive

rsity of



             pressure                                            [facility]



                                                                 Branch

 

             Heart rate   2023 13:41:00 84 /min                   Universi

ty of



                                                                 [facility]



                                                                 Branch

 

             Body temperature 2023 13:41:00 36.72 [facility]                 Univ

ersity of



                                                                 [facility]



                                                                 Branch

 

             Respiratory rate 2023 13:41:00 16 /min                   Univ

ersity of



                                                                 [facility]



                                                                 Branch

 

             Oxygen saturation in 2023 13:41:00 98 /min                   

[facility]



             Arterial blood by                                        [facility]

[facility]



             Pulse oximetry                                        Branch

 

             Body height  2023 19:00:00 172.7 cm                  [facility]

ty of



                                                                 [facility]



                                                                 Branch

 

             Body weight  2023 19:00:00 93.895 kg                 Universi

ty of



                                                                 [facility]



                                                                 Branch

 

             BMI          2023 19:00:00 31.47 kg/m2               Universi

ty of



                                                                 [facility]



                                                                 Branch

 

             Systolic blood 2023-03-10 00:30:00 123 mm[Hg]                Univer

sity of



             pressure                                            [facility]



                                                                 Branch

 

             Diastolic blood 2023-03-10 00:30:00 75 mm[Hg]                 Unive

rsity of



             pressure                                            [facility]



                                                                 Branch

 

             Heart rate   2023 23:15:00 70 /min                   Universi

ty of



                                                                 [facility]



                                                                 Branch

 

             Oxygen saturation in 2023 23:15:00 100 /min                  

[facility]



             Arterial blood by                                        [facility]

[facility]



             Pulse oximetry                                        Branch

 

             Respiratory rate 2023 22:30:00 20 /min                   Univ

ersity of



                                                                 [facility]



                                                                 Branch

 

             Body temperature 2023 19:00:00 36.78 [facility]                 Univ

ersity of



                                                                 [facility]



                                                                 Branch

 

             Body height  2023 19:00:00 172.7 cm                  [facility]

ty of



                                                                 [facility]



                                                                 Branch

 

             Body weight  2023 19:00:00 93.895 kg                 Univers[facility]

ty of



                                                                 [facility]



                                                                 Branch

 

             BMI          2023 19:00:00 31.47 kg/m2               Universi

ty of



                                                                 [facility]



                                                                 Branch

 

             Systolic blood 2023-03-10 22:10:00 120 mm[Hg]                Univer

sity of



             pressure                                            [facility]



                                                                 Branch

 

             Diastolic blood 2023-03-10 22:10:00 70 mm[Hg]                 Unive

rsity of



             pressure                                            [facility]



                                                                 Branch

 

             Heart rate   2023-03-10 22:10:00 78 /min                   Universi

ty of



                                                                 [facility]



                                                                 Branch

 

             Body temperature 2023-03-10 22:10:00 36.72 [place]                 [facility]iv

ersity of



                                                                 [facility]



                                                                 Branch

 

             Respiratory rate 2023-03-10 22:10:00 18 /min                   Univ

ersity of



                                                                 [facility]



                                                                 Branch

 

             Oxygen saturation in 2023-03-10 22:10:00 96 /min                   

[facility]



             Arterial blood by                                        [facility]

[facility]



             Pulse oximetry                                        Branch

 

             Body height  2023 19:11:00 172.7 cm                  [facility]

ty of



                                                                 [facility]



                                                                 Branch

 

             Body weight  2023 19:11:00 94.008 kg                 Univers[facility]

ty of



                                                                 [facility]



                                                                 Branch

 

             BMI          2023 19:11:00 31.51 kg/m2               Universi

ty of



                                                                 [facility]



                                                                 Branch

 

             Systolic blood 2023 19:51:00 103 mm[Hg]                Univer

sity of



             pressure                                            [facility]



                                                                 Branch

 

             Diastolic blood 2023 19:51:00 65 mm[Hg]                 Unive

rsity of



             pressure                                            [facility]



                                                                 Branch

 

             Heart rate   2023 19:51:00 80 /min                   [facility]

[facility]



                                                                 [facility]



                                                                 [facility]

 

             Body temperature 2023 19:51:00 37.17 [place]                 [facility]

[facility]



                                                                 [facility]



                                                                 [facility]

 

             Respiratory rate 2023 19:51:00 16 /min                   [facility]

[facility] of



                                                                 [facility]



                                                                 [facility]

 

             Body height  2023 19:51:00 172.7 cm                  [facility]

[facility]



                                                                 [facility]



                                                                 [facility]

 

             Body weight  2023 19:51:00 93.186 kg                 [facility]

[facility]



                                                                 [facility]



                                                                 [facility]

 

             BMI          2023 19:51:00 31.24 kg/m2               [facility]

[facility] of



                                                                 [facility]



                                                                 [facility]

 

             Oxygen saturation in 2023 19:51:00 98 /min                   

[facility]



             Arterial blood by                                        [facility]

[facility]



             Pulse oximetry                                        Branch

 

             Systolic blood 2023-02-15 19:10:00 121 mm[Hg]                Univer

sity of



             pressure                                            [facility]



                                                                 [facility]

 

             Diastolic blood 2023-02-15 19:10:00 74 mm[Hg]                 Unive

rs[facility] of



             pressure                                            [facility]



                                                                 [facility]

 

             Heart rate   2023-02-15 19:10:00 97 /min                   [facility]

[facility] of



                                                                 [facility]



                                                                 [facility]

 

             Body temperature 2023-02-15 19:10:00 36.72 [place]                 [facility]

[facility]



                                                                 [facility]



                                                                 [facility]

 

             Respiratory rate 2023-02-15 19:10:00 20 /min                   [facility]

[facility] of



                                                                 [facility]



                                                                 [facility]

 

             Body height  2023-02-15 19:10:00 172.7 cm                  [facility]

[facility]



                                                                 [facility]



                                                                 [facility]

 

             Body weight  2023-02-15 19:10:00 93.895 kg                 [facility]

[facility]



                                                                 [facility]



                                                                 [facility]

 

             BMI          2023-02-15 19:10:00 31.47 kg/m2               [facility]

[facility] of



                                                                 [facility]



                                                                 [facility]

 

             Systolic blood 2023 04:30:00 114 mm[Hg]                [facility]

sity of



             pressure                                            [facility]



                                                                 [facility]

 

             Diastolic blood 2023 04:30:00 70 mm[Hg]                 [facility]

rsity of



             pressure                                            [facility]



                                                                 [facility]

 

             Heart rate   2023 04:30:00 93 /min                   [facility]

[facility]



                                                                 [facility]



                                                                 [facility]

 

             Respiratory rate 2023 04:30:00 18 /min                   [facility]

[facility] of



                                                                 [facility]



                                                                 [facility]

 

             Oxygen saturation in 2023 04:30:00 100 /min                  

[facility]



             Arterial blood by                                        [facility]

[facility]



             Pulse oximetry                                        [facility]

 

             Body weight  2023 00:19:00 8.165 kg                  [facility]

[facility]



                                                                 [facility]



                                                                 [facility]

 

             BMI          2023 00:19:00 2.74 kg/m2                [facility]

[facility] of



                                                                 [facility]



                                                                 [facility]

 

             Systolic blood 2023 17:26:00 110 mm[Hg]                [facility]

sity of



             pressure                                            [facility]



                                                                 [facility]

 

             Diastolic blood 2023 17:26:00 69 mm[Hg]                 [facility]

rsity of



             pressure                                            [facility]



                                                                 [facility]

 

             Heart rate   2023 17:26:00 98 /min                   [facility]

[facility] of



                                                                 [facility]



                                                                 [facility]

 

             Body temperature 2023 17:26:00 37.06 [place]                 [facility]

[facility]



                                                                 [facility]



                                                                 [facility]

 

             Respiratory rate 2023 17:26:00 16 /min                   [facility]

[facility] of



                                                                 [facility]



                                                                 [facility]

 

             Body height  2023 17:26:00 172.7 cm                  [facility]

[facility]



                                                                 [facility]



                                                                 [facility]

 

             Body weight  2023 17:26:00 94.121 kg                 [facility]

[facility]



                                                                 [facility]



                                                                 [facility]

 

             BMI          2023 17:26:00 31.55 kg/m2               [facility]

[facility] of



                                                                 [facility]



                                                                 [facility]

 

             Oxygen saturation in 2023 17:26:00 100 /min                  

[facility]



             Arterial blood by                                        [facility]

[facility]



             Pulse oximetry                                        [facility]

 

             Heart rate   2023 02:00:00 80 /min                   [facility]

[facility] of



                                                                 [facility]



                                                                 [facility]

 

             Respiratory rate 2023 02:00:00 18 /min                   [facility]

[facility] of



                                                                 [facility]



                                                                 [facility]

 

             Oxygen saturation in 2023 02:00:00 100 /min                  

[facility]



             Arterial blood by                                        [facility]

[facility]



             Pulse oximetry                                        [facility]

 

             Systolic blood 2023 01:10:00 116 mm[Hg]                [facility]

[facility]ity of



             pressure                                            [facility]



                                                                 [facility]

 

             Diastolic blood 2023 01:10:00 63 mm[Hg]                 [facility]

rsity of



             pressure                                            [facility]



                                                                 [facility]

 

             Body temperature 2023 01:10:00 36.5 [place]                  [facility]

ers[facility] of



                                                                 [facility]



                                                                 [facility]

 

             Body weight  2023 22:34:00 96.616 kg                 [facility]

[facility] of



                                                                 [facility]



                                                                 [facility]

 

             BMI          2023 22:34:00 32.39 kg/m2               [facility]

[facility] of



                                                                 [facility]



                                                                 [facility]

 

             Systolic blood 2023 17:17:00 112 mm[Hg]                [facility]

[facility]ity of



             pressure                                            [facility]



                                                                 [facility]

 

             Diastolic blood 2023 17:17:00 72 mm[Hg]                 [facility]

rsity of



             pressure                                            [facility]



                                                                 [facility]

 

             Heart rate   2023 17:17:00 85 /min                   [facility]

[facility] of



                                                                 [facility]



                                                                 [facility]

 

             Body temperature 2023 17:17:00 36.61 [facility]                 [facility]

ers[facility] of



                                                                 [facility]



                                                                 [facility]

 

             Respiratory rate 2023 17:17:00 18 /min                   [facility]

[facility] of



                                                                 [facility]



                                                                 [facility]

 

             Body height  2023 17:17:00 172.7 cm                  [facility]

[facility] of



                                                                 [facility]



                                                                 [facility]

 

             Body weight  2023 17:17:00 96.662 kg                 [facility]

[facility]



                                                                 [facility]



                                                                 [facility]

 

             BMI          2023 17:17:00 32.40 kg/m2               [facility]

[facility] of



                                                                 [facility]



                                                                 [facility]

 

             Systolic blood 2022 07:00:00 117 mm[Hg]                [facility]

[facility]ity of



             pressure                                            [facility]



                                                                 [facility]

 

             Diastolic blood 2022 07:00:00 64 mm[Hg]                 [facility]

rsity of



             pressure                                            [facility]



                                                                 [facility]

 

             Heart rate   2022 07:00:00 87 /min                   [facility]

[facility] of



                                                                 [facility]



                                                                 [facility]

 

             Respiratory rate 2022 07:00:00 17 /min                   [facility]

[facility] of



                                                                 [facility]



                                                                 [facility]

 

             Oxygen saturation in 2022 07:00:00 100 /min                  

[facility]



             Arterial blood by                                        [facility]

[facility]



             Pulse oximetry                                        [facility]

 

             Body height  2022 05:00:00 172.7 cm                  [facility]

[facility] of



                                                                 [facility]



                                                                 [facility]

 

             Body weight  2022 05:00:00 96.616 kg                 [facility]

[facility] of



                                                                 [facility]



                                                                 [facility]

 

             BMI          2022 05:00:00 32.39 kg/m2               [facility]

[facility] of



                                                                 [facility]



                                                                 [facility]

 

             Body temperature 2022 04:44:00 36.17 [place]                 [facility]

ers[facility] of



                                                                 [facility]



                                                                 [facility]

 

             Systolic blood 2022 15:03:00 103 mm[Hg]                [facility]

[facility]ity of



             pressure                                            [facility]



                                                                 [facility]

 

             Diastolic blood 2022 15:03:00 67 mm[Hg]                 [facility]

rsity of



             pressure                                            [facility]



                                                                 [facility]

 

             Heart rate   2022 15:03:00 77 /min                   [facility]

[facility]



                                                                 [facility]



                                                                 [facility]

 

             Body temperature 2022 15:03:00 36.78 [facility]                 [facility]

[facility]



                                                                 [facility]



                                                                 [facility]

 

             Respiratory rate 2022 15:03:00 18 /min                   [facility]

[facility] of



                                                                 [facility]



                                                                 [facility]

 

             Body height  2022 15:03:00 172.7 cm                  [facility]

[facility]



                                                                 [facility]



                                                                 [facility]

 

             Oxygen saturation in 2022 15:03:00 100 /min                  

[facility]



             Arterial blood by                                        [facility]

[facility]



             Pulse oximetry                                        [facility]

 

             Systolic blood 2022 19:18:00 117 mm[Hg]                [facility]

[facility]ity of



             pressure                                            [facility]



                                                                 [facility]

 

             Diastolic blood 2022 19:18:00 69 mm[Hg]                 [facility]

rsity of



             pressure                                            [facility]



                                                                 [facility]

 

             Heart rate   2022 19:18:00 60 /min                   [facility]

[facility]



                                                                 [facility]



                                                                 [facility]

 

             Body temperature 2022 19:18:00 37.11 [place]                 [facility]

[facility] of



                                                                 [facility]



                                                                 [facility]

 

             Respiratory rate 2022 19:18:00 18 /min                   [facility]

ers[facility] of



                                                                 [facility]



                                                                 [facility]

 

             Body height  2022 19:18:00 172.7 cm                  [facility]

[facility]



                                                                 [facility]



                                                                 [facility]

 

             Body weight  2022 19:18:00 96.662 kg                 [facility]

[facility] of



                                                                 [facility]



                                                                 [facility]

 

             BMI          2022 19:18:00 32.40 kg/m2               [facility]

[facility] of



                                                                 [facility]



                                                                 [facility]

 

             Oxygen saturation in 2022 19:18:00 98 /min                   

[facility]



             Arterial blood by                                        [facility]

[facility]



             Pulse oximetry                                        [facility]

 

             Systolic blood 2022-10-27 15:59:00 102 mm[Hg]                [facility]

[facility]ity of



             pressure                                            [facility]



                                                                 [facility]

 

             Diastolic blood 2022-10-27 15:59:00 50 mm[Hg]                 [facility]

rsity of



             pressure                                            [facility]



                                                                 [facility]

 

             Heart rate   2022-10-27 15:59:00 72 /min                   [facility]

[facility] of



                                                                 [facility]



                                                                 [facility]

 

             Body temperature 2022-10-27 15:59:00 35.94 [facility]                 [facility]

[facility] of



                                                                 [facility]



                                                                 [facility]

 

             Body height  2022-10-27 15:59:00 172.7 cm                  [facility]

[facility] of



                                                                 [facility]



                                                                 [facility]

 

             Body weight  2022-10-27 15:59:00 95.255 kg                 [facility]

[facility] of



                                                                 [facility]



                                                                 [facility]

 

             BMI          2022-10-27 15:59:00 31.93 kg/m2               [facility]

[facility] of



                                                                 [facility]



                                                                 [facility]

 

             Systolic blood 2022-10-12 14:12:00 108 mm[Hg]                [facility]

sity of



             pressure                                            [facility]



                                                                 [facility]

 

             Diastolic blood 2022-10-12 14:12:00 71 mm[Hg]                 [facility]

rsity of



             pressure                                            [facility]



                                                                 [facility]

 

             Heart rate   2022-10-12 14:12:00 57 /min                   [facility]

[facility] of



                                                                 [facility]



                                                                 [facility]

 

             Body temperature 2022-10-12 14:12:00 36.94 [facility]                 [facility]

ers[facility] of



                                                                 [facility]



                                                                 Branch

 

             Respiratory rate 2022-10-12 14:12:00 18 /min                   [facility]

ers[facility] of



                                                                 [facility]



                                                                 [facility]

 

             Body height  2022-10-12 14:12:00 170.2 cm                  [facility]

[facility] of



                                                                 [facility]



                                                                 [facility]

 

             Body weight  2022-10-12 14:12:00 95.89 kg                  [facility]

[facility] of



                                                                 [facility]



                                                                 [facility]

 

             BMI          2022-10-12 14:12:00 33.11 kg/m2               [facility]

[facility] of



                                                                 [facility]



                                                                 [facility]

 

             Systolic blood 2022 16:15:00 108 mm[Hg]                [facility]

sity of



             pressure                                            [facility]



                                                                 [facility]

 

             Diastolic blood 2022 16:15:00 72 mm[Hg]                 [facility]

rsity of



             pressure                                            [facility]



                                                                 [facility]

 

             Heart rate   2022 16:15:00 91 /min                   [facility]

[facility]



                                                                 [facility]



                                                                 [facility]

 

             Body temperature 2022 16:15:00 36.5 [place]                  [facility]

ers[facility] of



                                                                 [facility]



                                                                 [facility]

 

             Respiratory rate 2022 16:15:00 17 /min                   [facility]

 

             Body height  2022 16:15:00 172.7 cm                  [facility]

[facility] of



                                                                 [facility]

 

             Body weight  2022 16:15:00 97.098 kg                 [facility]

[facility] of



                                                                 [facility]

 

             BMI          2022 16:15:00 32.55 kg/m2               [facility]

[facility] of



                                                                 [facility]

 

             Systolic blood 2022 00:30:00 111 mm[Hg]                Univer

sity of



             pressure                                            [facility]

 

             Diastolic blood 2022 00:30:00 75 mm[Hg]                 Unive

rsity of



             [facility]

 

             Heart rate   2022 00:30:00 60 /min                   [facility]

[facility] of



                                                                 [facility]

 

             Respiratory rate 2022 00:30:00 16 /min                   [facility]

 

             Oxygen saturation in 2022 00:30:00 100 /min                  

[facility]



             Arterial blood by                                        [facility]

[facility]



             Pulse oximetry                                        [facility]anch

 

             Body temperature 2022 21:46:00 36.56 [place]                 [facility]

 

             Body height  2022 21:45:00 172.7 cm                  Univers[facility]

ty of



                                                                 [facility]

 

             Body weight  2022 21:45:00 92.534 kg                 Universi

ty of



                                                                 [facility]

 

             BMI          2022 21:45:00 31.02 kg/m2               Universi

ty of



                                                                 [facility]

 

             HEIGHT       2021 06:18:00 172.7 cm                  

 

             WEIGHT       2021 06:18:00 151.864 kg                

 

             HEIGHT       2021-10-29 10:40:00 172.7 cm                  

 

             WEIGHT       2021-10-29 10:40:00 153.316 kg                

 

             HEIGHT       2021 06:18:00 172.7 cm                  

 

             WEIGHT       2021 06:18:00 151.864 kg                

 

             HEIGHT       2021-10-29 10:40:00 172.7 cm                  

 

             WEIGHT       2021-10-29 10:40:00 153.316 kg                

 

             HEIGHT       2021-10-28 12:41:00 172.7 cm                  

 

             WEIGHT       2021-10-28 12:41:00 153.452 kg                

 

             HEIGHT       2021-10-28 12:41:00 172.7 cm                  

 

             WEIGHT       2021-10-28 12:41:00 153.452 kg                







Procedures







                Procedure       Date / Time     Performing Clinician Source



                                Performed                       

 

                POCT PREGNANCY TEST 2023 15:48:00 Estela Spangler  [facility]

 

                DISCLOSURE AND CONSENT, 2023 05:01:00 Doctor Unassigned, N

o [facility]



                MEDICAL AND SURGICAL                 Name            Medical Bra

nch



                PROCEDURES                                      

 

                SURGICAL PATHOLOGY EXAM 2023-08-15 18:01:00 Bhavik Olson  [facility]

 

                PAP SMEAR-LIQUID 2023-08-15 17:43:00 Bhavik Olson  [facility]



                BASED-CP                        [facility]

 

                POCT PREGNANCY TEST 2023-08-15 17:04:00 Lazaro Kennedy [facility]

 

                DISCLOSURE AND CONSENT, 2023-08-15 05:01:00 Doctor Unassigned, N

o [facility] AND SURGICAL                 Name            Medical Bra

nch



                PROCEDURES                                      

 

                URINALYSIS      2023 04:04:00 Syed [facility]

 

                POCT PREGNANCY TEST 2023 04:00:00 Syed [facility]

 

                CONSENT/REFUSAL FOR 2023 03:23:41 Doctor Unassigned, No Un

[facility]



                DIAGNOSIS AND TREATMENT                 [facility]

 

                CBC WITH DIFF   2023 19:57:00 Chidi Simon [facility]

 

                CBC WITH DIFF   2023-03-10 09:45:00 Mercedez Rivera  [facility]

 

                CBC WITH DIFF   2023-03-10 09:45:00 Mercedez Rivera  [facility]

 

                VENOUS CORD GAS 2023-03-10 02:15:00 McAlpin [facility]

 

                VENOUS CORD GAS 2023-03-10 02:15:00 [facility]

 

                 SECTION 2023-03-10 01:07:00 Nicolas [facility]

 

                 SECTION 2023-03-10 01:07:00 Nicolas [facility]

 

                HEPATITIS B SURFACE 2023 22:21:00 McAlpin Agustina   [facility]

[facility] [place]



                ANTIGEN                                         [facility]

 

                HB ABO GROUPING 2023 22:21:00 [facility]

 

                RHO (D) IMMUNE GLOBULIN 2023 22:21:00 [facility]

 

                EXTRA TUBE LAV  2023 22:21:00 Deana Gimenez [facility]

 

                SYPHILIS IGG/IGM 2023 22:21:00 [facility]

 

                HEPATITIS B SURFACE 2023 22:21:00 [facility]



                ANTIGEN                                         [facility]

 

                HB ABO GROUPING 2023 22:21:00 [facility] o

f [facility]

 

                RHO (D) IMMUNE GLOBULIN 2023 22:21:00 Mercedez [facility]

 

                EXTRA TUBE LAV  2023 22:21:00 Deana Gimenez [facility]

 

                SYPHILIS IGG/IGM 2023 22:21:00 [facility]

 

                HOSPITAL ADMISSION 2023 06:01:00 [facility]igned, No [facility]

 

                PO[facility]INALYSIS 2023 19:12:00 Autumn Bond [facility]

 

                POCT URINALYSIS 2023 19:53:00 Autumn Bond [facility]

 

                POCT URINALYSIS 2023-02-15 19:17:00 [place]umn [facility]

 

                STERILIZATION CONSENT 2023-02-15 06:01:00 Doctor Unassigned, No 

[facility]

 

                POCT URINALYSIS 2023-02-15 00:00:00 Autumn Bond [facility]

 

                URINALYSIS      2023 03:13:00 Farhan Bernardo   [place] o

f [facility]

 

                CONSENT/REFUSAL FOR 2023 00:15:10 Doctor Unassigned, No Un

[facility] 

[place]



                DIAGNOSIS AND TREATMENT                 [facility]

 

                CONSENT/REFUSAL FOR 2023 06:01:00 Doctor Unassigned, No Un

iversity [facility]



                DIAGNOSIS AND TREATMENT                 [facility]

 

                CONSENT/REFUSAL FOR 2023 06:01:00 Doctor Unassigned, No Un

[facility] 

[place]



                DIAGNOSIS AND [facility]

 

                POCT URINALYSIS 2023 17:27:00 Autumn Bond [facility]

 

                URINALYSIS      2023 23:10:00 Mindy Murray [facility]

 

                CONSENT/REFUSAL FOR 2023 22:32:49 Doctor Unassigned, No Un

[facility]



                DIAGNOSIS AND TREATMENT                 Name            [facility]

 

                POCT URINALYSIS 2023 17:25:00 Autumn Bond [facility]

 

                MAGNESIUM       2022 05:43:00 Mercedez Rivera  [facility]

 

                BASIC METABOLIC PANEL 2022 05:43:00 [facility]  [facility]

[facility] [place]



                (NA, K, CL, CO2,                                 [facility]



                GLUCOSE, BUN,                                   



                CREATININE, CA)                                 

 

                CBC WITH DIFF   2022 05:43:00 [facility]

 

                URINE CULTURE   2022 22:26:00 Sayda Dean [facility]

 

                GALV ONLY - VAGINAL 2022 22:26:00 Sayda Dean [facility]



                PATHOGENS BY NUCLEIC                                 [facility]



                ACID TESTING                                    

 

                CBC WITH DIFF   2022 15:57:00 Sayda Dean [facility]

 

                GLYCOSYLATED HEMOGLOBIN 2022 15:57:00 Sayda Dean [facility]



                (A1C)                                           [facility]

 

                FLU VACC ([id number]), 6 2022 19:30:22 Sangeeta Robert [facility]



                MO-64 YRS, .5ML, IM,                                 [facility]



                QUAD (FLUCELVAX)                                 

 

                POCT URINALYSIS 2022 19:20:00 Autumn Bond [facility]

 

                SECOND AND THIRD 2022 17:17:00 Autumn [facility]



                TRIMESTER ULTRASOUND                                 [facility]

 

                SECOND AND THIRD 2022 17:08:00 Autumn [facility]



                TRIMESTER ULTRASOUND                                 [facility]

 

                DISCLOSURE AND CONSENT, 2022-10-28 05:01:00 Doctor Unassigned, N

o [facility]



                MEDICAL AND SURGICAL                 Name            Medical Bra

nch



                PROCEDURES                                      

 

                POCT URINALYSIS 2022-10-12 14:14:00 Autumn Bond [facility]

 

                POCT URINALYSIS 2022 16:18:00 Autumn Bond [facility]

 

                ASSIGNMENT OF BENEFITS 2022 22:29:21 Doctor Unassigned, No

 [facility]



                                                Name            [facility]

 

                LIPASE          2022 22:21:00 Татьяна Culver [facility]

 

                COMP. METABOLIC PANEL 2022 22:21:00 Татьяна Culver [facility]

[facility] [place]



                ([id number])                                         [facility]

 

                CBC WITH DIFF   2022 22:21:00 Татьяна Culver [facility]

 

                URINALYSIS      2022 22:21:00 Татьяна Culver [facility]

 

                CONSENT/REFUSAL FOR 2022 21:42:12 Doctor Unassigned, No Un

[facility]



                DIAGNOSIS AND TREATMENT                 [facility]

 

                FIRST TRIMESTER 2022 15:02:00 Autumn Bond [facility]



                ULTRASOUND                                      [facility]







Plan of Care







             Planned Activity Planned Date Details      Comments     Source

 

             Future Scheduled 2025   DTAP/TDAP/TD VACCINES (3             

 [facility]



             Test         00:00:00     - Td or Tdap) [code =              Medica

l Center



                                       DTAP/TDAP/TD VACCINES (3              



                                       - Td or Tdap)]              

 

             Future Scheduled 2025   DTAP/TDAP/TD VACCINES (3             

 [facility]



             Test         00:00:00     - Td or Tdap) [code =              Medica

l Center



                                       DTAP/TDAP/TD VACCINES (3              



                                       - Td or Tdap)]              

 

             Future Scheduled 2025   DTAP/TDAP/TD VACCINES (3             

 [facility]



             Test         00:00:00     - Td or Tdap) [code =              Medica

l Center



                                       DTAP/TDAP/TD VACCINES (3              



                                       - Td or Tdap)]              

 

             Future Scheduled 2024   Lipid panel (procedure)              

[facility]



             Test         00:00:00     [code = 16786396]              Medical Ce

nter

 

             Future Scheduled 2024   Lipid panel (procedure)              

[facility]



             Test         00:00:00     [code = 83318087]              Medical Ce

nter

 

             Future Scheduled 2024   Lipid panel (procedure)              

[facility]



             Test         00:00:00     [code = 26774060]              Medical Ce

nter

 

             Future Scheduled 2023   Influenza Vaccine (#1)              C

[facility]



             Test         00:00:00     [code = Influenza Vaccine              Me

dical Center



                                       (#1)]                     

 

             Future Scheduled 2023   INFLUENZA VACCINE (Season            

  [facility]



             Test         00:00:00     Ended) [code = INFLUENZA              Med

ical Center



                                       VACCINE (Season Ended)]              

 

             Future Scheduled 2023   Influenza Vaccine (#1)              C

[facility]



             Test         00:00:00     [code = Influenza Vaccine              Me

dical Center



                                       (#1)]                     

 

             Future Scheduled 2023   DEPRESSION SCREENING              [facility]



             Test         00:00:00     (12+) [code = DEPRESSION              Med

ical Center



                                       SCREENING (12+)]              

 

             Future Scheduled 2023   DEPRESSION SCREENING              [facility]



             Test         00:00:00     (12+) [code = DEPRESSION              Med

ical Center



                                       SCREENING (12+)]              

 

             Future Scheduled 2023   DEPRESSION SCREENING              [facility]



             Test         00:00:00     (12+) [code = DEPRESSION              Med

ical Center



                                       SCREENING (12+)]              

 

             Future Scheduled 2022   Tobacco Cessation              [facility]



             Test         00:00:00     Counseling and Screening              Med

ical Center



                                       (12+) [code = Tobacco              



                                       Cessation Counseling and              



                                       Screening (12+)]              

 

             Future Scheduled 2022   Tobacco Cessation              [facility]



             Test         00:00:00     Counseling and Screening              Med

ical Center



                                       (12+) [code = Tobacco              



                                       Cessation Counseling and              



                                       Screening (12+)]              

 

             Future Scheduled 2022   Tobacco Cessation              [facility]



             Test         00:00:00     Counseling and Screening              Med

ical Center



                                       (12+) [code = Tobacco              



                                       Cessation Counseling and              



                                       Screening (12+)]              

 

             Future Scheduled 2016   MEDICARE ANNUAL WELLNESS             

 [facility]



             Test         00:00:00     (YEAR 2 or FIRST YEAR if              Med

ical Center



                                       no IPPE) [code = MEDICARE              



                                       ANNUAL WELLNESS (YEAR 2              



                                       or FIRST YEAR if no              



                                       IPPE)]                    

 

             Future Scheduled 2016   MEDICARE ANNUAL WELLNESS             

 [facility]



             Test         00:00:00     (YEAR 2 or FIRST YEAR if              Med

ical Center



                                       no IPPE) [code = MEDICARE              



                                       ANNUAL WELLNESS (YEAR 2              



                                       or FIRST YEAR if no              



                                       IPPE)]                    

 

             Future Scheduled 2016   MEDICARE ANNUAL WELLNESS             

 [facility]



             Test         00:00:00     (YEAR 2 or FIRST YEAR if              Med

ical Center



                                       no IPPE) [code = MEDICARE              



                                       ANNUAL WELLNESS (YEAR 2              



                                       or FIRST YEAR if no              



                                       IPPE)]                    

 

             Future Scheduled 2012   Screening for malignant              

[facility]



             Test         00:00:00     neoplasm of cervix              Medical C

enter



                                       (procedure) [code =              



                                       000029588]                

 

             Future Scheduled 2012   Screening for malignant              

[facility]



             Test         00:00:00     neoplasm of cervix              Medical C

enter



                                       (procedure) [code =              



                                       295529187]                

 

             Future Scheduled 2012   Screening for malignant              

[facility]



             Test         00:00:00     neoplasm of cervix              Medical C

enter



                                       (procedure) [code =              



                                       149561081]                

 

             Future Scheduled 2009   HEPATITIS C SCREENING              [facility]



             Test         00:00:00     [code = HEPATITIS C              Medical 

Center



                                       SCREENING]                

 

             Future Scheduled 2009   HEPATITIS C SCREENING              [facility]



             Test         00:00:00     [code = HEPATITIS C              Medical 

Center



                                       SCREENING]                

 

             Future Scheduled 2009   HEPATITIS C SCREENING              [facility]



             Test         00:00:00     [code = HEPATITIS C              Medical 

Center



                                       SCREENING]                

 

             Future Scheduled 2006   Human immunodeficiency              C

[facility]



             Test         00:00:00     virus screening              Medical Cent

er



                                       (procedure) [code =              



                                       146132245]                

 

             Future Scheduled 2006   Human immunodeficiency              C

[facility]



             Test         00:00:00     virus screening              Medical Cent

er



                                       (procedure) [code =              



                                       229406152]                

 

             Future Scheduled 1991   COVID-19 VACCINE (#1)              [facility]



             Test         00:00:00     [code = COVID-19 VACCINE              Med

ical Center



                                       (#1)]                     

 

             Future Scheduled 1991   COVID-19 VACCINE (#1)              [facility]



             Test         00:00:00     [code = COVID-19 VACCINE              Med

ical Center



                                       (#1)]                     

 

             Future Scheduled 1991   COVID-19 VACCINE (#1)              [facility]



             Test         00:00:00     [code = COVID-19 VACCINE              Med

ical Center



                                       (#1)]                     







Encounters







        Start   End     Encounter Admission Attending Care    Care    Encounter 

Source



        Date/Time Date/Time Type    Type    Clinicians Facility Department ID   

   

 

        2023         Outpatient                 [facility]     [id number]

 [place]



        12:27:37                                                 [id number] [facility]

 

        2022         Outpatient P               [facility]    RODRIGO     [id number]

 Univers



        01:56:16                                                         ity [facility]

 

        2022         Outpatient                 [facility]     [id number]

 [facility]



        06:20:31                                                 [id number] [facility]

 

        2021-10-29         Emergency                 [facility]    [id number] 

Univers



        23:30:59                                                         ity [facility]

 

        2023-10-23 2023-10-23 Outpatient R               [facility]    [id number]

[id number] Univers



        13:00:00 13:00:00                                                 ity of



                                                                        [facility]

 

        2023-10-09 2023-10-09 [facility]         [facility]    [id number]

[id number] Univers



        00:00:00 00:00:00 Secure Msg         [place]idi [place]   [id number]     

    ity of



                                                [place]    [id number]         [place]

[place]



                                                FAM[place]  [phone]         [address]                  

 

        2023 Telephone         [facility]    [id number]

[id number] Univers



        00:00:00 00:00:00                 [place]idi [place]   350.1.13.10        

 ity of



                                                [place]ITY    [id number]         [place]

[place]



                                                FAMILY  [phone]         [address]                  

 

        2023 Telephone         CARLY Spangler [id number]

[id number] Univers



        00:00:00 00:00:00                 Estela   HEALTH 350.1.13.10         i

ty of



                                                CLINICS [id number]         [place]

[place]



                                                        [phone]         [address]

 

        2023 Case            Moustapha  [facility]IT [id number]

[id number] Univers



        00:00:00 00:00:00 Management         Janay   Y HEALTH [id number]       

  ity of



                                                CLINICS [id number]         [place]

[place]



                                                        [phone]         [address]

 

        2023 Outpatient R       DINA [facility]    [id number]

[id number] Univers



        10:00:00 11:28:18                 ESTELA                          ity of



                                                                        [facility]

 

        2023 Office          Res-Colpo/Leep, [facility] UNIVERSI

T [id number] 

[id number]                               Univers



        10:00:00 11:28:18 Visit           Estela Spangler Y HEALTH [id number]   

      ity of



                                                CLINICS [id number]         [place]

[place]



                                                        [phone]         Medi

neil



                                                        [address]

 

        2023 Orders          Doctor  CYDNEY    [id number]

[id number] Univers



        00:00:00 00:00:00 Only            Unassigned, KYM   350.1.13.10       

  ity of



                                        [facility] [id number]         Carlos

as



                                                        [phone]         Medi

neil



                                                        [address]             [address]

 

        2023 Patient         Doctor  CYDNEY    [id number]

[id number] Univers



        00:00:00 00:00:00 Secure Msg         Unassigned, KYM   350.1.13.10    

     ity of



                                        [facility] [id number]         Carlos

as



                                                        [phone]         Medi

neil



                                                        [address]             [address]

 

        2023 Patient         Doctor  CARLY [id number]

[id number] Univers



        00:00:00 00:00:00 Secure Msg         Unassigned, Y HEALTH 350.1.13.10   

      ity of



                                        No Name CLINICS [id number]         [place]

[place]



                                                        [phone]         Medi

neil



                                                        [address]             [address]

 

        2023 Telephone         CARLY Gerard [id number]

[id number] Univers



        00:00:00 00:00:00                 Janay   Y HEALTH 350.1.13.10         i

ty of



                                                CLINICS [id number]         [place]

[place]



                                                        [phone]         [address]

 

        2023 Telephone         Res-Colpo/L UNIVERSIT 1.2.840.11

[id number] 

Univers



        00:00:00 00:00:00                 eep,    Y HEALTH 350.1.13.10         i

ty of



                                        [facility] CLINICS 4.2.7.2.686         Te

x[place]



                                                        [phone]         Medi

neil



                                                        113             [place]

 

        2023-08-15 2023-08-15 Outpatient R       KENNEDY [facility]    [id number]

7787 Univers



        11:00:00 12:55:12                 LAZARO                            [facility]

 

        2023-08-15 2023-08-15 Office          Res-Colpo/Leep, [facility] UN[facility]

T 1.2.840.114 

[id number]                               Univers



        11:00:00 12:55:12 Visit           Lazaro Kennedy [facility] 350.1.13.10  

       ity of



                                                [facility] 4.2.7.2.686         [place]

[place]



                                                        [phone]         [address]

 

        2023-08-15 2023-08-15 Orders          Doctor  CYDNEY    1.2.840.114 [id number]

413 Univers



        00:00:00 00:00:00 Only            Unassigned, KYM   350.1.13.10       

  ity of



                                        [facility] 4.2.7.2.686         Carlos

as



                                                        [phone]         Medi

neil



                                                        009             [place]

 

        2023-07-15 2023 Emergency X       [facility]     [id number]

5017 Univers



        22:23:00 00:54:00                 [facility]

 

        2023-07-15 2023 Emergency         [facility]    1.2.840.114 10

[id number] Univers



        22:23:00 00:54:00                 [facility]  350.1.13.10         it

y of



                                                [facility]  4.2.7.2.686         [place]

[place]



                                                CITY    [phone]         [address]                 



                                                ([facility])                   

 

        2023 Outpatient R               [facility]    [id number]

019 Univers



        08:00:00 08:00:00                                                 ity of



                                                                        [facility]

 

        2023 Telephone         [facility]    1.2.840.114 10

[id number] Univers



        00:00:00 00:00:00                 Chidi OB/GYN  350.1.13.10        

 ity of



                                                REGIONAL 4.2.7.2.686         Carlos

as



                                                MATERNAL [phone]         Med

ical



                                                & CHILD [address] [place], [place] [address]TE                 



                                                XAS [facility]                 

 

        2023 Telephone         CARLY Gerard 1.2.840.114 10

[id number] Univers



        00:00:00 00:00:00                 [facility] 350.1.13.10         i

ty of



                                                CLINICS 4.2.7.2.686         [place]

[place]



                                                        [phone]         [address]

 

        2023 Outpatient R       [facility]URROA [facility]    [id number]

67581 Univers



        13:30:00 14:58:25                 [facility]

 

        2023 Office          [facility]    1.2.233.687 2537

33287 Univers



        13:30:00 14:58:25 Visit           Chidi OB/GYN  350.1.13.10        

 ity of



                                                REGIONAL 4.2.7.2.686         Carlos

as



                                                MATERNAL [phone]         Med

ical



                                                & CHILD [address] [place] [address]                 

 

        2023 Outpatient R       [facility]    [id number]

[id number] Univers



        10:30:00 10:49:20                 [facility]

 

        2023 Routine         [facility]    1.2.265.865 6168

63281 Univers



        10:30:00 10:49:20 Prenatal         Chidi OB/GYN  350.1.13.10       

  ity of



                        Visit                   REGIONAL 4.2.7.2.686         Carlos

as



                                                MATERNAL [phone]         Med

[facility]



                                                & CHILD [address] [place] [address]                 

 

        2023 Outpatient R       [facility]    [id number]

23170 Univers



        13:00:00 13:34:48                 [facility]

 

        2023 Nurse           [facility]    1.2

.840.114 [id number] 

Univers



        13:00:00 13:34:48 Visit           Chidi Simon [facility]  350.1.13.

10         ity of



                                                REGIONAL 4.2.7.2.686         Carlos

as



                                                MATERNAL [phone]         Med

ical



                                                & CHILD [address] [place] [address]                 

 

        2023 Kassi Dean [facility]    1.2.840.114 [id number]

064 Univers



        00:00:00 00:00:00                 Sayda RIZO OB/GYN  350.1.13.10         i

ty of



                                                REGIONAL 4.2.7.2.686         Carlos

as



                                                MATERNAL [phone]         Med

ical



                                                & CHILD [address] [place] [address]                 

 

        2023 Inpatient P       DEANA GIMENEZ [facility]    RODRIGO     1

[id number] Univers



        12:42:00 10:56:00                 DEANA GIMENEZ                        

 itkenneth [facility]

 

        2023 Hospital         CYDNEY Gimenez    1.2.840.114 [id number]

4587 Univers



        12:42:00 10:56:00 Encounter         Deana MEJÍA   350.1.13.10       

  ity of



                                                [address]2.7.2.686         Carlos

as



                                                        [phone]         Medi

neil



                                                        134             [place]

 

        2023 Surgery         CYDNEY Valenzuela    1.2.840.114 [id number]

664 Univers



        17:45:00 19:46:00                 Liz MEJÍA   350.1.13.10         

ity of



                                                [place] [address].7.2.686         Carlos

as



                                                        [phone]         Medi

neil



                                                        013             [place]

 

        2023 Nurse           Katya Jackson    1.2.840.114 10

[id number] Univers



        00:00:00 00:00:00 Triage                  KYM   350.1.13.10         it

y of



                                                [address].7.2.686         Carlos

as



                                                        [phone]         Medi

neil



                                                        019             [place]

 

        2023 Orders          Doctor  CYDNEY    1.2.840.114 [id number]

847 Univers



        00:00:00 00:00:00 Only            Unassigned, KYM   350.1.13.10       

  ity of



                                        [place] [address]2.7.2.686         Carlos

as



                                                        [phone]         Medi

neil



                                                        009             [place]

 

        2023 Outpatient SONIA DEAN [facility]    [id number]

497 Univers



        13:00:00 13:54:50                 SAYDA lucas [facility]

 

        2023 Routine         Risk, Dic-Rmchp High [facility]    1.2.8

40.114 [id number] 

Univers



        13:00:00 13:54:50 Prenatal         Brandi Renteria OB/GYN  350.1.13.10

         ity of



                        Visit           Sayda Dean [facility] 4.2.7.2.686   

      [place]



                                                MATERNAL [phone]         Med

ical



                                                & CHILD 111             [facility]                 

 

        [facility]    [id number]

203 Univers



        14:15:00 14:23:36                 SAYDA                          slickkenneth [facility]

 

        2023 Routine         Risk, Carlos-Rmchp Physician/High [place]

B    1.2.840.114 

[id number]                               Univers



        14:15:00 14:23:36 Prenatal         Sayda Dean OB/GYN  350.1.13.10 

        ity of



                        Visit                   REGIONAL 4.2.7.2.686         Carlos

as



                                                MATERNAL [phone]         Med

ical



                                                & CHILD [address] [place] [address]                 

 

        2023-02-15 2023-02-15 Outpatient SONIA DEAN [facility]    [id number]

798 Univers



        12:45:00 13:53:37                 SAYDA                          [facility]

 

        2023-02-15 2023-02-15 Routine         Risk, Carlos-Rmchp Physician/High [facility]

B    1.2.840.114 

[id number]                               Univers



        12:45:00 13:53:37 Prenatal         Sayda Dean OB/GYN  350.1.13.10 

        ity of



                        Visit                   REGIONAL 4.2.7.2.686         Carlos

as



                                                MATERNAL [phone]         Med

ical



                                                & CHILD [address] [place] [address]                 

 

        2023-02-15 2023-02-15 Orders          Doctor LAMAS    1.2.840.114 [id number]

619 Univers



        00:00:00 00:00:00 Only            Unassigned, KYM   350.1.13.10       

  ity of



                                        [facility] 4.2.7.2.686         Carlos

as



                                                        [phone]         [address]

 

        2023 Outpatient X       CANDIS MALIN [facility]    RODRIGO 

    [id number] 

Univers



        18:20:00 23:57:00                 DUGLASDECANDIS HAY                    

     lance [facility]

 

        2023 Emergency         ElijahCYDNEY hay    1.2.840.114 

[id number] Univers



        18:20:00 23:57:00                 Candis MEJÍA   350.1.13.10         it

y of



                                                HOSPITAL 4.2.7.2.686         Carlos

as



                                                        [phone]         [address]

 

        2023 Telephone         Risk,   [facility]    1.2.427.748 [id number]

[id number] Univers



        00:00:00 00:00:00                 Carlos-Rmchp OB/GYN  350.1.13.10         

ity of



                                        Physician/H REGIONAL 4.2.7.2.686        

 [place]



                                        igh     MATERNAL [phone]         Med

[facility]



                                                & CHILD [address] [place] [address]                 

 

        2023 Outpatient SONIA DEAN [facility]    [id number]

490 Univers



        10:30:00 11:55:24                 SAYDA lucas [facility]

 

        2023 Routine         Risk, Carlos-Rmchp Physician/High [facility]

B    1.2.840.114 

[id number]                                Univers



        10:30:00 11:55:24 Prenatal         Sayda Dean OB/GYN  350.1.13.10 

        ity of



                        Visit                   REGIONAL 4.2.7.2.686         Carlos

as



                                                MATERNAL [phone]         Med

[facility]



                                                & CHILD [address] [place] [address]                 

 

        2023 Outpatient SONIA DEAN [facility]    [id number]

511 Univers



        10:45:00 10:45:00                 [facility]

 

        2023 Outpatient P       NICOLAS   [facility]    RODRIGO     [id number]

362 Univers



        16:42:00 20:13:00                 LIZ lucas [facility]

 

        2023 [facility]         CYDNEY Valenzuela    1.2.840.114 [id number]

554 Univers



        16:42:00 20:13:00 Encounter         Liz MEJÍA   350.1.13.10       

  ity of



                                                HOSPITAL 4.2.7.2.686         Carlos

as



                                                        [phone]         Medi

neil



                                                        140             [place]

 

        2023 Outpatient R       [facility]    [id number]

079 Univers



        13:45:00 13:45:00                 SANGEETA lucas [facility]

 

        2023 Outpatient R       [facility]    [id number]

501 Univers



        10:45:00 11:34:36                 SANGEETA                           kenneth [facility]

 

        2023 Routine         Risk, Carlos-Rmchp Physician/High [facility]

B    1.2.840.114 

[id number]                                Univers



        10:45:00 11:34:36 Prenatal         RobertSangeeta watson OB/GYN  350.1.13.10

         ity of



                        Visit                   REGIONAL 4.2.7.2.686         Carlos

as



                                                MATERNAL [phone]         Med

[facility]



                                                & CHILD [address] [place] [address]                 

 

        [facility]     [id number]

7282 Univers



        22:20:00 01:52:00                 EDMONDSON                          [facility]

 

        2022 [facility]         CYDNEY Angel    1.2.383.607 [id number]

6094 Univers



        22:20:00 01:52:00 Encounter         Matthias MEJÍA   350.1.13.10        

 [facility] 4.2.7.2.686         Carlos

as



                                                        [phone]         [address]

 

        2022 Outpatient R       DIANNE [facility]    [id number]

862 Univers



        13:00:00 13:00:00                 SAYDA lucas [facility]

 

        2022 Abstract         [facility]    1.2.840.114 [id number]

746 Univers



        00:00:00 00:00:00                 Sayda RIZO OB/GYN  350.1.13.10         i

ty of



                                                REGIONAL 4.2.7.2.686         Carlos

as



                                                MATERNAL [phone]         Med

ical



                                                & CHILD [address] [place] [address]                 

 

        2022 Technician         4, [facility] Us Room UNIVERSIT 1

.2.840.114 

[id number]                                Univers



        10:15:00 11:50:53 Visit           Vinicio Sims [facility] 350.1.13.10  

       ity of



                                                [facility] 4.2.7.2.686         [place]

[place]



                                                        [phone]         [address]

 

        2022 Outpatient P       KO [facility]    [id number]

04432 Univers



        10:15:00 11:50:53                 [facility]

 

        2022 Case            Dianne [facility]    1.2.840.114 [id number]

53 Univers



        00:00:00 00:00:00 Management         Sayda RIZO OB/GYN  350.1.13.10       

  ity of



                                                REGIONAL 4.2.7.2.686         Carlos

as



                                                MATERNAL [phone]         Med

ical



                                                & CHILD 124             [facility]                 

 

        2022 Telephone         Dianne [facility]    1.2.861.956 [id number]

3208 Univers



        00:00:00 00:00:00                 Sayda RIZO OB/GYN  350.1.13.10         i

ty of



                                                REGIONAL 4.2.7.2.686         Carlos

as



                                                MATERNAL [phone]         Med

ical



                                                & CHILD 130             [facility]                 

 

        2022 Telephone         Risk   [facility]    1.2.575.696 [id number]

7418 Univers



        00:00:00 00:00:00                 Carlos-Rmchp OB/GYN  350.1.13.10         

ity of



                                        Physician/H REGIONAL 4.2.7.2.686        

 [place]



                                        igh     MATERNAL [phone]         Med

ical



                                                & CHILD [address] [place] [address]                 

 

        2022 Outpatient R       DIANNE [facility]    [id number]

405 Univers



        08:45:00 09:57:01                 SAYDA lucas [facility]

 

        2022 Routine         Risk, Carlos-Rmchp Physician/High [place]

B    1.2.840.114 

[id number]                                Univers



        08:45:00 09:57:01 Prenatal         Sangeeta Robert OB/GYN  350.1.13.10

         ity of



                        Visit           Sayda Dean REGIONAL 4.2.7.2.686   

      [place]



                                                MATERNAL [phone]         Med

ical



                                                & CHILD 130             [facility]                 

 

        2022 Telephone         Risk,   [facility]    1.2.125.976 [id number]

8819 Univers



        00:00:00 00:00:00                 Carlos-Rmchp OB/GYN  350.1.13.10         

ity of



                                        Physician/H REGIONAL 4.2.7.2.686        

 [place]



                                        igh     MATERNAL [phone]         Med

ical



                                                & CHILD [address] [place] [address]                 

 

        2022 Outpatient R       DIANNE [facility]    [id number]

311 Univers



        10:45:00 10:45:00                 SAYDA                          ity [facility]

 

        2022 Emergency E       YUMIKO, MHSE    MHSE    7500 

   MH



        16:31:00 20:52:00                 JUANA                          [place] sa[place]



                                                                        [facility]

 

        2022 Telephone         Risk,   [facility]    1.2.159.545 [id number]

8666 Univers



        00:00:00 00:00:00                 Carlos-Rmchp OB/GYN  350.1.13.10         

ity of



                                        Physician/H REGIONAL 4.2.7.2.686        

 [place]



                                        igh     MATERNAL [phone]         Med

ical



                                                & [address]                 

 

        2022 Telephone         Risk,   [facility]    1.2.574.425 [id number]

5731 Univers



        00:00:00 00:00:00                 Carlos-Rmchp OB/GYN  350.1.13.10         

ity of



                                        Physician/H REGIONAL 4.2.7.2.686        

 [place]



                                        igh     MATERNAL [phone]         Med

[facility]



                                                & CHILD [address] [place] [address]                 

 

        2022 Abstract         [facility]    1.2.840.114 [id number]

[id number] Univers



        00:00:00 00:00:00                 Autumn L OB/GYN  350.1.13.10         i

ty of



                                                REGIONAL 4.2.7.2.686         Carlos

as



                                                MATERNAL [phone]         Med

ical



                                                & CHILD 111             [facility]                 

 

        2022 Routine         Risk, Carlos-chp Physician/High [facility]

B    1.2.840.114 

[id number]                                Univers



        13:15:00 13:39:23 Prenatal         Sangeeta Robert OB/GYN  350.1.13.10

         ity of



                        Visit                   REGIONAL 4.2.7.2.686         Carlos

as



                                                MATERNAL [phone]         Med

ical



                                                & CHILD [address] [place] [address]                 

 

        2022 Outpatient P       DEANA GIMENEZ [facility]    

[id number] Univers



        10:45:00 11:31:40                 DEANA GIMENEZ [facility]

 

        2022 Technician         1, [facility] Us Room UNIVERSIT 1

.2.840.114 

[id number]                                Univers



        10:45:00 11:31:40 Visit           Deana Gimenez  HEALTH 350.1.13.10 

        ity of



                                                CLINICS 4.2.7.2.686         [place]

[place]



                                                        [phone]         Medi

neil



                                                        104             [place]

 

        2022-10-28 2022-10-28 Orders          Doctor  CYDNEY    1.2.840.114 [id number]

24 Univers



        00:00:00 00:00:00 Only            Unassigned, KYM   350.1.13.10       

  ity of



                                        [facility] 4.2.7.2.686         Carlos

as



                                                        [phone]         Medi

neil



                                                        009             Branch

 

        2022-10-27 2022-10-27 Outpatient SONIA SERRA [facility]    [id number]

771 Univers



        10:00:00 11:44:46                 LYNNE ulcas [facility]

 

        2022-10-27 2022-10-27 Office          Res-Colpo/Leep, [facility] UNIVERSI

T 1.2.840.114 

[id number]                                Univers



        10:00:00 11:44:46 Visit           Lynne Serra [facility] 350.1.13.10

         ity of



                                                CLINICS 4.2.7.2.686         [place]

[place]



                                                        [phone]         Medi

neil



                                                        113             [place]

 

        2022-10-12 2022-10-12 Outpatient SONIA DEAN [facility]    [id number]

387 Univers



        09:00:00 09:56:12                 SAYDA lucas [facility]

 

        2022-10-12 2022-10-12 Routine         Risk, Carlos-Rmchp Physician/High UTM

B    1.2.840.114 

[id number]                                Univers



        09:00:00 09:56:12 Prenatal         Sayda Dean OB/GYN  350.1.13.10 

        ity of



                        Visit                   REGIONAL 4.2.7.2.686         Carlos

as



                                                MATERNAL [phone]         Med

ical



                                                & CHILD [address] [place] [place] [address]                 

 

        2022 Outpatient R       DIANNE [facility]    [id number]

676 Univers



        10:45:00 11:54:23                 [facility]

 

        2022 Outpatient SONIA DEAN [facility]    [id number]

676 Univers



        10:45:00 11:54:23                 [facility]

 

        2022 Routine         Risk, Carlos-Rmchp Physician/High [facility]

B    1.2.840.114 

[id number]                                Univers



        10:45:00 11:54:23 Prenatal         Sayda Dean [facility]  350.1.13.10 

        ity of



                        Visit                   REGIONAL 4.2.7.2.686         Carlos

as



                                                MATERNAL [phone]         Med

ical



                                                & [address]                 

 

        2022 Emergency X       SHEILA [facility]    ERT     [id number]

072 Univers



        16:49:00 21:14:00                 [facility]

 

        2022 Emergency         Татьяна [facility]    1.2.840

.114 [id number] 

Univers



        16:49:00 21:14:00                 Wayne Sosa [facility]  350.1.13.10   

      ity of



                                                [place]  4.2.7.2.686         [facility]    [phone]         [address]                 



                                                ([facility])                   

 

        2022 Telephone         [facility]    1.2.840.114 

[id number] Univers



        00:00:00 00:00:00                 Autumn L HEALTH  350.1.13.10         i

ty of



                                                FAM[place]  4.2.7.2.686         [place]

[place]



                                                [place]IC[place] [phone]         [address]                  

 

        2022 Abstract         [facility]    1.2.840.114 [id number]

[id number] Univers



        00:00:00 00:00:00                 Autumn L OB/GYN  350.1.13.10         i

ty of



                                                REGIONAL 4.2.7.2.686         Carlos

as



                                                MATERNAL [phone]         [address]                 

 

        2022 Abstract         [facility]    1.2.840.114 9

[id number] Univers



        00:00:00 00:00:00                 Autumn L OB/GYN  350.1.13.10         i

ty of



                                                REGIONAL 4.2.7.2.686         Carlos

as



                                                MATERNAL [phone]         [address]                 

 

        [facility]    [id number]

474 Univers



        09:30:00 10:17:07                 DAISY                          [facility]

 

        2022 Technician         2, [facility] Room UNIVERSIT 1

.2.840.114 

[id number]                                Univers



        09:30:00 10:17:07 Visit           Judd [facility] 350.1.13.10  

       ity [facility] 4.2.7.2.686         [place]

[place]



                                                        [phone]         [address]

 

        2022 Telephone         [facility]    1.2.840.114 

[id number] Univers



        00:00:00 00:00:00                 Autumn L OB/GYN  350.1.13.10         i

ty of



                                                REGIONAL 4.2.7.2.686         Carlos

as



                                                MATERNAL [phone]         [address]                 

 

        2022 Outpatient R       JAKOB [facility]    [id number]

446 Univers



        10:30:00 11:08:35                 SANGEETA                           [facility]

 

        2022 Routine         Risk, Carlos-Rmchp Physician/High [facility]

B    1.2.840.114 

[id number]                                Univers



        10:30:00 11:08:35 Prenatal         Sayda Dean [facility]  350.1.13.10 

        ity of



                        Visit           Sangeeta Robert REGIONAL 4.2.7.2.686  

       [place]



                                                MATERNAL [phone]         Med

ical



                                                & CHILD [address] [place] [address]                 

 

        2022 Emergency X       ALENA, [facility]    ERT     [id number]

210 Univers



        22:11:00 01:33:00                 SHINTA                          ity of



                                                                        [facility]

 

        2022 Emergency         Vincent, TRAUMA  1.2.840.114 [id number]

[id number] Univers



        22:11:00 01:33:00                 [facility]  350.1.13.10         it

y of



                                                        4.2.7.2.686         [place]

[place]



                                                        [phone]         [address]

 

        2022-08-10 2022-08-10 Outpatient R       DIANNE [facility]    [id number]

340 Univers



        14:15:00 14:15:00                 SAYDA lucas [facility]

 

        2022 Patient         Doctor  [facility]    1.2.840.114 [id number]

93 Univers



        00:00:00 00:00:00 Secure Msg         Unassigned, OB/GYN  350.1.13.10    

     ity of



                                        No Name REGIONAL 4.2.7.2.686         Carlos

as



                                                MATERNAL [phone]         [address]                 

 

        2022 Telephone         [facility]    1.2.840.114 

[id number] Univers



        00:00:00 00:00:00                 Autumn L OB/GYN  350.1.13.10         i

ty of



                                                REGIONAL 4.2.7.2.686         Carlos

as



                                                MATERNAL [phone]         Med

ical



                                                & CHILD [address] [place] [address]                 

 

        2022 Patient         [facility]    1.2.840.114 [id number]

[id number] Univers



        00:00:00 00:00:00 Secure Msg         Uatumn L OB/GYN  350.1.13.10       

  ity of



                                                REGIONAL 4.2.7.2.686         Carlos

as



                                                MATERNAL [phone]         Med

ical



                                                & CHILD [address] [place] [address]                 

 

        2022 Refill          [facility]    1.2.840.114 95

[id number] Univers



        00:00:00 00:00:00                 Autumn L OB/GYN  350.1.13.10         i

ty of



                                                REGIONAL 4.2.7.2.686         Carlos

as



                                                MATERNAL [phone]         Med

ical



                                                & CHILD [address] [place] [address]                 

 

        2022 [facility]    1.2.840.114 [id number]

[id number] Univers



        00:00:00 00:00:00 Management         Autumn L OB/GYN  350.1.13.10       

  ity of



                                                REGIONAL 4.2.7.2.686         Carlos

as



                                                MATERNAL [phone]         [address]                 

 

        2022 [facility]    1.2.840.114 

[id number] Univers



        00:00:00 00:00:00                 Autumn L OB/GYN  350.1.13.10         i

ty of



                                                REGIONAL 4..7.2.686         Carlos

as



                                                MATERNAL [phone]         [address]                 

 

        2022 Outpatient R       [facility]    104

[id number] Univers



        14:00:00 15:36:22                 AUTUMN                          ity of



                                                                        [facility]

 

        2022 [facility]    1.2.840.114 [id number]

[id number] Univers



        14:00:00 15:36:22 Prenatal         Autumn L OB/GYN  350.1.13.10         

ity of



                        Visit                   REGIONAL 4.2.7.2.686         Carlos

as



                                                MATERNAL [phone]         Med

ical



                                                & CHILD [address] [place] [address]                 

 

        2022 Outpatient R       [facility]    104

[id number] Univers



        14:00:00 15:36:22                 AUTUMN                          ity [facility]

 

        2022 Orders          Doctor LAMAS    1.2.840.114 [id number]

02 Univers



        00:00:00 00:00:00 Only            Unassigned, KYM   350.1.13.10       

  ity of



                                        [place] [address]2.7.2.686         Carlos

as



                                                        [phone]         [address]

 

        2022 Emergency X       DARSHAN, [facility]    ERT     [id number]

55 Univers



        19:36:00 00:05:00                 STACEY lucas [facility]

 

        2022 Emergency         Darshan, [facility]    1.2.747.015 [id number]

[id number] Univers



        19:36:00 00:05:00                 Stacey  HEALTH  350.1.13.10         i

ty of



                                                [place]  4.2.7.2.686         [facility]    [phone]         [address]                 



                                                ([facility])                   

 

        2022 Emergency X       DARSHAN, [facility]    ERT     [id number]

55 Univers



        19:36:00 00:05:00                 STACEY lucas [facility]

 

        2022-02-10 2022-02-10 Emergency X       MYLENE,  [facility]    ERT     [id number]

31 Univers



        10:00:00 13:50:00                 KEVIN lucas [facility]

 

        2022-02-10 2022-02-10 [facility]    1.2.741.190 [id number]

8625 Univers



        10:00:00 13:50:00                 Paulo HEALTH  350.1.13.10         it

y of



                                                [place]  4.2.7.2.686         [facility]    [phone]         [address]                 



                                                ([facility])                   

 

        2021 Inpatient EL      MATTAR, SLE    Surgery [id number]

52 [facility]



        05:23:00 11:58:00                 SAMER                           

 

        2021-10-29 2021-10-29 Outpatient EL              [facility]    SLE    [id number]

686 SLE



        10:52:32 23:59:00                                                 

 

        2021-10-28 2021-10-28 Outpatient EL      MATTAR, SLE    SLE    [id number]

575 [facility]



        12:15:43 23:59:00                 SAMER                           

 

        2021-10-28 2021-10-28 Outpatient EL      MATTAR, SLE    [facility]    [id number]

535 [facility]



        00:00:00 00:00:00                 SAMER                           

 

        2021-10-28 2021-10-28 Outpatient EL              [facility]    SLE    [id number]

516 [facility]



        00:00:00 00:00:00                                                 

 

        2021 Outpatient R       TRIP, [facility]    [id number]

[id number] Univers



        10:00:00 10:00:00                 ATTENDING                         [facility]

 

        2021 [facility]          [facility]    1.2.840.114 [id number]

186 



        09:42:58 10:17:08 Visit           Corry ALANNAH OB[facility]  350.1.13.10         



                                                REGIONAL 4.2.7.2.686         



                                                MATERNAL [phone]         



                                                & CHILD 111             



                                                HEALTH                  



                                                [facility]                 



                                                [place]                 

 

        2021 [facility] R       [facility]    [id number]

[id number] Univers



        10:00:00 10:00:00                 [facility]

 

        2021 [facility]patient R       [facility]    [id number]

5386 Univers



        13:30:00 13:30:00                 [facility]

 

        2021 Patient         Doctor  CYDNEY    1.2.840.114 [id number]

06 Univers



        00:00:00 00:00:00 Secure Msg         Unassigned, KYM   350.1.13.10    

     ity of



                                        [place] [facility] 4.2.7.2.686         Carlos

as



                                                        [phone]         [address]

 

        2021 [facility] R       [facility]    103

[id number] Univers



        10:45:00 10:45:00                 AUTUMN                          [facility]

 

        2021 Emergency X       CARRIE  [facility]    ERT     [id number]

49 Univers



        13:51:00 14:12:00                 RAISSA                           [facility]

 

        2021 Emergency X       EZE [facility]    ERT     [id number]

89 Univers



        10:49:00 11:18:00                 ASA                         [facility]

 

        2020 Outpatient R       TRIP, [facility]    [id number]

7234 Univers



        20:45:00 20:45:00                 ATTENDING                         [facility]







Results







           Test Description Test Time  Test Comments Results    Result Comments 

Source









                    POCT PREGNANCY TEST 2023 15:48:00 









                      Test Item  Value      Reference Range Interpretation Comme

nts









             POCT PREG (test code = 1605) Negative                              

 

 

             On board controls acceptable with C Line (test code = 3574) Yes    

                                

 

             POCT PREG LOT # (test code = 3575)                                 

       

 

             POCT PREG TEST  DATE (test code = 3576)                      

                  

 

             Lab Interpretation (test code = 96889-7) Normal                    

             



[facility] PREGNANCY [id number] 15:48:00





             Test Item    Value        Reference Range Interpretation Comments

 

             POCT PREG (test code = 1605) Negative                              

 

 

             On board controls acceptable with C Yes                            

        



             Line (test code = 3574)                                        

 

             POCT PREG LOT # (test code = 3575)                                 

       

 

             POCT PREG TEST  DATE (test                                   

     



             code = 3576)                                        

 

             Lab Interpretation (test code = Normal                             

    



             55281-9)                                            



[facility] PREGNANCY [id number] 15:48:00





             Test Item    Value        Reference Range Interpretation Comments

 

             POCT PREG (test code = 1605) Negative                              

 

 

             On board controls acceptable with C Yes                            

        



             Line (test code = 3574)                                        

 

             POCT PREG LOT # (test code = 3575)                                 

       

 

             POCT PREG TEST  DATE (test                                   

     



             code = 3576)                                        

 

             Lab Interpretation (test code = Normal                             

    



             33323-9)                                            



[facility] PREGNANCY [id number] 15:48:00





             Test Item    Value        Reference Range Interpretation Comments

 

             POCT PREG (test code = 1605) Negative                              

 

 

             On board controls acceptable with C Yes                            

        



             Line (test code = 3574)                                        

 

             POCT PREG LOT # (test code = 3575)                                 

       

 

             POCT PREG TEST  DATE (test                                   

     



             code = 3576)                                        

 

             Lab Interpretation (test code = Normal                             

    



             85409-7)                                            



[facility] PREGNANCY [id number] 15:48:00





             Test Item    Value        Reference Range Interpretation Comments

 

             POCT PREG (test code = 1605) Negative                              

 

 

             On board controls acceptable with C Yes                            

        



             Line (test code = 3574)                                        

 

             POCT PREG LOT # (test code = 3575)                                 

       

 

             POCT PREG TEST  DATE (test                                   

     



             code = 3576)                                        

 

             Lab Interpretation (test code = Normal                             

    



             63074-0)                                            



[facility] PREGNANCY TEST2023-08-15 17:04:00





             Test Item    Value        Reference Range Interpretation Comments

 

             POCT PREG (test code = 1605) Negative                              

 

 

             On board controls acceptable with C Yes                            

        



             Line (test code = 3574)                                        

 

             POCT PREG LOT # (test code = 3575)                                 

       

 

             POCT PREG TEST  DATE (test                                   

     



             code = 3576)                                        

 

             Lab Interpretation (test code = Normal                             

    



             78454-7)                                            



[facility] PREGNANCY TEST2023-08-15 17:04:00





             Test Item    Value        Reference Range Interpretation Comments

 

             POCT PREG (test code = 1605) Negative                              

 

 

             On board controls acceptable with C Yes                            

        



             Line (test code = 3574)                                        

 

             POCT PREG LOT # (test code = 3575)                                 

       

 

             POCT PREG TEST  DATE (test                                   

     



             code = 3576)                                        

 

             Lab Interpretation (test code = Normal                             

    



             53867-6)                                            



[facility] PREGNANCY TEST2023-08-15 17:04:00





             Test Item    Value        Reference Range Interpretation Comments

 

             POCT PREG (test code = 1605) Negative                              

 

 

             On board controls acceptable with C Yes                            

        



             Line (test code = 3574)                                        

 

             POCT PREG LOT # (test code = 3575)                                 

       

 

             POCT PREG TEST  DATE (test                                   

     



             code = 3576)                                        

 

             Lab Interpretation (test code = Normal                             

    



             55497-0)                                            



[facility] PREGNANCY TEST2023-08-15 17:04:00





             Test Item    Value        Reference Range Interpretation Comments

 

             POCT PREG (test code = 1605) Negative                              

 

 

             On board controls acceptable with C Yes                            

        



             Line (test code = 3574)                                        

 

             POCT PREG LOT # (test code = 3575)                                 

       

 

             POCT PREG TEST  DATE (test                                   

     



             code = 3576)                                        

 

             Lab Interpretation (test code = Normal                             

    



             93177-9)                                            



[facility] PREGNANCY [id number] 04:00:00





             Test Item    Value        Reference Range Interpretation Comments

 

             POCT PREG (test code = 1605) Negative                              

 

 

             On board controls acceptable with Yes                              

      



             C Line (test code = 3574)                                        

 

             POCT PREG LOT # (test code = 3575) [id number]                          

       

 

             POCT PREG TEST  DATE (test 2024                        

     



             code = 3576)                                        

 

             Lab Interpretation (test code = Normal                             

    



             57483-2)                                            



[facility] 
17:44:17





             Test Item    Value        Reference Range Interpretation Comments

 

             Syphilis IgG/IgM (test Non-reactive Non-reactive              



             code = 19876-5)                                        

 

             JULITO (test code = JULITO)  Non-reactive - No                           



                          serologic evidence                           



                          of T. pallidum                           



                          infection. Cannot                           



                          exclude incubating                           



                          or early syphilis.                           



                          Submit a second                           



                          specimen in 2-4                           



                          weeks if syphilis is                           



                          clinically                             



                          suspected. Equivocal                           



                          - Further testing to                           



                          follow. Reactive -                           



                          Further testing to                           



                          follow.                                

 

             Lab Interpretation (test Normal                                 



             code = 24079-0)                                        



[facility]10 
17:44:17





             Test Item    Value        Reference Range Interpretation Comments

 

             Syphilis IgG/IgM (test Non-reactive Non-reactive              



             code = 92405-9)                                        

 

             JULITO (test code = JULITO)  Non-reactive - No                           



                          serologic evidence                           



                          of T. pallidum                           



                          infection. Cannot                           



                          exclude incubating                           



                          or early syphilis.                           



                          Submit a second                           



                          specimen in 2-4                           



                          weeks if syphilis is                           



                          clinically                             



                          suspected. Equivocal                           



                          - Further testing to                           



                          follow. Reactive -                           



                          Further testing to                           



                          follow.                                

 

             Lab Interpretation (test Normal                                 



             code = 76281-4)                                        



[facility]





             Test Item    Value        Reference Range Interpretation Comments

 

             WBC (test code = 10.88        See_Comment                [Automated



             8490-2)                                             message] The sy

stem



                                                                 which generated



                                                                 this result



                                                                 transmitted



                                                                 reference range

:



                                                                 4.30 - 11.10



                                                                 10*3/?L. The



                                                                 reference range

 was



                                                                 not used to



                                                                 interpret this



                                                                 result as



                                                                 normal/abnormal

.

 

             RBC (test code = 3.42         See_Comment  L             [Automated



             179-8)                                              message] The sy

stem



                                                                 which generated



                                                                 this result



                                                                 transmitted



                                                                 reference range

:



                                                                 3.93 - 5.25



                                                                 10*6/?L. The



                                                                 reference range

 was



                                                                 not used to



                                                                 interpret this



                                                                 result as



                                                                 normal/abnormal

.

 

             HGB (test code = 9.7 g/dL     11.6-15.0    L            



             718-7)                                              

 

             HCT (test code = 29.1 %       35.7-45.2    L            



             4544-3)                                             

 

             MCV (test code = 85.1 fL      80.6-95.5                 



             787-2)                                              

 

             MCH (test code = 28.4 pg      25.9-32.8                 



             785-6)                                              

 

             MCHC (test code = 33.3 g/dL    31.6-35.1                 



             786-4)                                              

 

             RDW-SD (test code = 37.2 fL      39.0-49.9    L            



             13975-5)                                            

 

             RDW-CV (test code = 12.0 %       12.0-15.5                 



             788-0)                                              

 

             PLT (test code = 187          See_Comment                [Automated



             777-3)                                              message] The sy

stem



                                                                 which generated



                                                                 this result



                                                                 transmitted



                                                                 reference range

:



                                                                 166 - 358 10*3/

?L.



                                                                 The reference r

barber



                                                                 was not used to



                                                                 interpret this



                                                                 result as



                                                                 normal/abnormal

.

 

             MPV (test code = 11.5 fL      9.5-12.9                  



             37868-3)                                            

 

             NRBC/100 WBC (test 0.0          See_Comment                [Automat

ed



             code = 2558204250)                                        message] 

The system



                                                                 which generated



                                                                 this result



                                                                 transmitted



                                                                 reference range

:



                                                                 0.0 - 10.0 /100



                                                                 WBCs. The refer

ence



                                                                 range was not u

sed



                                                                 to interpret th

is



                                                                 result as



                                                                 normal/abnormal

.

 

             NRBC x10^3 (test code              See_Comment                [Auto

mated



             = [id number])                                        message] The s

ystem



                                                                 which generated



                                                                 this result



                                                                 transmitted



                                                                 reference range

:



                                                                 10*3/?L. The



                                                                 reference range

 was



                                                                 not used to



                                                                 interpret this



                                                                 result as



                                                                 normal/abnormal

.

 

             GRAN MAT (NEUT) % 70.4 %                                 



             (test code = 770-8)                                        

 

             IMM GRAN % (test code 0.40 %                                 



             = 8759000237)                                        

 

             LYMPH % (test code = 23.0 %                                 



             736-9)                                              

 

             MONO % (test code = 5.4 %                                  



             5905-5)                                             

 

             EOS % (test code = 0.5 %                                  



             713-8)                                              

 

             BASO % (test code = 0.3 %                                  



             706-2)                                              

 

             GRAN MAT x10^3(ANC) 7.67 10*3/uL 1.88-7.09    H            



             (test code =                                        



             5902145169)                                         

 

             IMM GRAN x10^3 (test 0.04 10*3/uL 0.00-0.06                 



             code = 0206974320)                                        

 

             LYMPH x10^3 (test code 2.50 10*3/uL 1.32-3.29                 



             = 731-0)                                            

 

             MONO x10^3 (test code 0.59 10*3/uL 0.33-0.92                 



             = 742-7)                                            

 

             EOS x10^3 (test code = 0.05 10*3/uL 0.03-0.39                 



             711-2)                                              

 

             BASO x10^3 (test code 0.03 10*3/uL 0.01-0.07                 



             = 704-7)                                            

 

             Lab Interpretation Abnormal                               



             (test code = 08456-2)                                        



[facility] with Differential2023-03-10 10:25:52





             Test Item    Value        Reference Range Interpretation Comments

 

             WBC (test code = 10.88        See_Comment                [Automated



             6690-2)                                             message] The sy

stem



                                                                 which generated



                                                                 this result



                                                                 transmitted



                                                                 reference range

:



                                                                 4.30 - 11.10



                                                                 10*3/?L. The



                                                                 reference range

 was



                                                                 not used to



                                                                 interpret this



                                                                 result as



                                                                 normal/abnormal

.

 

             RBC (test code = 3.42         See_Comment  L             [Automated



             789-8)                                              message] The sy

stem



                                                                 which generated



                                                                 this result



                                                                 transmitted



                                                                 reference range

:



                                                                 3.93 - 5.25



                                                                 10*6/?L. The



                                                                 reference range

 was



                                                                 not used to



                                                                 interpret this



                                                                 result as



                                                                 normal/abnormal

.

 

             HGB (test code = 9.7 g/dL     11.6-15.0    L            



             718-7)                                              

 

             HCT (test code = 29.1 %       35.7-45.2    L            



             4544-3)                                             

 

             MCV (test code = 85.1 fL      80.6-95.5                 



             787-2)                                              

 

             MCH (test code = 28.4 pg      25.9-32.8                 



             785-6)                                              

 

             MCHC (test code = 33.3 g/dL    31.6-35.1                 



             786-4)                                              

 

             RDW-SD (test code = 37.2 fL      39.0-49.9    L            



             85202-8)                                            

 

             RDW-CV (test code = 12.0 %       12.0-15.5                 



             788-0)                                              

 

             PLT (test code = 187          See_Comment                [Automated



             777-3)                                              message] The sy

stem



                                                                 which generated



                                                                 this result



                                                                 transmitted



                                                                 reference range

:



                                                                 166 - 358 10*3/

?L.



                                                                 The reference r

barber



                                                                 was not used to



                                                                 interpret this



                                                                 result as



                                                                 normal/abnormal

.

 

             MPV (test code = 11.5 fL      9.5-12.9                  



             17604-7)                                            

 

             NRBC/100 WBC (test 0.0          See_Comment                [Automat

ed



             code = 3619894838)                                        message] 

The system



                                                                 which generated



                                                                 this result



                                                                 transmitted



                                                                 reference range

:



                                                                 0.0 - 10.0 /100



                                                                 WBCs. The refer

ence



                                                                 range was not u

sed



                                                                 to interpret th

is



                                                                 result as



                                                                 normal/abnormal

.

 

             NRBC x10^3 (test code              See_Comment                [Auto

mated



             = [id number])                                        message] The s

ystem



                                                                 which generated



                                                                 this result



                                                                 transmitted



                                                                 reference range

:



                                                                 10*3/?L. The



                                                                 reference range

 was



                                                                 not used to



                                                                 interpret this



                                                                 result as



                                                                 normal/abnormal

.

 

             GRAN MAT (NEUT) % 70.4 %                                 



             (test code = 770-8)                                        

 

             IMM GRAN % (test code 0.40 %                                 



             = 2630981736)                                        

 

             LYMPH % (test code = 23.0 %                                 



             736-9)                                              

 

             MONO % (test code = 5.4 %                                  



             5905-5)                                             

 

             EOS % (test code = 0.5 %                                  



             713-8)                                              

 

             BASO % (test code = 0.3 %                                  



             706-2)                                              

 

             GRAN MAT x10^3(ANC) 7.67 10*3/uL 1.88-7.09    H            



             (test code =                                        



             6615325382)                                         

 

             IMM GRAN x10^3 (test 0.04 10*3/uL 0.00-0.06                 



             code = 6827325986)                                        

 

             LYMPH x10^3 (test code 2.50 10*3/uL 1.32-3.29                 



             = 731-0)                                            

 

             MONO x10^3 (test code 0.59 10*3/uL 0.33-0.92                 



             = 742-7)                                            

 

             EOS x10^3 (test code = 0.05 10*3/uL 0.03-0.39                 



             711-2)                                              

 

             BASO x10^3 (test code 0.03 10*3/uL 0.01-0.07                 



             = 704-7)                                            

 

             Lab Interpretation Abnormal                               



             (test code = 27809-8)                                        



[facility] (D) IMMUNE GLOBULIN2023-03-10 03:14:48





             Test Item    Value        Reference Range Interpretation Comments

 

             RHIG CANDIDATE? No- see comment                           Patient i

s not a



             (test code =                                        candidate for R

hIg-



             5188)                                               Patient is Rh



                                                                 Positive.Perfor

med at



                                                                 [facility]



                                                                 Services [facility] 

Blood



                                                                 [id number] [place] [address] Free:



                                                                 [phone]

A No.



                                                                 [id number]



[facility] (D) IMMUNE GLOBULIN2023-03-10 03:14:48





             Test Item    Value        Reference Range Interpretation Comments

 

             RHIG CANDIDATE? No- see comment                           Patient i

s not a



             (test code =                                        candidate for R

hIg-



             5188)                                               Patient is Rh



                                                                 Positive.Perfor

med at



                                                                 [facility]



                                                                 Services - [facility] 

Blood



                                                                 [id number] [place], [place] [address]Toll Free:



                                                                 [phone]

A No.



                                                                 [id number]



[facility]





             Test Item    Value        Reference Range Interpretation Comments

 

             VENOUS BASE EXCESS, -1.4         mEq/L                     



             CORD (test code =                                        



             0985842569)                                         

 

             VENOUS PH, CORD (test 7.37         7.25-7.45                 



             code = 1776248560)                                        

 

             VENOUS PC02, CORD 43           See_Comment                [Automate

d message] The



             (test code =                                        system which ge

nerated



             8302609782)                                         this result tra

nsmitted



                                                                 reference range

: 27 - 49



                                                                 mmHg. The refer

ence range



                                                                 was not used to

 interpret



                                                                 this result as



                                                                 normal/abnormal

.

 

             VENOUS PO2, CORD (test 37           See_Comment                [Aut

omated message] The



             code = 0960934507)                                        system [facility] generated



                                                                 this result tra

nsmitted



                                                                 reference range

: 17 - 41



                                                                 mmHg. The refer

ence range



                                                                 was not used to

 interpret



                                                                 this result as



                                                                 normal/abnormal

.

 

             VENOUS BICARBONATE, 24           See_Comment                [Automa

nick message] The



             CORD (test code =                                        system [facility] generated



             4923685323)                                         this result tra

nsmitted



                                                                 reference range

: 12 - 29



                                                                 mEq/L. The refe

rence range



                                                                 was not used to

 interpret



                                                                 this result as



                                                                 normal/abnormal

.



[facility]





             Test Item    Value        Reference Range Interpretation Comments

 

             VENOUS BASE EXCESS, -1.4         mEq/L                     



             CORD (test code =                                        



             9178929129)                                         

 

             VENOUS PH, CORD (test 7.37         7.25-7.45                 



             code = 6158220250)                                        

 

             VENOUS PC02, CORD 43           See_Comment                [Automate

d message] The



             (test code =                                        system which ge

nerated



             3814128857)                                         this result tra

nsmitted



                                                                 reference range

: 27 - 49



                                                                 mmHg. The refer

ence range



                                                                 was not used to

 interpret



                                                                 this result as



                                                                 normal/abnormal

.

 

             VENOUS PO2, CORD (test 37           See_Comment                [Aut

omated message] The



             code = 3000303787)                                        system [facility] generated



                                                                 this result tra

nsmitted



                                                                 reference range

: 17 - 41



                                                                 mmHg. The refer

ence range



                                                                 was not used to

 interpret



                                                                 this result as



                                                                 normal/abnormal

.

 

             VENOUS BICARBONATE, 24           See_Comment                [Automa

nick message] The



             CORD (test code =                                        system [facility] generated



             [id number])                                         this result tra

nsmitted



                                                                 reference range

: 12 - 29



                                                                 mEq/L. The refe

rence range



                                                                 was not used to

 interpret



                                                                 this result as



                                                                 normal/abnormal

.



[id number]





             Test Item    Value        Reference Range Interpretation Comments

 

             BASE EXCESS, CORD -4.6         mEq/L                     



             (test code =                                        



             [id number])                                         

 

             AC PH, CORD (BEAKER) 7.24         7.18-7.38                 



             (test code =                                        



             [id number])                                         

 

             PC02, CORD (test code 57           See_Comment                [Auto

mated message] The



             = [id number])                                        system which g

enerated this



                                                                 result transmit

nick



                                                                 reference range

: 32 - 66



                                                                 mmHg. The refer

ence range



                                                                 was not used to

 interpret



                                                                 this result as



                                                                 normal/abnormal

.

 

             PO2, CORD (test code 13           See_Comment                [Autom

ated message] The



             = )                                        system which g

enerated this



                                                                 result transmit

nick



                                                                 reference range

: 10 - 30



                                                                 mmHg. The refer

ence range



                                                                 was not used to

 interpret



                                                                 this result as



                                                                 normal/abnormal

.

 

             BICARBONATE, CORD 24           See_Comment                [Automate

d message] The



             (test code =                                        system which ge

nerated this



             [id number])                                         result transmit

nick



                                                                 reference range

: 17 - 27



                                                                 mEq/L. The refe

rence range



                                                                 was not used to

 interpret



                                                                 this result as



                                                                 normal/abnormal

.



[facility] CORD GAS2023-03-10 02:30:36





             Test Item    Value        Reference Range Interpretation Comments

 

             BASE EXCESS, CORD -4.6         mEq/L                     



             (test code =                                        



             [id number])                                         

 

             AC PH, CORD (BEAKER) 7.24         7.18-7.38                 



             (test code =                                        



             1565529744)                                         

 

             PC02, CORD (test code 57           See_Comment                [Auto

mated message] The



             = [id number])                                        system which g

enerated this



                                                                 result transmit

nick



                                                                 reference range

: 32 - 66



                                                                 mmHg. The refer

ence range



                                                                 was not used to

 interpret



                                                                 this result as



                                                                 normal/abnormal

.

 

             PO2, CORD (test code 13           See_Comment                [Autom

ated message] The



             = )                                        system which g

enerated this



                                                                 result transmit

nick



                                                                 reference range

: 10 - 30



                                                                 mmHg. The refer

ence range



                                                                 was not used to

 interpret



                                                                 this result as



                                                                 normal/abnormal

.

 

             BICARBONATE, CORD 24           See_Comment                [Automate

d message] The



             (test code =                                        system which ge

nerated this



             [id number])                                         result transmit

nick



                                                                 reference range

: 17 - 27



                                                                 mEq/L. The refe

rence range



                                                                 was not used to

 interpret



                                                                 this result as



                                                                 normal/abnormal

.



[facility] URINALYSIS W SPECIFIC [id number] 
19:14:00





             Test Item    Value        Reference Range Interpretation Comments

 

             POCT U SP GRAV (test code = 1.020 mg/dl  1.005-1.025               



             3255)                                               

 

             POCT PH U (test code = 3254) 7 mg/dl      5-8                      

 

 

             POCT U LEUK EST (test code = 1+           Negative - Negative      

        



             3263)                                               

 

             POCT U NIT (test code = 3262) neg          Negative - Negative     

         

 

             POCT U PROT (test code = trace        Negative - Negative          

    



             3259)                                               

 

             POCT U GLU (test code = 3256) neg          Negative - Negative     

         

 

             POCT U KETONE (test code = 2+           Negative - Negative        

      



             3258)                                               

 

             POCT U UROBILI (test code = 4 mg/dl      0.2-1        A            



             3260)                                               

 

             POCT U BILI (test code = neg          Negative - Negative          

    



             3261)                                               

 

             POCT U BLD (test code = 3257) neg          Negative - Negative     

         

 

             POCT U COLOR (test code = yellow                                 



             3266)                                               

 

             POCT U APPEAR (test code = clear                                  



             3267)                                               

 

             Lab Interpretation (test code Abnormal                             

  



             = 72282-9)                                          



[facility] URINALYSIS W SPECIFIC [id number] 
19:54:00





             Test Item    Value        Reference Range Interpretation Comments

 

             POCT U SP GRAV (test code = 1.020 mg/dl  1.005-1.025               



             3255)                                               

 

             POCT PH U (test code = 3254) 5 mg/dl      5-8                      

 

 

             POCT U LEUK EST (test code = neg          Negative - Negative      

        



             3263)                                               

 

             POCT U NIT (test code = 3262) neg          Negative - Negative     

         

 

             POCT U PROT (test code = trace        Negative - Negative          

    



             3259)                                               

 

             POCT U GLU (test code = 3256) neg          Negative - Negative     

         

 

             POCT U KETONE (test code = neg          Negative - Negative        

      



             3258)                                               

 

             POCT U UROBILI (test code = neg          0.2-1                     



             3260)                                               

 

             POCT U BILI (test code = neg          Negative - Negative          

    



             3261)                                               

 

             POCT U BLD (test code = 3257) neg          Negative - Negative     

         

 

             POCT U COLOR (test code =                                        



             3266)                                               

 

             POCT U APPEAR (test code =                                        



             3267)                                               



[facility] URINALYSIS W SPECIFIC [id number] 
19:58:00





             Test Item    Value        Reference Range Interpretation Comments

 

             POCT U SP GRAV (test code = 3255) n            1.005-1.025         

      

 

             POCT PH U (test code = 3254) n            5-8                      

 

 

             POCT U LEUK EST (test code = 3263) n            Negative - Negative

              

 

             POCT U NIT (test code = 3262) n            Negative - Negative     

         

 

             POCT U PROT (test code = 3259) n            Negative - Negative    

          

 

             POCT U GLU (test code = 3256) n            Negative - Negative     

         

 

             POCT U KETONE (test code = 3258) n            Negative - Negative  

            

 

             POCT U UROBILI (test code = 3260) n            0.2-1               

      

 

             POCT U BILI (test code = 3261) n            Negative - Negative    

          

 

             POCT U BLD (test code = 3257) n            Negative - Negative     

         

 

             POCT U COLOR (test code = 3266)                                    

    

 

             POCT U APPEAR (test code = 3267)                                   

     



[facility] URINALYSIS W SPECIFIC [id number] 
19:17:00





             Test Item    Value        Reference Range Interpretation Comments

 

             POCT U SP GRAV (test code = 1.025 mg/dl  1.005-1.025               



             3255)                                               

 

             POCT PH U (test code = 3254) 6 mg/dl      5-8                      

 

 

             POCT U LEUK EST (test code = 1+           Negative - Negative A    

        



             3263)                                               

 

             POCT U NIT (test code = 3262) neg          Negative - Negative     

         

 

             POCT U PROT (test code = trace        Negative - Negative A        

    



             3259)                                               

 

             POCT U GLU (test code = 3256) neg          Negative - Negative     

         

 

             POCT U KETONE (test code = 3+           Negative - Negative A      

      



             3258)                                               

 

             POCT U UROBILI (test code = neg          0.2-1                     



             3260)                                               

 

             POCT U BILI (test code = neg          Negative - Negative          

    



             3261)                                               

 

             POCT U BLD (test code = 3257) neg          Negative - Negative     

         

 

             POCT U COLOR (test code =                                        



             3266)                                               

 

             POCT U APPEAR (test code =                                        



             3267)                                               

 

             Lab Interpretation (test code Abnormal                             

  



             = 45295-3)                                          



[facility] URINALYSIS W SPECIFIC [id number] 
17:27:00





             Test Item    Value        Reference Range Interpretation Comments

 

             POCT U SP GRAV (test code = 1.020 mg/dl  1.005-1.025               



             3255)                                               

 

             POCT PH U (test code = 3254) 5 mg/dl      5-8                      

 

 

             POCT U LEUK EST (test code = neg          Negative - Negative      

        



             3263)                                               

 

             POCT U NIT (test code = 3262) neg          Negative - Negative     

         

 

             POCT U PROT (test code = neg          Negative - Negative          

    



             3259)                                               

 

             POCT U GLU (test code = 3256) 1+           Negative - Negative     

         

 

             POCT U KETONE (test code = neg          Negative - Negative        

      



             3258)                                               

 

             POCT U UROBILI (test code = neg          0.2-1                     



             3260)                                               

 

             POCT U BILI (test code = neg          Negative - Negative          

    



             3261)                                               

 

             POCT U BLD (test code = 3257) neg          Negative - Negative     

         

 

             POCT U COLOR (test code =                                        



             3266)                                               

 

             POCT U APPEAR (test code =                                        



             3267)                                               



[facility] URINALYSIS W SPECIFIC [id number] 
17:25:00





             Test Item    Value        Reference Range Interpretation Comments

 

             POCT U SP GRAV (test code = 1.015 mg/dl  1.005-1.025               



             3255)                                               

 

             POCT PH U (test code = 3254) 7 mg/dl      5-8                      

 

 

             POCT U LEUK EST (test code = NEG          Negative - Negative      

        



             3263)                                               

 

             POCT U NIT (test code = 3262) NEG          Negative - Negative     

         

 

             POCT U PROT (test code = NEG          Negative - Negative          

    



             3259)                                               

 

             POCT U GLU (test code = 3256) NEG          Negative - Negative     

         

 

             POCT U KETONE (test code = NEG          Negative - Negative        

      



             3258)                                               

 

             POCT U UROBILI (test code = 8 mg/dl      0.2-1        A            



             3260)                                               

 

             POCT U BILI (test code = NEG          Negative - Negative          

    



             3261)                                               

 

             POCT U BLD (test code = 3257) TRACE        Negative - Negative A   

         

 

             POCT U COLOR (test code =                                        



             3266)                                               

 

             POCT U APPEAR (test code =                                        



             3267)                                               

 

             Lab Interpretation (test code Abnormal                             

  



             = 60343-8)                                          



[facility] URINALYSIS W SPECIFIC [id number] 
19:20:00





             Test Item    Value        Reference Range Interpretation Comments

 

             POCT U SP GRAV (test code = 1.015 mg/dl  1.005-1.025               



             3255)                                               

 

             POCT PH U (test code = 3254) 6 mg/dl      5-8                      

 

 

             POCT U LEUK EST (test code = 2+           Negative - Negative A    

        



             3263)                                               

 

             POCT U NIT (test code = 3262) NEG          Negative - Negative     

         

 

             POCT U PROT (test code = TRACE        Negative - Negative A        

    



             3259)                                               

 

             POCT U GLU (test code = 3256) NEG          Negative - Negative     

         

 

             POCT U KETONE (test code = SMALL        Negative - Negative A      

      



             3258)                                               

 

             POCT U UROBILI (test code = NEG          0.2-1                     



             3260)                                               

 

             POCT U BILI (test code = NEG          Negative - Negative          

    



             3261)                                               

 

             POCT U BLD (test code = 3257) NEG          Negative - Negative     

         

 

             POCT U COLOR (test code =                                        



             3266)                                               

 

             POCT U APPEAR (test code =                                        



             3267)                                               

 

             Lab Interpretation (test code Abnormal                             

  



             = 89220-5)                                          



[facility] URINALYSIS W SPECIFIC [id number] 
14:14:00





             Test Item    Value        Reference Range Interpretation Comments

 

             POCT U SP GRAV (test code = 1.010 mg/dl  1.005-1.025               



             3255)                                               

 

             POCT PH U (test code = 3254) 6 mg/dl      5-8                      

 

 

             POCT U LEUK EST (test code = NEG          Negative - Negative      

        



             3263)                                               

 

             POCT U NIT (test code = 3262) NEG          Negative - Negative     

         

 

             POCT U PROT (test code = NEG          Negative - Negative          

    



             3259)                                               

 

             POCT U GLU (test code = 3256) NEG          Negative - Negative     

         

 

             POCT U KETONE (test code = NEG          Negative - Negative        

      



             3258)                                               

 

             POCT U UROBILI (test code = NEG          0.2-1                     



             3260)                                               

 

             POCT U BILI (test code = NEG          Negative - Negative          

    



             3261)                                               

 

             POCT U BLD (test code = 3257) NEG          Negative - Negative     

         

 

             POCT U COLOR (test code =                                        



             3266)                                               

 

             POCT U APPEAR (test code =                                        



             3267)                                               



[facility] URINALYSIS W SPECIFIC [id number] 
16:18:00





             Test Item    Value        Reference Range Interpretation Comments

 

             POCT U SP GRAV (test code = 1.010 mg/dl  1.005-1.025               



             3255)                                               

 

             POCT PH U (test code = 3254) 6 mg/dl      5-8                      

 

 

             POCT U LEUK EST (test code = neg          Negative - Negative      

        



             3263)                                               

 

             POCT U NIT (test code = 3262) neg          Negative - Negative     

         

 

             POCT U PROT (test code = neg          Negative - Negative          

    



             3259)                                               

 

             POCT U GLU (test code = 3256) neg          Negative - Negative     

         

 

             POCT U KETONE (test code = neg          Negative - Negative        

      



             3258)                                               

 

             POCT U UROBILI (test code = neg          0.2-1                     



             3260)                                               

 

             POCT U BILI (test code = neg          Negative - Negative          

    



             3261)                                               

 

             POCT U BLD (test code = 3257) neg          Negative - Negative     

         

 

             POCT U COLOR (test code =                                        



             3266)                                               

 

             POCT U APPEAR (test code =                                        



             3267)                                               

 

             Lab Interpretation (test code Normal                               

  



             = 02812-2)                                          



[facility] [id number] 15:17:11Surgical 
Pathology Report Case: [id number] Authorizing Provider: Kait Thompson MD 
Collected: 2021 08:28 AM Ordering Location: [facility] PERIOPERATIVE Received: 
2021 09:17 AM  SERVICES Pathologist: Ko Hicks MD Specimen: 
Soft Tissue, Other, Partial Gastrectomy A. STOMACH, PARTIAL 
GASTRECTOMY:CONGESTED GASTRIC MUCOSA WITHOUT SIGNIFICANT HISTOPATHOLOGIC 
ABNORMALITIES.NEGATIVE FOR INTESTINAL METAPLASIA, DYSPLASIA, OR INVASIVE 
CARCINOMA. Signing Pathologist Direct Phone Line: [phone] 
signed by Ko Hicks MD on 2021 at 3:17 [id number] and postop 
diagnosis: Morbid obesity. Body mass index: 50.0-59.9, adult. Pre-diabetes, 
shortness of breath Soft tissue, other, partial gastrectomyReceived in formalin 
labeled with the patient's name, accession number and "soft tissue, other" is a 
2 x 4 x 3 cm partial gastrectomy specimen with an overlying staple line 
measuring 19 cm in length. A portion of the specimen displays detached staple 
with open hemorrhagic parenchyma measuring 3 x 2.5 cm. The serosal surface is 
tan-pink, hyperemic. There are the normal rugal folds without discrete masses 
identified. Representative sections are submitted: Section code: A1, 
representative section of staple line margin en faceA2, opening with detached 
portion of staple line displaying mucosal hemorrhageA3, random sections 
displaying the normal rugal folds. KM/pl Performed.[facility], Department of Pathology, [address] [place] [address], Tel 
[phone], [facility] of [facility], [address] [place] [address], Tel [phone], [facility] of [facility], [address] [place] [address], Tel 
[phone] 05:52:44





             Test Item    Value        Reference Range Interpretation Comments

 

             PHOSPHORUS (BEAKER) (test code = 2.7 mg/dL    2.3-4.7              

     



             604)                                                



 ID - MELINDA MBASIC METABOLIC [id number] 05:52:43





             Test Item    Value        Reference Range Interpretation Comments

 

             SODIUM (BEAKER) 137 meq/L    136-145                   



             (test code = 381)                                        

 

             POTASSIUM (BEAKER) 3.9 meq/L    3.5-5.1                   



             (test code = 379)                                        

 

             CHLORIDE (BEAKER) 105 meq/L                        



             (test code = 382)                                        

 

             CO2 (BEAKER) (test 21 meq/L     22-29        L            



             code = 355)                                         

 

             BLOOD UREA NITROGEN 6 mg/dL      7-21         L            



             (BEAKER) (test code                                        



             = 354)                                              

 

             CREATININE (BEAKER) 0.69 mg/dL   0.57-1.25                 



             (test code = 358)                                        

 

             GLUCOSE RANDOM 129 mg/dL           H            



             (BEAKER) (test code                                        



             = 652)                                              

 

             CALCIUM (BEAKER) 8.9 mg/dL    8.4-10.2                  



             (test code = 697)                                        

 

             EGFR (BEAKER) (test 100 mL/min/1.73                           ESTIM

ATED GFR IS



             code = 1092) sq m                                   NOT AS ACCURATE

 AS



                                                                 CREATININE



                                                                 CLEARANCE IN



                                                                 PREDICTING



                                                                 GLOMERULAR



                                                                 FILTRATION RATE

.



                                                                 ESTIMATED GFR I

S



                                                                 NOT APPLICABLE 

FOR



                                                                 DIALYSIS PATIEN

TS.



 ID - MELINDA [id number] 05:52:43





             Test Item    Value        Reference Range Interpretation Comments

 

             MAGNESIUM (BEAKER) (test code = 1.8 mg/dL    1.6-2.6               

    



             627)                                                



 ID - MELINDA [facility] (HEMOGRAM ONLY)2021 04:49:25





             Test Item    Value        Reference Range Interpretation Comments

 

             WHITE BLOOD CELL COUNT (BEAKER) 10.6 K/ L    3.5-10.5     H        

    



             (test code = 775)                                        

 

             RED BLOOD CELL COUNT (BEAKER) 4.37 M/ L    3.93-5.22               

  



             (test code = 761)                                        

 

             HEMOGLOBIN (BEAKER) (test code = 11.8 GM/DL   11.2-15.7            

     



             410)                                                

 

             HEMATOCRIT (BEAKER) (test code = 37.0 %       34.1-44.9            

     



             411)                                                

 

             MEAN CORPUSCULAR VOLUME (BEAKER) 84.7 fL      79.4-94.8            

     



             (test code = 753)                                        

 

             MEAN CORPUSCULAR HEMOGLOBIN 27.0 pg      25.6-32.2                 



             (BEAKER) (test code = 751)                                        

 

             MEAN CORPUSCULAR HEMOGLOBIN CONC 31.9 GM/DL   32.2-35.5    L       

     



             (BEAKER) (test code = 752)                                        

 

             RED CELL DISTRIBUTION WIDTH 13.5 %       11.7-14.4                 



             (BEAKER) (test code = 412)                                        

 

             PLATELET COUNT (BEAKER) (test 312 K/CU MM  150-450                 

  



             code = 756)                                         

 

             MEAN PLATELET VOLUME (BEAKER) 11.0 fL      9.4-12.3                

  



             (test code = 754)                                        

 

             NUCLEATED RED BLOOD CELLS 0 /100 WBC   0-0                       



             (BEAKER) (test code = 413)                                        



POCT-GLUCOSE [id number] 10:27:47





             Test Item    Value        Reference Range Interpretation Comments

 

             POC-GLUCOSE METER 150 mg/dL           H            : TESTED A

T [facility] [address]



             (BEAKER) (test code =                                        [facility],



             1538)                                               [id number]:



                                                                 /Techni

lopez ID



                                                                 = [id number] for Mita Amor



POCT-GLUCOSE [id number] 06:31:44





             Test Item    Value        Reference Range Interpretation Comments

 

             POC-GLUCOSE METER 211 mg/dL           H            : TESTED A

T [facility] [address]



             (BEAKER) (test code =                                        [facility],



             1538)                                               [id number]:



                                                                 /Techni

lopez ID



                                                                 = [id number] for JOSE G CLAY (MASOOD)CORRY



SARS-COV2/RT-PCR ([facility])2021-10-29 03:47:46





             Test Item    Value        Reference Range Interpretation Comments

 

             SARS-COV2/RT-PCR (test code = Negative     Negative                

  



             [id number])                                            



Negative result for this test determines that SARS-CoV-2 RNA was not present in 
the specimen above the Limit of Detection (LOD). However, Negative results do 
not preclude SARS-CoV-2 infection and should not be used as the sole basis for 
treatment or patient management decisions. Negative results must be combined 
with clinical observations, patient history, and epidemiological information. A 
false negative result may occur if a specimen is improperly collected, 
transported, or handled. A false negative result should be considered if 
patient's recent exposures or clinical presentation indicate that COVID-19 
(SARS-CoV-2) is likely and diagnostic tests for other causes of illness are 
negative. Re-testing should be considered in cases of suspected false 
negatives.The limit of detection for this assay is 100 copies/mL.This SARS-CoV-2
test is a real-time RT_PCR test intended for the qualitative detection of 
nucleic acid from SARS-CoV-2 in a nasopharyngeal swab specimen collected from 
individuals suspected of COVID-19 by their healthcare provider.This test has not
been Food and Drug Administration (FDA) cleared or approved. This is a modified 
version of an approved Emergency Use Authorization (EUA) and is in the process 
of review by the FDA. Once authorized by the FDA, the issued EUA will be 
effective until the declaration that circumstances exist justifying the 
authorization of the emergency use of in vitro diagnostic tests for detection 
and/or diagnosis of COVID-19 is terminated under Section 564(b)(2) of the Act or
the EUA is revoked under Section 564(g) of the Act.Testing was performed using kenneth [employer]ings[employer] SARS-CoV-2 assay.Fact Sheet for Healthcare 
Providers:https://www.molecular.abbott/sal/RT SARS-CoV-2 HCP Fact Sheet 51-
149516.pdfFact Sheet for Healthcare Patients:https://www.[facility].abbott/sal/RT
SARS-CoV-2 Patient Fact Sheet EN 51-769442J3.pdfBUN AND CREATININE W/RATIO
2021-10-28 13:10:53





             Test Item    Value        Reference Range Interpretation Comments

 

             BLOOD UREA NITROGEN 17 mg/dL     7-21                      



             (BEAKER) (test code                                        



             = 354)                                              

 

             CREATININE (BEAKER) 0.73 mg/dL   0.57-1.25                 



             (test code = 358)                                        

 

             BUN/CREAT RATIO 23                                     For a normal



             (BEAKER) (test code                                        individu

al on a



             = [id number])                                        normal diet, t

he



                                                                 reference inter

yareli



                                                                 for the mass ra

mauricio



                                                                 ranges between 

12:1



                                                                 and 20:1 (BUN i

n



                                                                 mg/dL/creatinin

e in



                                                                 mg/dL)

 

             EGFR (BEAKER) (test 94 mL/min/1.73                           ESTIMA

NICK GFR IS



             code = 1092) sq m                                   NOT AS ACCURATE

 AS



                                                                 CREATININE



                                                                 CLEARANCE IN



                                                                 PREDICTING



                                                                 GLOMERULAR



                                                                 FILTRATION RATE

.



                                                                 ESTIMATED GFR I

S



                                                                 NOT APPLICABLE 

FOR



                                                                 DIALYSIS PATIEN

TS.



 ID - MELINDAPATI STANLEYYTES2021-10-28 13:10:52





             Test Item    Value        Reference Range Interpretation Comments

 

             SODIUM (BEAKER) (test code = 381) 140 meq/L    136-145             

      

 

             POTASSIUM (BEAKER) (test code = 4.3 meq/L    3.5-5.1               

    



             379)                                                

 

             CHLORIDE (BEAKER) (test code = 382) 108 meq/L           H    

        

 

             CO2 (BEAKER) (test code = 355) 21 meq/L     22-29        L         

   



 ID - MELINDA MHEMOGLOBIN2021-10-28 12:50:09





             Test Item    Value        Reference Range Interpretation Comments

 

             HEMOGLOBIN (BEAKER) (test code = 13.2 GM/DL   11.2-15.7            

     



             410)                                                



 ID - [id number]

## 2023-10-17 NOTE — ER
Nurse's Notes                                                                                     

 [facility]                                                                 

Name: Alexa Albarado                                                                                 

Age: 33 yrs                                                                                       

Sex: Female                                                                                       

: 1990                                                                                   

MRN: [id number]                                                                                   

Arrival Date: 10/17/2023                                                                          

Time: 19:52                                                                                       

Account#: [id number]                                                                            

Bed 8                                                                                             

Private MD:                                                                                       

Diagnosis: Upper abdominal pain, unspecified-left                                                 

                                                                                                  

Presentation:                                                                                     

10/17                                                                                             

20:38 Chief complaint: Patient states: upper abdominal pain after eating and drinking x3 days as6 

      but worsening today. Coronavirus screen: At this time, the client does not indicate any     

      symptoms associated with coronavirus-19. Ebola Screen: No symptoms or risks identified      

      at this time. Initial Sepsis Screen: Does the patient meet any 2 criteria? No.              

      Patient's initial sepsis screen is negative. Does the patient have a suspected source       

      of infection? No. Patient's initial sepsis screen is negative. Risk Assessment: Do you      

      want to hurt yourself or someone else? Patient reports no desire to harm self or            

      others. Onset of symptoms was 2023.                                             

20:38 Acuity: PRANEETH 3                                                                           as6 

20:38 Method Of Arrival: Ambulatory                                                           as6 

                                                                                                  

Triage Assessment:                                                                                

20:37 General: Appears in no apparent distress. Behavior is calm, cooperative. Pain:          as6 

      Complains of pain in left upper quadrant Aggravated by eating, drinking. GI: Reports        

      upper abdominal pain.                                                                       

                                                                                                  

OB/GYN:                                                                                           

20:36 LMP 10/8/2023, Pregnancy unknown                                                        as6 

                                                                                                  

Historical:                                                                                       

- Allergies:                                                                                      

20:37 Sulfa (Sulfonamide Antibiotics);                                                        as6 

- PMHx:                                                                                           

20:37 Anxiety; Bipolar disorder;                                                              as6 

- PSHx:                                                                                           

20:37  section; gastric bypass;                                                       as6 

                                                                                                  

- Immunization history:: Adult Immunizations up to date, Client reports receiving the             

  2nd dose of the Covid vaccine.                                                                  

- Social history:: Smoking status: Patient denies any tobacco usage or history of.                

                                                                                                  

                                                                                                  

Screenin:39 [facility] ED Fall Risk Assessment (Adult) Score/Fall Risk Level 0 - 2 = Low Risk. Abuse  as6 

      screen: Denies threats or abuse. Denies injuries from another. Nutritional screening:       

      No deficits noted. Tuberculosis screening: No symptoms or risk factors identified.          

                                                                                                  

Assessment:                                                                                       

20:37 General: SEE TRIAGE NOTE.                                                               bp  

21:36 Reassessment: Patient appears in no apparent distress at this time. Patient is alert,   bp  

      oriented x 3, equal unlabored respirations, skin warm/dry/pink.                             

22:39 Reassessment: Patient appears in no apparent distress at this time. Patient is alert,   bp  

      oriented x 3, equal unlabored respirations, skin warm/dry/pink.                             

                                                                                                  

Vital Signs:                                                                                      

20:36  / 69; Pulse 71; Resp 18 S; Temp 98.2(O); Pulse Ox 100% on R/A; Weight 77.11 kg   as6 

      (R); Height 5 ft. 8 in. (R); Pain 3/10;                                                     

22:38  / 63; Pulse 77; Resp 16; Pulse Ox 100% ;                                         bp  

23:21  / 63; Pulse 75; Resp 16; Pulse Ox 100% ;                                         bp  

20:36 Body Mass Index 25.85 (77.11 kg, 172.72 cm)                                             as6 

20:36 Pain Scale: Adult                                                                       as6 

                                                                                                  

ED Course:                                                                                        

19:56 Patient arrived in ED.                                                                  jj6 

20:14 Vinicio Costello PA is PHCP.                                                                cp  

20:14 Eitan Acevedo DO is Attending Physician.                                                cp  

20:33 Jb Nair, BOBO is Primary Nurse.                                                    bp  

20:36 Arm band placed on.                                                                     as6 

20:39 Triage completed.                                                                       as6 

20:39 Bed in low position. Call light in reach.                                               as6 

20:52 Inserted saline lock: 20 gauge in right antecubital area, using aseptic technique.      rv  

      Blood collected.                                                                            

22:01 CT Abd/Pelvis - IV Contrast Only In Process Unspecified.                                EDMS

23:22 No provider procedures requiring assistance completed. IV discontinued, intact,         bp  

      bleeding controlled, No redness/swelling at site. Pressure dressing applied.                

                                                                                                  

Administered Medications:                                                                         

21:11 Drug: Famotidine IVP 20 mg IVP once; dilute with 10 mL 0.9% NaCl; give over 2 minutes   la4 

      Route: IVP; Rate: bolus; Infused Over: 1 mins; Site: right antecubital;                     

23:24 Follow up: Response: No adverse reaction                                                bp  

21:12 Drug: NS 0.9% IV 1000 ml IV at 1 bolus Per protocol; 1000 mL bolus Route: IV; Rate: 1   la4 

      bolus; Infused Over: 30 mins; Site: right antecubital; Delivery: Primary tubing;            

23:24 Follow up: IV Status: Completed infusion; IV Intake: 1000ml                             bp  

21:12 Drug: Ondansetron IVP 4 mg IVP once; over 2 minutes Route: IVP; Rate: bolus; Infused    la4 

      Over: 1 mins; Site: right antecubital;                                                      

23:24 Follow up: Response: No adverse reaction                                                bp  

                                                                                                  

                                                                                                  

Medication:                                                                                       

20:39 VIS not applicable for this client.                                                     as6 

                                                                                                  

Intake:                                                                                           

23:24 IV: 1000ml; Total: 1000ml.                                                              bp  

                                                                                                  

Outcome:                                                                                          

23:02 Discharge ordered by MD.                                                                cp  

23:22 Discharged to home ambulatory, with family,                                             bp  

23:22 Condition: stable                                                                           

23:22 Discharge instructions given to patient, Instructed on discharge instructions, follow       

      up and referral plans. medication usage, Demonstrated understanding of instructions,        

      follow-up care, medications, Prescriptions given X 2,                                       

23:28 Patient left the ED.                                                                    bp  

                                                                                                  

Signatures:                                                                                       

Dispatcher [employer]                           EDMS                                                 

Vinicio Costello PA PA cp Peltier, Brian, RN                      RN   bp                                                   

Nico Ordonez, RN                    RN                                                      

Paty Gutierrez Ashby RN                      RN   as6                                                  

Patsy Chapman RN                     RN   la4                                                  

                                                                                                  

**************************************************************************************************

## 2023-10-17 NOTE — RAD REPORT
EXAM DESCRIPTION:  CT - Abdomen   Pelvis W Contrast - 10/17/2023 9:59 pm

 

CLINICAL HISTORY:  left upper abdomen pain

 

COMPARISON:  No comparisons

 

TECHNIQUE:  Thin cut axial CT imaging of the abdomen and pelvis was performed following intravenous a
dministration of 100 mL Isovue 300. Multiplanar reformats were generated and reviewed.

 

All CT scans are performed using dose optimization technique as appropriate and may include automated
 exposure control or mA/KV adjustment according to patient size.

 

FINDINGS:  No suspicious findings in the lung bases.

 

The liver, spleen, adrenal glands, and pancreas show no suspicious findings. Gallbladder and biliary 
tree are also without suspicious finding.

 

Symmetric renal function is seen with no hydronephrosis or suspicious renal mass.

 

Sequelae of gastric bypass. Appendix is unremarkable. No dilated bowel loops or bowel wall thickening
. No free air, free fluid or inflammatory stranding. No hernia, mass or bulky lymphadenopathy. The ur
inary bladder is without significant finding.

 

No suspicious bony findings.

 

 

IMPRESSION:  No acute intra-abdominal process.

 

Sequelae of gastric bypass.

## 2023-10-17 NOTE — EDPHYS
Physician Documentation                                                                           

 [facility]                                                                 

Name: Alexa Albarado                                                                                 

Age: 33 yrs                                                                                       

Sex: Female                                                                                       

: 1990                                                                                   

MRN: [id number]                                                                                   

Arrival Date: 10/17/2023                                                                          

Time: 19:52                                                                                       

Account#: [id number]                                                                            

Bed 8                                                                                             

Private MD:                                                                                       

ED Physician Eitan Acevedo                                                                         

HPI:                                                                                              

10/17                                                                                             

21:05 This 33 yrs old Female presents to ER via Ambulatory with complaints of Left side       cp  

      abdominal pain/nausea after eating.                                                         

21:05 The patient presents with abdominal pain in the left upper quadrant. Onset: The         cp  

      symptoms/episode began/occurred 3 day(s) ago. The symptoms do not radiate. Associated       

      signs and symptoms: Pertinent positives: nausea, Pertinent negatives: chest pain,           

      constipation, diarrhea, dysuria, fever, shortness of breath, vomiting, cough.               

21:05 The symptoms are described as waxing/waning, worse with eating.                         cp  

                                                                                                  

OB/GYN:                                                                                           

20:36 LMP 10/8/2023, Pregnancy unknown                                                        as6 

                                                                                                  

Historical:                                                                                       

- Allergies:                                                                                      

20:37 Sulfa (Sulfonamide Antibiotics);                                                        as6 

- PMHx:                                                                                           

20:37 Anxiety; Bipolar disorder;                                                              as6 

- PSHx:                                                                                           

20:37  section; gastric bypass;                                                       as6 

                                                                                                  

- Immunization history:: Adult Immunizations up to date, Client reports receiving the             

  2nd dose of the Covid vaccine.                                                                  

- Social history:: Smoking status: Patient denies any tobacco usage or history of.                

                                                                                                  

                                                                                                  

ROS:                                                                                              

21:10 Abdomen/GI: Positive for abdominal pain, nausea, Negative for vomiting, diarrhea,       cp  

      constipation,                                                                               

21:10 Constitutional: Negative for body aches, chills, fever, poor PO intake,                 cp  

                                                                                                  

Exam:                                                                                             

21:15 Constitutional: The patient appears in no acute distress, alert, awake, non-toxic, well cp  

      developed, well nourished,                                                                  

21:15 Head/Face:  Normocephalic, atraumatic.                                                  cp  

21:15 Eyes: Periorbital structures: appear normal, Conjunctiva: normal, no exudate, no            

      injection, Sclera: no appreciated abnormality, Lids and lashes: appear normal,              

      bilaterally,                                                                                

21:15 ENT: External ear(s): are unremarkable, Nose: is normal, Mouth: Lips: moist, Oral           

      mucosa: pink and intact, moist, Posterior pharynx: is normal, airway is patent, no          

      erythema, no exudate,                                                                       

21:15 Chest/axilla: Inspection: normal,                                                           

21:15 Cardiovascular: Rate: normal, Rhythm: regular,                                              

21:15 Respiratory: the patient does not display signs of respiratory distress,  Respirations:     

      normal, no use of accessory muscles, no retractions, labored breathing, is not present,     

      Breath sounds: are clear throughout, no decreased breath sounds, no stridor, no             

      wheezing,                                                                                   

21:15 Abdomen/GI: Inspection: abdomen appears normal, Bowel sounds: active, all quadrants,        

      Palpation: soft, in all quadrants, mild abdominal tenderness, in the left upper             

      quadrant, rebound tenderness, is not appreciated, involuntary guarding, is not              

      appreciated,                                                                                

21:15 Back: ROM is normal, CVA tenderness, is absent,                                             

21:15 Skin: cellulitis, is not appreciated, no rash present.                                      

21:15 Neuro: Orientation: to person, place \T\ time. Mentation: is normal, Motor: moves all       

      fours, strength is normal, Sensation: is normal,                                            

                                                                                                  

Vital Signs:                                                                                      

20:36  / 69; Pulse 71; Resp 18 S; Temp 98.2(O); Pulse Ox 100% on R/A; Weight 77.11 kg   as6 

      (R); Height 5 ft. 8 in. (R); Pain 3/10;                                                     

22:38  / 63; Pulse 77; Resp 16; Pulse Ox 100% ;                                         bp  

23:21  / 63; Pulse 75; Resp 16; Pulse Ox 100% ;                                         bp  

20:36 Body Mass Index 25.85 (77.11 kg, 172.72 cm)                                             as6 

20:36 Pain Scale: Adult                                                                       as6 

                                                                                                  

MDM:                                                                                              

20:32 Patient medically screened.                                                               

21:15 Differential diagnosis: cholecystitis, Cholelithiasis, gastritis, pancreatitis, Peptic  cp  

      Ulcer Disease, Perf. Duodenal Ulcer, Perf. Gastric Ulcer, Ureterolithiasis, urinary         

      tract infection.                                                                            

23:02 Data reviewed: vital signs, nurses notes, lab test result(s), radiologic studies, CT    cp  

      scan.                                                                                       

23:02 I considered the following discharge prescriptions or medication management in the        

      emergency department Medications were administered in the Emergency Department. See         

      MAR. Special discussion: Based on the patient's Hx, exam, and Dx evaluation, there is       

      no indication for emergent surgery or inpatient Tx. It is understood by the                 

      patient/guardian that if the Sx's persist or worsen they need to return immediately for     

      re-evaluation.                                                                              

                                                                                                  

10/17                                                                                             

21: Order name: CBC with Diff; Complete Time: 22:46                                         cp  

10/17                                                                                             

22:47 Interpretation: Normal except: HGB 11.1; HCT 32.1; MCV 79.8.                            cp  

10/17                                                                                             

21: Order name: CMP; Complete Time: 22:46                                                   cp  

10/17                                                                                             

22:47 Interpretation: Normal except: .                                                  cp  

10/17                                                                                             

21: Order name: Lipase; Complete Time: 22:46                                                cp  

10/17                                                                                             

21: Order name: Pregnancy Test, Urine; Complete Time: 22:46                                 cp  

10/17                                                                                             

21: Order name: Urinalysis w/ reflexes; Complete Time: 22:46                                cp  

10/17                                                                                             

22:50 Interpretation: Normal except: [facility] Turbid; Urine SG > 1.030; UPROT TRACE; URBC 5-10;   cp  

      MUCUS 4+.                                                                                   

10/17                                                                                             

21: Order name: CT Abd/Pelvis - IV Contrast Only; Complete Time: 22:46                      cp  

10/17                                                                                             

21: Order name: IV Saline Lock; Complete Time: 21:02                                        cp  

10/17                                                                                             

21: Order name: Labs collected and sent; Complete Time: 21:02                               cp  

                                                                                                  

Administered Medications:                                                                         

21:11 Drug: Famotidine IVP 20 mg IVP once; dilute with 10 mL 0.9% NaCl; give over 2 minutes   la4 

      Route: IVP; Rate: bolus; Infused Over: 1 mins; Site: right antecubital;                     

23:24 Follow up: Response: No adverse reaction                                                bp  

21:12 Drug: NS 0.9% IV 1000 ml IV at 1 bolus Per protocol; 1000 mL bolus Route: IV; Rate: 1   la4 

      bolus; Infused Over: 30 mins; Site: right antecubital; Delivery: Primary tubing;            

23:24 Follow up: IV Status: Completed infusion; IV Intake: 1000ml                             bp  

21:12 Drug: Ondansetron IVP 4 mg IVP once; over 2 minutes Route: IVP; Rate: bolus; Infused    la4 

      Over: 1 mins; Site: right antecubital;                                                      

23:24 Follow up: Response: No adverse reaction                                                bp  

                                                                                                  

                                                                                                  

Disposition Summary:                                                                              

10/17/23 23:02                                                                                    

Discharge Ordered                                                                                 

 Notes:       Location: Home                                                                        
  cp

      Problem: new                                                                            cp  

      Symptoms: have improved                                                                 cp  

      Condition: Stable                                                                       cp  

      Diagnosis                                                                                   

        - Upper abdominal pain, unspecified - left                                            cp  

      Followup:                                                                               cp  

        - With: Private Physician                                                                  

        - When: 2 - 3 days                                                                         

        - Reason: Recheck today's complaints                                                       

      Discharge Instructions:                                                                     

        - Discharge Summary Sheet                                                             cp  

        - Abdominal Pain, Adult                                                               cp  

      Forms:                                                                                      

        - Medication Reconciliation Form                                                      cp  

        - Thank You Letter                                                                    cp  

        - Antibiotic Education                                                                cp  

        - Prescription Opioid Use                                                             cp  

        - Patient Portal Instructions                                                         cp  

        - Leadership Thank You Letter                                                         cp  

      Prescriptions:                                                                              

        - Pepcid 20 mg Oral Tablet                                                                 

            - take 1 tablet ORAL route every 12 hours for 10 days; 20 tablet; Refills: 0,     cp  

      Product Selection Permitted                                                                 

        - Zofran 4 mg Oral Tablet                                                                  

            - take 1 tablet ORAL route every 12 hours As needed; 20 tablet; Refills: 0,       cp  

      Product Selection Permitted                                                                 

Signatures:                                                                                       

Dispatcher [employer]                           EDMS                                                 

Vinicio Costello PA PA   cp                                                   

Mariano Farah RN                      RN   as6                                                  

Patsy Chapman RN                     RN   la4                                                  

Jb Nair RN   bp                                                   

                                                                                                  

**************************************************************************************************

## 2023-10-18 VITALS — DIASTOLIC BLOOD PRESSURE: 63 MMHG | SYSTOLIC BLOOD PRESSURE: 102 MMHG

## 2023-10-18 VITALS — OXYGEN SATURATION: 100 % | TEMPERATURE: 98.2 F
